# Patient Record
Sex: FEMALE | Race: WHITE | NOT HISPANIC OR LATINO | Employment: OTHER | URBAN - METROPOLITAN AREA
[De-identification: names, ages, dates, MRNs, and addresses within clinical notes are randomized per-mention and may not be internally consistent; named-entity substitution may affect disease eponyms.]

---

## 2017-01-26 ENCOUNTER — GENERIC CONVERSION - ENCOUNTER (OUTPATIENT)
Dept: OTHER | Facility: OTHER | Age: 77
End: 2017-01-26

## 2017-02-06 ENCOUNTER — GENERIC CONVERSION - ENCOUNTER (OUTPATIENT)
Dept: OTHER | Facility: OTHER | Age: 77
End: 2017-02-06

## 2017-02-24 ENCOUNTER — ALLSCRIPTS OFFICE VISIT (OUTPATIENT)
Dept: OTHER | Facility: OTHER | Age: 77
End: 2017-02-24

## 2017-03-10 ENCOUNTER — GENERIC CONVERSION - ENCOUNTER (OUTPATIENT)
Dept: OTHER | Facility: OTHER | Age: 77
End: 2017-03-10

## 2017-03-17 ENCOUNTER — GENERIC CONVERSION - ENCOUNTER (OUTPATIENT)
Dept: OTHER | Facility: OTHER | Age: 77
End: 2017-03-17

## 2017-04-07 ENCOUNTER — GENERIC CONVERSION - ENCOUNTER (OUTPATIENT)
Dept: OTHER | Facility: OTHER | Age: 77
End: 2017-04-07

## 2017-05-15 ENCOUNTER — GENERIC CONVERSION - ENCOUNTER (OUTPATIENT)
Dept: OTHER | Facility: OTHER | Age: 77
End: 2017-05-15

## 2017-06-30 ENCOUNTER — ALLSCRIPTS OFFICE VISIT (OUTPATIENT)
Dept: OTHER | Facility: OTHER | Age: 77
End: 2017-06-30

## 2017-09-12 ENCOUNTER — GENERIC CONVERSION - ENCOUNTER (OUTPATIENT)
Dept: OTHER | Facility: OTHER | Age: 77
End: 2017-09-12

## 2017-10-01 DIAGNOSIS — E11.9 TYPE 2 DIABETES MELLITUS WITHOUT COMPLICATIONS (HCC): ICD-10-CM

## 2017-10-01 DIAGNOSIS — Z00.00 ENCOUNTER FOR GENERAL ADULT MEDICAL EXAMINATION WITHOUT ABNORMAL FINDINGS: ICD-10-CM

## 2017-10-01 DIAGNOSIS — Z12.11 ENCOUNTER FOR SCREENING FOR MALIGNANT NEOPLASM OF COLON: ICD-10-CM

## 2017-10-01 DIAGNOSIS — I10 ESSENTIAL (PRIMARY) HYPERTENSION: ICD-10-CM

## 2017-10-01 DIAGNOSIS — E78.5 HYPERLIPIDEMIA: ICD-10-CM

## 2017-10-01 DIAGNOSIS — E55.9 VITAMIN D DEFICIENCY: ICD-10-CM

## 2017-10-31 ENCOUNTER — ALLSCRIPTS OFFICE VISIT (OUTPATIENT)
Dept: OTHER | Facility: OTHER | Age: 77
End: 2017-10-31

## 2017-11-01 NOTE — PROGRESS NOTES
Assessment  1  Hypertension (401 9) (I10)   2  Hyperlipemia (272 4) (E78 5)   3  Controlled diabetes mellitus (250 00) (E11 9)   4  Vitamin D deficiency (268 9) (E55 9)    Plan  Breast neoplasm, Health Maintenance, Hyperlipemia, Hypertension, Osteopenia,  Vitamin D deficiency    · (1) CBC/PLT/DIFF; Status:Active; Requested for:01Feb2018;    · (1) COMPREHENSIVE METABOLIC PANEL; Status:Active; Requested for:01Feb2018;    · (1) LIPID PANEL, FASTING; Status:Active; Requested for:01Feb2018; Controlled diabetes mellitus, Need for prophylactic vaccination and inoculation against  influenza    · Fluzone High-Dose 0 5 ML Intramuscular Suspension Prefilled Syringe  Health Maintenance, Hyperlipemia, Hypertension, Osteopenia, Vitamin D deficiency    · Continue with our present treatment plan ; Status:Complete;   Done: [id number]   · Follow-up visit in 4 Months Evaluation and Treatment  Follow-up  Status: Complete   Done: [id number]    Discussion/Summary    #1  Hypertension-stable at 130/66  Hyperlipidemia-patient has not gotten labs for this visit and will get them this week and be called with those results  Type 2 diabetes-controlled with last hemoglobin A1c 5 5%  Vitamin-D deficiency-stable  keep her blood pressure under control  4 months with labs  Patient will be called with labs that she was supposed to get for this visit  Possible side effects of new medications were reviewed with the patient/guardian today  The treatment plan was reviewed with the patient/guardian  The patient/guardian understands and agrees with the treatment plan     Self Referrals: No      Chief Complaint  Follow up hyperlipidemia,HTN, osteopenia, vitamin D deficiencyDose flu immunization administered today - [facility]      History of Present Illness  This is a 30-year-old female who comes in for her regular 4 months visit  She is feeling well with no complaints or problems   Her blood pressure today is 130/66 and her weight came down 4 lb from the previous visit to 171 lb  She feel to get her labs that were scheduled for this visit and will get them sometime this week and be called with those results  Review of Systems    Constitutional: No fever, no chills, feels well, no tiredness, no recent weight gain or weight loss  ENT: no complaints of earache, no loss of hearing, no nose bleeds, no nasal discharge, no sore throat, no hoarseness  Cardiovascular: No complaints of slow heart rate, no fast heart rate, no chest pain, no palpitations, no leg claudication, no lower extremity edema  Respiratory: No complaints of shortness of breath, no wheezing, no cough, no SOB on exertion, no orthopnea, no PND  Gastrointestinal: No complaints of abdominal pain, no constipation, no nausea or vomiting, no diarrhea, no bloody stools  Genitourinary: No complaints of dysuria, no incontinence, no pelvic pain, no dysmenorrhea, no vaginal discharge or bleeding  ROS reviewed  Active Problems  1  Allergic rhinitis (477 9) (J30 9)   2  Alopecia (704 00) (L65 9)   3  Breast neoplasm (239 3) (D49 3)   4  Bronchitis (490) (J40)   5  Colon cancer screening (V76 51) (Z12 11)   6  Controlled diabetes mellitus (250 00) (E11 9)   7  Hyperlipemia (272 4) (E78 5)   8  Hypertension (401 9) (I10)   9  Need for prophylactic vaccination and inoculation against influenza (V04 81) (Z23)   10  Osteopenia (733 90) (M85 80)   11  Screening for genitourinary condition (V81 6) (Z13 89)   12  Skin lesion (709 9) (L98 9)   13  Soft 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 2) (D49 2)   14  Synovial Cyst (727 40)   15  Urinary frequency (788 41) (R35 0)   16  Vitamin D deficiency (268 9) (E55 9)    Past Medical History  1  History of hemorrhoids (V13 89) (Z87 19)    The active problems and past medical history were reviewed and updated today  Surgical History  1  History of Bx Breast Percutan Needle Core Use Imag Guide (Stereotactic)   2  History of Hemorrhoidectomy   3   History of Total Abdominal Hysterectomy    The surgical history was reviewed and updated today  Family History  Mother    1  Family history of Family Health Status Of Mother -    2  Family history of Heart Disease (V17 49)   3  Family history of Lung Cancer (V16 1)   4  Family history of Uterine Cancer (V16 49)  Father    5  Family history of Family Health Status Of Father -    10  Family history of Heart Disease (V17 49)  Sister    7  Family history of malignant neoplasm of breast (V16 3) (Z80 3)  Maternal Grandmother    8  Family history of diabetes mellitus (V18 0) (Z83 3)    The family history was reviewed and updated today  Social History   · Being A Social Drinker   ·    · Never a smoker   · Retired  The social history was reviewed and updated today  The social history was reviewed and is unchanged  Current Meds   1  Biotin 87459 MCG Oral Tablet; Therapy: 33MWD4015 to Recorded   2  Clobetasol Propionate 0 05 % External Ointment; Therapy: 2015 to (Evaluate:73Axa8571) Recorded   3  HydroCHLOROthiazide 50 MG Oral Tablet; TAKE 1 TABLET DAILY; Therapy: 93DJW2252 to (Evaluate:2018)  Requested for: 38FFK1247; Last   Rx:2017 Ordered   4  Lumigan 0 01 % Ophthalmic Solution Recorded   5  MetFORMIN HCl - 500 MG Oral Tablet; TAKE 1 TABLET BY MOUTH EVERY DAY AS   DIRECTED; Therapy: 13ZHQ8711 to (Evaluate:73Aab0616)  Requested for: 43ZFT0212; Last   Rx:29Equ2208 Ordered   6  Vitamin D 2000 UNIT Oral Capsule; Take one capsule twice daily; Therapy: 16TZF1312 to Recorded    Allergies  1   Contrast Media Ready-Box MISC    Vitals  Vital Signs    Recorded: 23GIK5014 07:43AM   Heart Rate 80, L Radial   Pulse Quality Regular, L Radial   Systolic 055, LUE, Sitting   Diastolic 66, LUE, Sitting   BP CUFF SIZE Large   Height 5 ft 3 in   Weight 171 lb    BMI Calculated 30 29   BSA Calculated 1 81     Physical Exam    Constitutional   General appearance: No acute distress, well appearing and well nourished  Pulmonary   Auscultation of lungs: Clear to auscultation  Cardiovascular   Auscultation of heart: Normal rate and rhythm, normal S1 and S2, without murmurs  Examination of extremities for edema and/or varicosities: Normal     Psychiatric   Orientation to person, place, and time: Normal     Mood and affect: Normal          Health Management  Controlled diabetes mellitus   *VB - Eye Exam; every 1 year; Last 26Aug2016; Next Due: 75Igp5454; Overdue  Health Maintenance   *VB - Eye Exam; every 1 year; Last 26Aug2016; Next Due: 98Pmx3862;  Overdue    Future Appointments    Date/Time Provider Specialty Site   03/02/2018 07:30 AM Madisyn Burton, [facility]     Signatures   Electronically signed by : Hollis Stevens, [facility]; Oct 31 2017  8:22AM EST                       (Author)    Electronically signed by : Troy Chester MD; Oct 31 2017  9:34AM EST                       (Author)

## 2018-01-13 VITALS
WEIGHT: 172.63 LBS | DIASTOLIC BLOOD PRESSURE: 70 MMHG | HEIGHT: 63 IN | HEART RATE: 80 BPM | SYSTOLIC BLOOD PRESSURE: 130 MMHG | BODY MASS INDEX: 30.59 KG/M2

## 2018-01-14 VITALS
SYSTOLIC BLOOD PRESSURE: 154 MMHG | BODY MASS INDEX: 31.01 KG/M2 | HEART RATE: 72 BPM | WEIGHT: 175 LBS | HEIGHT: 63 IN | DIASTOLIC BLOOD PRESSURE: 74 MMHG

## 2018-01-14 VITALS
DIASTOLIC BLOOD PRESSURE: 66 MMHG | WEIGHT: 171 LBS | HEIGHT: 63 IN | HEART RATE: 80 BPM | BODY MASS INDEX: 30.3 KG/M2 | SYSTOLIC BLOOD PRESSURE: 130 MMHG

## 2018-02-01 DIAGNOSIS — D49.3 NEOPLASM OF BREAST: ICD-10-CM

## 2018-02-01 DIAGNOSIS — I10 ESSENTIAL (PRIMARY) HYPERTENSION: ICD-10-CM

## 2018-02-01 DIAGNOSIS — M85.80 OTHER SPECIFIED DISORDERS OF BONE DENSITY AND STRUCTURE, UNSPECIFIED SITE (CODE): ICD-10-CM

## 2018-02-01 DIAGNOSIS — Z00.00 ENCOUNTER FOR GENERAL ADULT MEDICAL EXAMINATION WITHOUT ABNORMAL FINDINGS: ICD-10-CM

## 2018-02-01 DIAGNOSIS — E55.9 VITAMIN D DEFICIENCY: ICD-10-CM

## 2018-02-01 DIAGNOSIS — E78.5 HYPERLIPIDEMIA: ICD-10-CM

## 2018-03-02 ENCOUNTER — OFFICE VISIT (OUTPATIENT)
Dept: FAMILY MEDICINE CLINIC | Facility: CLINIC | Age: 78
End: 2018-03-02
Payer: COMMERCIAL

## 2018-03-02 VITALS
HEIGHT: 63 IN | HEART RATE: 80 BPM | BODY MASS INDEX: 30.65 KG/M2 | DIASTOLIC BLOOD PRESSURE: 60 MMHG | WEIGHT: 173 LBS | SYSTOLIC BLOOD PRESSURE: 120 MMHG

## 2018-03-02 DIAGNOSIS — E78.2 MIXED HYPERLIPIDEMIA: ICD-10-CM

## 2018-03-02 DIAGNOSIS — E55.9 VITAMIN D DEFICIENCY: ICD-10-CM

## 2018-03-02 DIAGNOSIS — I10 ESSENTIAL HYPERTENSION: ICD-10-CM

## 2018-03-02 DIAGNOSIS — E11.9 CONTROLLED TYPE 2 DIABETES MELLITUS WITHOUT COMPLICATION, WITHOUT LONG-TERM CURRENT USE OF INSULIN (HCC): Primary | ICD-10-CM

## 2018-03-02 PROCEDURE — 1101F PT FALLS ASSESS-DOCD LE1/YR: CPT | Performed by: FAMILY MEDICINE

## 2018-03-02 PROCEDURE — 3725F SCREEN DEPRESSION PERFORMED: CPT | Performed by: FAMILY MEDICINE

## 2018-03-02 PROCEDURE — 1160F RVW MEDS BY RX/DR IN RCRD: CPT | Performed by: FAMILY MEDICINE

## 2018-03-02 PROCEDURE — 99214 OFFICE O/P EST MOD 30 MIN: CPT | Performed by: FAMILY MEDICINE

## 2018-03-02 RX ORDER — HYDROCHLOROTHIAZIDE 50 MG/1
1 TABLET ORAL DAILY
COMMUNITY
Start: 2014-11-20 | End: 2018-08-27 | Stop reason: SDUPTHER

## 2018-03-02 RX ORDER — MULTIVIT-MIN/IRON/FOLIC ACID/K 18-600-40
1 CAPSULE ORAL 2 TIMES DAILY
COMMUNITY
Start: 2015-06-05

## 2018-03-02 RX ORDER — GLUCOSAMINE/D3/BOSWELLIA SERRA 1500MG-400
TABLET ORAL
COMMUNITY
Start: 2015-06-05 | End: 2018-09-21 | Stop reason: ALTCHOICE

## 2018-03-02 RX ORDER — CLOBETASOL PROPIONATE 0.5 MG/G
OINTMENT TOPICAL
COMMUNITY
Start: 2015-04-29 | End: 2020-11-25 | Stop reason: SDUPTHER

## 2018-03-02 NOTE — PROGRESS NOTES
Assessment/Plan:    Controlled diabetes mellitus ([facility] [address] )  Patient's blood sugar is well controlled at 101 up from 90  She is currently on metformin 500 mg 1 daily  Hypertension  Her blood pressure is stable at 120/60  She takes hydrochlorothiazide 50 mg daily  Her potassium is within normal range  Hyperlipemia  Cholesterol numbers are slightly elevated with the total cholesterol 203 and the triglycerides 199  She did bring her LDL down from 132 to 113 and she is not on any medication to lower the cholesterol  Vitamin D deficiency  Vitamin-D level was ordered for her next visit  Diagnoses and all orders for this visit:    Controlled type 2 diabetes mellitus without complication, without long-term current use of insulin (HCC)  -     CBC and differential  -     Comprehensive metabolic panel  -     HEMOGLOBIN A1C W/ EAG ESTIMATION; Future  -     Lipid Panel with Direct LDL reflex; Future  -     Microalbumin / creatinine urine ratio; Future  -     TSH, 3rd generation; Future  -     Vitamin B12; Future    Essential hypertension  -     CBC and differential  -     Comprehensive metabolic panel  -     HEMOGLOBIN A1C W/ EAG ESTIMATION; Future  -     Lipid Panel with Direct LDL reflex; Future  -     Microalbumin / creatinine urine ratio; Future  -     TSH, 3rd generation; Future  -     Vitamin B12; Future    Mixed hyperlipidemia  -     CBC and differential  -     Comprehensive metabolic panel  -     HEMOGLOBIN A1C W/ EAG ESTIMATION; Future  -     Lipid Panel with Direct LDL reflex; Future  -     Microalbumin / creatinine urine ratio; Future  -     TSH, 3rd generation; Future  -     Vitamin B12; Future    Vitamin D deficiency  -     CBC and differential  -     Comprehensive metabolic panel  -     HEMOGLOBIN A1C W/ EAG ESTIMATION; Future  -     Lipid Panel with Direct LDL reflex; Future  -     Microalbumin / creatinine urine ratio; Future  -     TSH, 3rd generation;  Future  -     Vitamin B12; Future  - Vitamin D 25 hydroxy; Future    Other orders  -     Biotin 60173 MCG TABS; Take by mouth  -     clobetasol (TEMOVATE) 0 05 % ointment; Apply topically  -     hydrochlorothiazide (HYDRODIURIL) 50 mg tablet; Take 1 tablet by mouth daily  -     bimatoprost (LUMIGAN) 0 01 % ophthalmic drops; Apply to eye  -     metFORMIN (GLUCOPHAGE) 500 mg tablet; Take 1 tablet by mouth daily  -     Cholecalciferol (VITAMIN D) 2000 units CAPS; Take 1 capsule by mouth 2 (two) times a day          Subjective:      Patient ID: Joslyn Wiggins is a 69 y o  female  CC:  Follow up DM 2, hyperlipidemia, HTN, Vitamin D deficiency  Review BW results that were done at [facility]    HPI:  This is a 60-year-old female who comes in accompanied by her  for her regular 4 months visit  She is feeling well with no complaints or problems  Her blood pressure is 120/60 and her weight is down 1 lb from the previous visit to 173 lb  Reviewing her labs her CBC is normal, glucose is 101 up from 90, total cholesterol 203, triglycerides 199, HDL 50 and LDL came  Down from 132 to 113  Because of being on metformin she would like of vitamin B12 level drawn on her in her labs next time  The following portions of the patient's history were reviewed and updated as appropriate: allergies, current medications, past family history, past medical history, past social history, past surgical history and problem list     Review of Systems   Constitutional: Negative  HENT: Negative  Eyes: Negative  Respiratory: Negative  Cardiovascular: Negative  Gastrointestinal: Negative  Endocrine: Negative  Genitourinary: Negative  Musculoskeletal: Negative  Skin: Negative  Allergic/Immunologic: Negative  Neurological: Negative  Hematological: Negative  Psychiatric/Behavioral: Negative            Objective:      /60 (BP Location: Left arm, Patient Position: Sitting, Cuff Size: Large)   Pulse 80   Ht 5' 3" (1 6 m)   Wt 78 5 kg (173 lb)   BMI 30 65 kg/m²          Physical Exam   Constitutional: She is oriented to person, place, and time  She appears well-developed and well-nourished  HENT:   Head: Normocephalic  Right Ear: External ear normal    Left Ear: External ear normal    Mouth/Throat: Oropharynx is clear and moist    Eyes: Conjunctivae and EOM are normal  Pupils are equal, round, and reactive to light  Neck: Normal range of motion  Neck supple  Cardiovascular: Normal rate, regular rhythm and normal heart sounds  Pulmonary/Chest: Effort normal and breath sounds normal    Abdominal: Soft  Bowel sounds are normal    Musculoskeletal: Normal range of motion  Neurological: She is alert and oriented to person, place, and time  Skin: Skin is warm  Psychiatric: She has a normal mood and affect  Her behavior is normal  Judgment and thought content normal    Nursing note and vitals reviewed

## 2018-03-02 NOTE — ASSESSMENT & PLAN NOTE
Patient's blood sugar is well controlled at 101 up from 90  She is currently on metformin 500 mg 1 daily

## 2018-03-02 NOTE — ASSESSMENT & PLAN NOTE
Cholesterol numbers are slightly elevated with the total cholesterol 203 and the triglycerides 199  She did bring her LDL down from 132 to 113 and she is not on any medication to lower the cholesterol

## 2018-03-02 NOTE — ASSESSMENT & PLAN NOTE
Her blood pressure is stable at 120/60  She takes hydrochlorothiazide 50 mg daily  Her potassium is within normal range

## 2018-05-18 ENCOUNTER — LAB REQUISITION (OUTPATIENT)
Dept: LAB | Facility: HOSPITAL | Age: 78
End: 2018-05-18
Payer: COMMERCIAL

## 2018-05-18 DIAGNOSIS — Z11.51 ENCOUNTER FOR SCREENING FOR HUMAN PAPILLOMAVIRUS (HPV): ICD-10-CM

## 2018-05-18 DIAGNOSIS — Z12.72 ENCOUNTER FOR SCREENING FOR MALIGNANT NEOPLASM OF VAGINA: ICD-10-CM

## 2018-05-18 DIAGNOSIS — Z01.419 ENCOUNTER FOR GYNECOLOGICAL EXAMINATION WITHOUT ABNORMAL FINDING: ICD-10-CM

## 2018-05-18 PROCEDURE — 87624 HPV HI-RISK TYP POOLED RSLT: CPT | Performed by: OBSTETRICS & GYNECOLOGY

## 2018-05-18 PROCEDURE — 88175 CYTOPATH C/V AUTO FLUID REDO: CPT | Performed by: OBSTETRICS & GYNECOLOGY

## 2018-05-22 LAB — HPV RRNA GENITAL QL NAA+PROBE: NORMAL

## 2018-05-23 LAB
LAB AP GYN PRIMARY INTERPRETATION: NORMAL
Lab: NORMAL

## 2018-07-22 DIAGNOSIS — E11.9 TYPE 2 DIABETES MELLITUS WITHOUT COMPLICATION, WITHOUT LONG-TERM CURRENT USE OF INSULIN (HCC): Primary | ICD-10-CM

## 2018-08-27 DIAGNOSIS — I10 ESSENTIAL HYPERTENSION: Primary | ICD-10-CM

## 2018-08-27 RX ORDER — HYDROCHLOROTHIAZIDE 50 MG/1
TABLET ORAL
Qty: 90 TABLET | Refills: 1 | Status: SHIPPED | OUTPATIENT
Start: 2018-08-27 | End: 2018-11-02 | Stop reason: SDUPTHER

## 2018-09-04 LAB
CREAT ?TM UR-SCNC: 37.6 UMOL/L
HBA1C MFR BLD HPLC: 5.6 %
MICROALBUMIN UR-MCNC: 0.9 MG/L (ref 0–20)
MICROALBUMIN/CREAT UR: 0 MG/G{CREAT}

## 2018-09-21 ENCOUNTER — OFFICE VISIT (OUTPATIENT)
Dept: FAMILY MEDICINE CLINIC | Facility: CLINIC | Age: 78
End: 2018-09-21
Payer: COMMERCIAL

## 2018-09-21 VITALS
WEIGHT: 173 LBS | HEART RATE: 70 BPM | DIASTOLIC BLOOD PRESSURE: 60 MMHG | BODY MASS INDEX: 30.65 KG/M2 | SYSTOLIC BLOOD PRESSURE: 124 MMHG | HEIGHT: 63 IN

## 2018-09-21 DIAGNOSIS — E55.9 VITAMIN D DEFICIENCY: ICD-10-CM

## 2018-09-21 DIAGNOSIS — I10 ESSENTIAL HYPERTENSION: ICD-10-CM

## 2018-09-21 DIAGNOSIS — E11.9 CONTROLLED TYPE 2 DIABETES MELLITUS WITHOUT COMPLICATION, WITHOUT LONG-TERM CURRENT USE OF INSULIN (HCC): ICD-10-CM

## 2018-09-21 DIAGNOSIS — E78.2 MIXED HYPERLIPIDEMIA: ICD-10-CM

## 2018-09-21 DIAGNOSIS — R07.89 CHEST TIGHTNESS OR PRESSURE: Primary | ICD-10-CM

## 2018-09-21 DIAGNOSIS — Z23 NEED FOR INFLUENZA VACCINATION: ICD-10-CM

## 2018-09-21 PROCEDURE — G0008 ADMIN INFLUENZA VIRUS VAC: HCPCS

## 2018-09-21 PROCEDURE — 93000 ELECTROCARDIOGRAM COMPLETE: CPT | Performed by: FAMILY MEDICINE

## 2018-09-21 PROCEDURE — 99214 OFFICE O/P EST MOD 30 MIN: CPT | Performed by: FAMILY MEDICINE

## 2018-09-21 PROCEDURE — 3008F BODY MASS INDEX DOCD: CPT | Performed by: FAMILY MEDICINE

## 2018-09-21 PROCEDURE — 3074F SYST BP LT 130 MM HG: CPT | Performed by: FAMILY MEDICINE

## 2018-09-21 PROCEDURE — 90662 IIV NO PRSV INCREASED AG IM: CPT

## 2018-09-21 PROCEDURE — 3078F DIAST BP <80 MM HG: CPT | Performed by: FAMILY MEDICINE

## 2018-09-21 NOTE — PROGRESS NOTES
Assessment/Plan:    Controlled diabetes mellitus ([facility] [address] )  Lab Results   Component Value Date    HGBA1C 5 6 09/04/2018       No results for input(s): POCGLU in the last 72 hours  Blood Sugar Average: Last 72 hrs:    Her glucose went up from 101 to 106 and her hemoglobin A1c was 5 6  She is on metformin 500 mg 1 daily      Hyperlipemia  Her cholesterol numbers are stable but her LDL is slightly elevated at 113  She is not on any cholesterol-lowering medication at this time  A repeat LDL will be done again in 6 months  Hypertension  Her blood pressure is stable at 120 4/60  She is on hydrochlorothiazide 50 mg daily  Diagnoses and all orders for this visit:    Chest tightness or pressure  -     Echo complete with contrast if indicated; Future  -     Comprehensive metabolic panel; Future  -     LDL cholesterol, direct; Future    Controlled type 2 diabetes mellitus without complication, without long-term current use of insulin (HCC)  -     Comprehensive metabolic panel; Future  -     LDL cholesterol, direct; Future    Mixed hyperlipidemia  -     Comprehensive metabolic panel; Future  -     LDL cholesterol, direct; Future    Essential hypertension  -     Comprehensive metabolic panel; Future  -     LDL cholesterol, direct; Future    Vitamin D deficiency  -     Comprehensive metabolic panel; Future  -     LDL cholesterol, direct; Future          Subjective: Follow up hyperlipidemia, HTN, osteopenia, vitamin D deficiency, DM2  Review BW results  High Dose flu immunization administered today  Pt had diabetic eye exam 10 Sept 2018   -  [facility]     Patient ID: Gael Aden is a 69 y o  female  This is a 67-year-old female who comes in accompanied by her  for her regular 6 month visit  She has had 2 episodes of pressure on her chest while she was doing the dishes and had no actual chest pain or shortness of breath  She had no radiation of the chest pain down the arm or to the jaw    The episode lasted for several seconds and was fleeting  Her blood pressure today is 124/60 and her weight remained the same at 173 lb  Her EKG which was done in the office today was read by Dr Saulo Hoyt as no acute changes  She will be set up for a stress echo in the near future  Reviewing her labs, her CBC was normal, glucose went up from 101 to 106, B12 level was normal vitamin-D level was 46 and her TSH was within normal limits  Her cholesterol numbers were stable and the LDL remained the same at 113  The following portions of the patient's history were reviewed and updated as appropriate: allergies, current medications, past family history, past medical history, past social history, past surgical history and problem list     Review of Systems   Constitutional: Negative  HENT: Negative  Eyes: Negative  Respiratory: Negative  Cardiovascular: Negative  Gastrointestinal: Negative  Endocrine: Negative  Genitourinary: Negative  Musculoskeletal: Negative  Skin: Negative  Allergic/Immunologic: Negative  Neurological: Negative  Hematological: Negative  Psychiatric/Behavioral: Negative  Objective:      /60 (BP Location: Left arm, Patient Position: Sitting, Cuff Size: Large)   Pulse 70   Ht 5' 3" (1 6 m)   Wt 78 5 kg (173 lb)   BMI 30 65 kg/m²          Physical Exam   Constitutional: She is oriented to person, place, and time  She appears well-developed and well-nourished  HENT:   Head: Normocephalic  Right Ear: External ear normal    Left Ear: External ear normal    Mouth/Throat: Oropharynx is clear and moist    Eyes: Conjunctivae and EOM are normal  Pupils are equal, round, and reactive to light  Neck: Normal range of motion  Neck supple  Cardiovascular: Normal rate, regular rhythm and normal heart sounds  Pulmonary/Chest: Effort normal and breath sounds normal    Abdominal: Soft  Bowel sounds are normal    Musculoskeletal: Normal range of motion  Neurological: She is alert and oriented to person, place, and time  Skin: Skin is warm  Psychiatric: She has a normal mood and affect  Her behavior is normal  Judgment and thought content normal    Nursing note and vitals reviewed

## 2018-09-21 NOTE — ASSESSMENT & PLAN NOTE
Her cholesterol numbers are stable but her LDL is slightly elevated at 113  She is not on any cholesterol-lowering medication at this time  A repeat LDL will be done again in 6 months

## 2018-09-21 NOTE — ASSESSMENT & PLAN NOTE
Lab Results   Component Value Date    HGBA1C 5 6 09/04/2018       No results for input(s): POCGLU in the last 72 hours  Blood Sugar Average: Last 72 hrs:    Her glucose went up from 101 to 106 and her hemoglobin A1c was 5 6    She is on metformin 500 mg 1 daily

## 2018-09-24 LAB
LEFT EYE DIABETIC RETINOPATHY: NORMAL
RIGHT EYE DIABETIC RETINOPATHY: NORMAL

## 2018-10-16 PROBLEM — R53.82 CHRONIC FATIGUE: Status: ACTIVE | Noted: 2018-10-16

## 2018-10-30 ENCOUNTER — HOSPITAL ENCOUNTER (OUTPATIENT)
Dept: NON INVASIVE DIAGNOSTICS | Facility: CLINIC | Age: 78
Discharge: HOME | End: 2018-10-30
Payer: COMMERCIAL

## 2018-10-30 DIAGNOSIS — R07.89 CHEST TIGHTNESS OR PRESSURE: ICD-10-CM

## 2018-10-30 PROCEDURE — 93306 TTE W/DOPPLER COMPLETE: CPT

## 2018-10-30 PROCEDURE — 93306 TTE W/DOPPLER COMPLETE: CPT | Performed by: INTERNAL MEDICINE

## 2018-11-02 ENCOUNTER — OFFICE VISIT (OUTPATIENT)
Dept: FAMILY MEDICINE CLINIC | Facility: CLINIC | Age: 78
End: 2018-11-02
Payer: COMMERCIAL

## 2018-11-02 VITALS
BODY MASS INDEX: 31.54 KG/M2 | RESPIRATION RATE: 16 BRPM | HEART RATE: 80 BPM | WEIGHT: 178 LBS | SYSTOLIC BLOOD PRESSURE: 120 MMHG | TEMPERATURE: 98 F | HEIGHT: 63 IN | DIASTOLIC BLOOD PRESSURE: 64 MMHG

## 2018-11-02 DIAGNOSIS — Z01.818 PREOP TESTING: Primary | ICD-10-CM

## 2018-11-02 DIAGNOSIS — I10 ESSENTIAL HYPERTENSION: ICD-10-CM

## 2018-11-02 DIAGNOSIS — Z01.818 PREOP GENERAL PHYSICAL EXAM: ICD-10-CM

## 2018-11-02 PROCEDURE — 1036F TOBACCO NON-USER: CPT | Performed by: FAMILY MEDICINE

## 2018-11-02 PROCEDURE — 3008F BODY MASS INDEX DOCD: CPT | Performed by: FAMILY MEDICINE

## 2018-11-02 PROCEDURE — 4040F PNEUMOC VAC/ADMIN/RCVD: CPT | Performed by: FAMILY MEDICINE

## 2018-11-02 PROCEDURE — 3074F SYST BP LT 130 MM HG: CPT | Performed by: FAMILY MEDICINE

## 2018-11-02 PROCEDURE — 99214 OFFICE O/P EST MOD 30 MIN: CPT | Performed by: FAMILY MEDICINE

## 2018-11-02 PROCEDURE — 3078F DIAST BP <80 MM HG: CPT | Performed by: FAMILY MEDICINE

## 2018-11-02 RX ORDER — PREDNISOLONE ACETATE 10 MG/ML
SUSPENSION/ DROPS OPHTHALMIC
Refills: 1 | COMMUNITY
Start: 2018-10-18 | End: 2019-04-05 | Stop reason: ALTCHOICE

## 2018-11-02 RX ORDER — HYDROCHLOROTHIAZIDE 50 MG/1
50 TABLET ORAL DAILY
Qty: 90 TABLET | Refills: 3 | Status: SHIPPED | OUTPATIENT
Start: 2018-11-02 | End: 2019-11-10 | Stop reason: SDUPTHER

## 2018-11-02 RX ORDER — KETOROLAC TROMETHAMINE 5 MG/ML
SOLUTION OPHTHALMIC
Refills: 1 | COMMUNITY
Start: 2018-10-18 | End: 2019-04-05 | Stop reason: ALTCHOICE

## 2018-11-02 RX ORDER — POLYMYXIN B SULFATE AND TRIMETHOPRIM 1; 10000 MG/ML; [USP'U]/ML
SOLUTION OPHTHALMIC
Refills: 1 | COMMUNITY
Start: 2018-10-18 | End: 2019-04-05 | Stop reason: ALTCHOICE

## 2018-11-02 RX ORDER — TRAVOPROST 0.004 %
DROPS OPHTHALMIC (EYE)
COMMUNITY
Start: 2018-10-22 | End: 2019-04-05 | Stop reason: ALTCHOICE

## 2018-11-02 NOTE — PROGRESS NOTES
Assessment/Plan:    Hypertension  Her blood pressure is stable at 1 20/64 and she is on hydrochlorothiazide 50 mg 1 daily  Preop general physical exam  Her physical exam is within normal limits  Preop testing  Dr Nevaeh fry ordered a BMP and I ordered a CBC, PT and PTT which will be done today  Addendum:  CBC, PT, PTT was reviewed and was within normal limits  Therefore the patient is cleared for surgery  Diagnoses and all orders for this visit:    Preop testing  -     hydrochlorothiazide (HYDRODIURIL) 50 mg tablet; Take 1 tablet (50 mg total) by mouth daily  -     APTT; Future  -     CBC and differential; Future  -     Protime-INR; Future    Essential hypertension  -     hydrochlorothiazide (HYDRODIURIL) 50 mg tablet; Take 1 tablet (50 mg total) by mouth daily    Preop general physical exam  -     hydrochlorothiazide (HYDRODIURIL) 50 mg tablet; Take 1 tablet (50 mg total) by mouth daily  -     APTT; Future  -     CBC and differential; Future  -     Protime-INR; Future    Other orders  -     ketorolac (ACULAR) 0 5 % ophthalmic solution; INSTILL 1 DROP INTO RIGHT EYE 4X DAY X 1WK, 3X X 1 WK, 2X DAY X 1WK, 1X DAY X 1WK  -     polymyxin b-trimethoprim (POLYTRIM) ophthalmic solution; INSTILL 1 DROP INTO SURGERY EYE 4 TIMES A DAY, START 3 DAYS PRIOR TO SURGERY/SEPARATE BY 5 MIN OF EA  -     prednisoLONE acetate (PRED FORTE) 1 % ophthalmic suspension; INSTILL 1 DROP INTO SURGERY EYE 4 TIMES A DAY, START 3 DAYS PRIOR TO SURGERY, SEPARATE DROPS BY 5MIN  -     TRAVATAN Z 0 004 % ophthalmic solution;           Subjective:   Pre op for cataract surgery left eye 12 Nov and right eye 26 Nov   Dr Nevaeh Carty  Pt had echocardiogram and will be getting BW today  She has questions about starting a new medication - Rogaine for women  -  [facility]     Patient ID: Lelo Ramirez is a 69 y o  female  This is a 56-year-old female who comes in accompanied by her  for a preop physical exam to remove her cataracts    Her left cataract is going to be removed on November 12th and her right cataract is going to be removed on November 26th  The surgery will be done by Dr Dwight Alves  Her blood pressure is stable at 1 20/64 and her weight is 178 lb  The cataracts appeared approximately 1-1 and half years ago and have gotten progressively worse especially noticed with trying to read distance objects  She had an echocardiogram which was basically within normal range  The tightness she was having in her chest has totally disappeared and only lasted for a day or 2  The following portions of the patient's history were reviewed and updated as appropriate: allergies, current medications, past family history, past medical history, past social history, past surgical history and problem list     Review of Systems   Constitutional: Negative  HENT: Negative  Eyes: Positive for visual disturbance  Bilateral cataracts   Respiratory: Negative  Cardiovascular: Negative  Gastrointestinal: Negative  Endocrine: Negative  Genitourinary: Negative  Musculoskeletal: Negative  Skin: Negative  Allergic/Immunologic: Negative  Neurological: Negative  Hematological: Negative  Psychiatric/Behavioral: Negative  Objective:      /64 (BP Location: Left arm, Patient Position: Sitting, Cuff Size: Large)   Pulse 80   Temp 98 °F (36 7 °C) (Oral)   Resp 16   Ht 5' 3" (1 6 m)   Wt 80 7 kg (178 lb)   BMI 31 53 kg/m²          Physical Exam   Constitutional: She is oriented to person, place, and time  She appears well-developed and well-nourished  HENT:   Head: Normocephalic  Right Ear: External ear normal    Left Ear: External ear normal    Mouth/Throat: Oropharynx is clear and moist    Eyes: Pupils are equal, round, and reactive to light  Conjunctivae and EOM are normal    Neck: Normal range of motion  Neck supple  Cardiovascular: Normal rate, regular rhythm and normal heart sounds  Pulmonary/Chest: Effort normal and breath sounds normal    Abdominal: Soft  Bowel sounds are normal    Musculoskeletal: Normal range of motion  Neurological: She is alert and oriented to person, place, and time  Skin: Skin is warm  Psychiatric: She has a normal mood and affect  Her behavior is normal  Judgment and thought content normal    Nursing note and vitals reviewed

## 2018-11-02 NOTE — ASSESSMENT & PLAN NOTE
Dr Ashish fry ordered a BMP and I ordered a CBC, PT and PTT which will be done today  Addendum:  CBC, PT, PTT was reviewed and was within normal limits  Therefore the patient is cleared for surgery

## 2019-03-21 ENCOUNTER — LAB (OUTPATIENT)
Dept: LAB | Facility: CLINIC | Age: 79
End: 2019-03-21
Payer: COMMERCIAL

## 2019-03-21 DIAGNOSIS — R07.89 CHEST TIGHTNESS OR PRESSURE: ICD-10-CM

## 2019-03-21 DIAGNOSIS — E55.9 VITAMIN D DEFICIENCY: ICD-10-CM

## 2019-03-21 DIAGNOSIS — E11.9 CONTROLLED TYPE 2 DIABETES MELLITUS WITHOUT COMPLICATION, WITHOUT LONG-TERM CURRENT USE OF INSULIN (HCC): ICD-10-CM

## 2019-03-21 DIAGNOSIS — Z01.818 PREOP TESTING: ICD-10-CM

## 2019-03-21 DIAGNOSIS — I10 ESSENTIAL HYPERTENSION: ICD-10-CM

## 2019-03-21 DIAGNOSIS — Z01.818 PREOP GENERAL PHYSICAL EXAM: ICD-10-CM

## 2019-03-21 DIAGNOSIS — E78.2 MIXED HYPERLIPIDEMIA: ICD-10-CM

## 2019-03-21 LAB
ALBUMIN SERPL BCP-MCNC: 4 G/DL (ref 3.5–5)
ALP SERPL-CCNC: 45 U/L (ref 46–116)
ALT SERPL W P-5'-P-CCNC: 21 U/L (ref 12–78)
ANION GAP SERPL CALCULATED.3IONS-SCNC: 6 MMOL/L (ref 4–13)
AST SERPL W P-5'-P-CCNC: 12 U/L (ref 5–45)
BILIRUB SERPL-MCNC: 0.71 MG/DL (ref 0.2–1)
BUN SERPL-MCNC: 13 MG/DL (ref 5–25)
CALCIUM SERPL-MCNC: 9.5 MG/DL (ref 8.3–10.1)
CHLORIDE SERPL-SCNC: 97 MMOL/L (ref 100–108)
CO2 SERPL-SCNC: 31 MMOL/L (ref 21–32)
CREAT SERPL-MCNC: 0.74 MG/DL (ref 0.6–1.3)
GFR SERPL CREATININE-BSD FRML MDRD: 78 ML/MIN/1.73SQ M
GLUCOSE P FAST SERPL-MCNC: 108 MG/DL (ref 65–99)
LDLC SERPL DIRECT ASSAY-MCNC: 113 MG/DL (ref 0–100)
POTASSIUM SERPL-SCNC: 2.9 MMOL/L (ref 3.5–5.3)
PROT SERPL-MCNC: 7.2 G/DL (ref 6.4–8.2)
SODIUM SERPL-SCNC: 134 MMOL/L (ref 136–145)

## 2019-03-21 PROCEDURE — 83721 ASSAY OF BLOOD LIPOPROTEIN: CPT

## 2019-03-21 PROCEDURE — 36415 COLL VENOUS BLD VENIPUNCTURE: CPT

## 2019-03-21 PROCEDURE — 80053 COMPREHEN METABOLIC PANEL: CPT

## 2019-04-05 ENCOUNTER — OFFICE VISIT (OUTPATIENT)
Dept: FAMILY MEDICINE CLINIC | Facility: CLINIC | Age: 79
End: 2019-04-05
Payer: COMMERCIAL

## 2019-04-05 VITALS
WEIGHT: 172 LBS | DIASTOLIC BLOOD PRESSURE: 70 MMHG | SYSTOLIC BLOOD PRESSURE: 140 MMHG | HEART RATE: 84 BPM | HEIGHT: 63 IN | BODY MASS INDEX: 30.48 KG/M2

## 2019-04-05 DIAGNOSIS — R53.82 CHRONIC FATIGUE: ICD-10-CM

## 2019-04-05 DIAGNOSIS — Z23 NEED FOR VACCINATION AGAINST STREPTOCOCCUS PNEUMONIAE USING PNEUMOCOCCAL CONJUGATE VACCINE 13: ICD-10-CM

## 2019-04-05 DIAGNOSIS — E55.9 VITAMIN D DEFICIENCY: ICD-10-CM

## 2019-04-05 DIAGNOSIS — E78.2 MIXED HYPERLIPIDEMIA: Primary | ICD-10-CM

## 2019-04-05 DIAGNOSIS — E66.9 OBESITY (BMI 30.0-34.9): ICD-10-CM

## 2019-04-05 DIAGNOSIS — I10 ESSENTIAL HYPERTENSION: ICD-10-CM

## 2019-04-05 DIAGNOSIS — M85.80 OSTEOPENIA, UNSPECIFIED LOCATION: ICD-10-CM

## 2019-04-05 DIAGNOSIS — E11.9 CONTROLLED TYPE 2 DIABETES MELLITUS WITHOUT COMPLICATION, WITHOUT LONG-TERM CURRENT USE OF INSULIN (HCC): ICD-10-CM

## 2019-04-05 DIAGNOSIS — E87.6 HYPOKALEMIA: ICD-10-CM

## 2019-04-05 PROCEDURE — 1160F RVW MEDS BY RX/DR IN RCRD: CPT

## 2019-04-05 PROCEDURE — 1101F PT FALLS ASSESS-DOCD LE1/YR: CPT | Performed by: FAMILY MEDICINE

## 2019-04-05 PROCEDURE — G0439 PPPS, SUBSEQ VISIT: HCPCS | Performed by: FAMILY MEDICINE

## 2019-04-05 PROCEDURE — 1170F FXNL STATUS ASSESSED: CPT | Performed by: FAMILY MEDICINE

## 2019-04-05 PROCEDURE — 3725F SCREEN DEPRESSION PERFORMED: CPT | Performed by: FAMILY MEDICINE

## 2019-04-05 PROCEDURE — 90670 PCV13 VACCINE IM: CPT

## 2019-04-05 PROCEDURE — 1036F TOBACCO NON-USER: CPT | Performed by: FAMILY MEDICINE

## 2019-04-05 PROCEDURE — G0009 ADMIN PNEUMOCOCCAL VACCINE: HCPCS

## 2019-04-05 PROCEDURE — 99214 OFFICE O/P EST MOD 30 MIN: CPT | Performed by: FAMILY MEDICINE

## 2019-04-05 PROCEDURE — 4040F PNEUMOC VAC/ADMIN/RCVD: CPT

## 2019-04-05 PROCEDURE — 1125F AMNT PAIN NOTED PAIN PRSNT: CPT | Performed by: FAMILY MEDICINE

## 2019-04-05 RX ORDER — PRAVASTATIN SODIUM 10 MG
10 TABLET ORAL
Qty: 90 TABLET | Refills: 3 | Status: SHIPPED | OUTPATIENT
Start: 2019-04-05 | End: 2020-03-16

## 2019-04-05 RX ORDER — BIOTIN 10 MG
TABLET ORAL
COMMUNITY
End: 2019-05-20 | Stop reason: ALTCHOICE

## 2019-04-08 ENCOUNTER — APPOINTMENT (OUTPATIENT)
Dept: LAB | Facility: CLINIC | Age: 79
End: 2019-04-08
Payer: COMMERCIAL

## 2019-04-08 DIAGNOSIS — E87.6 HYPOKALEMIA: ICD-10-CM

## 2019-04-08 LAB — POTASSIUM SERPL-SCNC: 3.9 MMOL/L (ref 3.5–5.3)

## 2019-04-08 PROCEDURE — 84132 ASSAY OF SERUM POTASSIUM: CPT

## 2019-04-08 PROCEDURE — 36415 COLL VENOUS BLD VENIPUNCTURE: CPT

## 2019-04-22 LAB
LEFT EYE DIABETIC RETINOPATHY: NORMAL
RIGHT EYE DIABETIC RETINOPATHY: NORMAL

## 2019-05-20 ENCOUNTER — ANNUAL EXAM (OUTPATIENT)
Dept: GYNECOLOGY | Facility: CLINIC | Age: 79
End: 2019-05-20
Payer: COMMERCIAL

## 2019-05-20 VITALS
SYSTOLIC BLOOD PRESSURE: 120 MMHG | BODY MASS INDEX: 30.66 KG/M2 | DIASTOLIC BLOOD PRESSURE: 72 MMHG | HEART RATE: 91 BPM | TEMPERATURE: 97.1 F | WEIGHT: 173.02 LBS | HEIGHT: 63 IN | RESPIRATION RATE: 18 BRPM

## 2019-05-20 DIAGNOSIS — Z80.3 FAMILY HISTORY OF BREAST CANCER: ICD-10-CM

## 2019-05-20 DIAGNOSIS — Z92.89 HISTORY OF MEDICAL TREATMENT: Primary | ICD-10-CM

## 2019-05-20 DIAGNOSIS — Z12.39 BREAST CANCER SCREENING: ICD-10-CM

## 2019-05-20 DIAGNOSIS — Z80.41 FAMILY HISTORY OF OVARIAN CANCER: ICD-10-CM

## 2019-05-20 DIAGNOSIS — Z85.43 PERSONAL HISTORY OF MALIGNANT NEOPLASM OF OVARY: ICD-10-CM

## 2019-05-20 PROCEDURE — G0101 CA SCREEN;PELVIC/BREAST EXAM: HCPCS | Performed by: OBSTETRICS & GYNECOLOGY

## 2019-06-30 DIAGNOSIS — E11.9 TYPE 2 DIABETES MELLITUS WITHOUT COMPLICATION, WITHOUT LONG-TERM CURRENT USE OF INSULIN (HCC): ICD-10-CM

## 2019-10-07 LAB
CREAT ?TM UR-SCNC: 59 UMOL/L
EXT MICROALBUMIN URINE RANDOM: 0.9
HBA1C MFR BLD HPLC: 5.8 %

## 2019-11-05 ENCOUNTER — OFFICE VISIT (OUTPATIENT)
Dept: FAMILY MEDICINE CLINIC | Facility: CLINIC | Age: 79
End: 2019-11-05
Payer: COMMERCIAL

## 2019-11-05 VITALS
WEIGHT: 173.6 LBS | HEART RATE: 72 BPM | HEIGHT: 63 IN | DIASTOLIC BLOOD PRESSURE: 60 MMHG | BODY MASS INDEX: 30.76 KG/M2 | SYSTOLIC BLOOD PRESSURE: 120 MMHG

## 2019-11-05 DIAGNOSIS — E78.2 MIXED HYPERLIPIDEMIA: ICD-10-CM

## 2019-11-05 DIAGNOSIS — M17.11 PRIMARY OSTEOARTHRITIS OF RIGHT KNEE: ICD-10-CM

## 2019-11-05 DIAGNOSIS — I10 ESSENTIAL HYPERTENSION: ICD-10-CM

## 2019-11-05 DIAGNOSIS — E55.9 VITAMIN D DEFICIENCY: ICD-10-CM

## 2019-11-05 DIAGNOSIS — Z23 ENCOUNTER FOR IMMUNIZATION: Primary | ICD-10-CM

## 2019-11-05 DIAGNOSIS — E11.9 CONTROLLED TYPE 2 DIABETES MELLITUS WITHOUT COMPLICATION, WITHOUT LONG-TERM CURRENT USE OF INSULIN (HCC): ICD-10-CM

## 2019-11-05 PROBLEM — R73.9 HYPERGLYCEMIA: Status: ACTIVE | Noted: 2019-11-05

## 2019-11-05 PROCEDURE — 3074F SYST BP LT 130 MM HG: CPT | Performed by: FAMILY MEDICINE

## 2019-11-05 PROCEDURE — 3078F DIAST BP <80 MM HG: CPT | Performed by: FAMILY MEDICINE

## 2019-11-05 PROCEDURE — 99214 OFFICE O/P EST MOD 30 MIN: CPT | Performed by: FAMILY MEDICINE

## 2019-11-05 PROCEDURE — G0008 ADMIN INFLUENZA VIRUS VAC: HCPCS | Performed by: FAMILY MEDICINE

## 2019-11-05 PROCEDURE — 90662 IIV NO PRSV INCREASED AG IM: CPT | Performed by: FAMILY MEDICINE

## 2019-11-05 NOTE — ASSESSMENT & PLAN NOTE
Her cholesterol numbers are improved with the total cholesterol coming down from 194 to 153, triglycerides coming down from 158 to 42, HDL 55, and LDL coming down from 113 to 70  She is on Pravachol 10 mg daily

## 2019-11-05 NOTE — ASSESSMENT & PLAN NOTE
Her fasting sugar was 104 and her hemoglobin A1c was 5 8% up from 5 6%  She is currently on metformin 500 mg daily    Lab Results   Component Value Date    HGBA1C 5 8 10/07/2019

## 2019-11-05 NOTE — PROGRESS NOTES
Assessment and Plan:  Follow-up 6 months with labs  Problem List Items Addressed This Visit        Endocrine    Controlled type 2 diabetes mellitus without complication, without long-term current use of insulin ([facility])     Her fasting sugar was 104 and her hemoglobin A1c was 5 8% up from 5 6%  She is currently on metformin 500 mg daily  Lab Results   Component Value Date    HGBA1C 5 8 10/07/2019            Relevant Orders    CBC and differential    Comprehensive metabolic panel    Hemoglobin A1C    LDL cholesterol, direct       Cardiovascular and Mediastinum    Essential hypertension     Blood pressure is stable at 1 20/60 and she is on hydrochlorothiazide 50 mg daily  Relevant Orders    CBC and differential    Comprehensive metabolic panel    Hemoglobin A1C    LDL cholesterol, direct       Musculoskeletal and Integument    Primary osteoarthritis of right knee     The patient was given Voltaren gel to apply to the right knee q i d  P r n  [place] Relevant Medications    diclofenac sodium (VOLTAREN) 1 %    Other Relevant Orders    CBC and differential    Comprehensive metabolic panel    Hemoglobin A1C    LDL cholesterol, direct       Other    Mixed hyperlipidemia     Her cholesterol numbers are improved with the total cholesterol coming down from 194 to 153, triglycerides coming down from 158 to 42, HDL 55, and LDL coming down from 113 to 70  She is on Pravachol 10 mg daily  Relevant Orders    CBC and differential    Comprehensive metabolic panel    Hemoglobin A1C    LDL cholesterol, direct    Vitamin D deficiency     She is currently on vitamin-D supplementation 2000 units twice a day           Relevant Orders    CBC and differential    Comprehensive metabolic panel    Hemoglobin A1C    LDL cholesterol, direct      Other Visit Diagnoses     Encounter for immunization    -  Primary    Relevant Orders    influenza vaccine, 2618-4822, high-dose, PF 0 5 mL (FLUZONE HIGH-DOSE) (Completed)    CBC and differential    Comprehensive metabolic panel    Hemoglobin A1C    LDL cholesterol, direct                 Diagnoses and all orders for this visit:    Encounter for immunization  -     influenza vaccine, [id number], high-dose, PF 0 5 mL (FLUZONE HIGH-DOSE)  -     CBC and differential; Future  -     Comprehensive metabolic panel; Future  -     Hemoglobin A1C; Future  -     LDL cholesterol, direct; Future    Controlled type 2 diabetes mellitus without complication, without long-term current use of insulin (HCC)  -     CBC and differential; Future  -     Comprehensive metabolic panel; Future  -     Hemoglobin A1C; Future  -     LDL cholesterol, direct; Future    Mixed hyperlipidemia  -     CBC and differential; Future  -     Comprehensive metabolic panel; Future  -     Hemoglobin A1C; Future  -     LDL cholesterol, direct; Future    Essential hypertension  -     CBC and differential; Future  -     Comprehensive metabolic panel; Future  -     Hemoglobin A1C; Future  -     LDL cholesterol, direct; Future    Vitamin D deficiency  -     CBC and differential; Future  -     Comprehensive metabolic panel; Future  -     Hemoglobin A1C; Future  -     LDL cholesterol, direct; Future    Primary osteoarthritis of right knee  -     diclofenac sodium (VOLTAREN) 1 %; Apply 2 g topically 4 (four) times a day  -     CBC and differential; Future  -     Comprehensive metabolic panel; Future  -     Hemoglobin A1C; Future  -     LDL cholesterol, direct; Future              Subjective:      Patient ID: Justine Colunga is a 67 y o  female  CC:    Chief Complaint   Patient presents with    Hyperlipidemia    Diabetes     pt also has blood work to review    bilateral knee pain     she states it is improving, but is still present~cd       HPI:    This is a 67-year-old female who comes in accompanied by her  for her regular 6 month visit    Her only problem is right knee pain which she feels is arthritic and it bothers her in the morning when she gets up when she moves around the pain kind of dissipates  She will be given Voltaren gel to put on 4 times a day p r n  For her knee pain  Her blood pressure is 120/60 and her weight has remained the same at 173 lb  Her BMI is 30 7  Reviewing her labs, her urine for microalbumin was less than 0 9, her glucose was 104, chloride is slightly low at 98, GFR is 52 and her TSH is within normal range  Her cholesterol numbers are at goal with the LDL coming down from 113 to 70  Her hemoglobin A1c remained stable at 5 8%  The following portions of the patient's history were reviewed and updated as appropriate: allergies, current medications, past family history, past medical history, past social history, past surgical history and problem list       Review of Systems   Constitutional: Negative  HENT: Negative  Eyes: Negative  Respiratory: Negative  Cardiovascular: Negative  Gastrointestinal: Negative  Endocrine: Negative  Genitourinary: Negative  Musculoskeletal: Positive for arthralgias  Negative for gait problem and joint swelling  Skin: Negative  Allergic/Immunologic: Negative  Neurological: Negative  Hematological: Negative  Psychiatric/Behavioral: Negative  Data to review:       Objective:    Vitals:    11/05/19 0737   BP: 120/60   BP Location: Left arm   Patient Position: Sitting   Cuff Size: Standard   Pulse: 72   Weight: 78 7 kg (173 lb 9 6 oz)   Height: 5' 3" (1 6 m)        Physical Exam   Constitutional: She is oriented to person, place, and time  She appears well-developed and well-nourished  HENT:   Head: Normocephalic  Right Ear: External ear normal    Left Ear: External ear normal    Mouth/Throat: Oropharynx is clear and moist    Eyes: Pupils are equal, round, and reactive to light  Conjunctivae and EOM are normal    Neck: Normal range of motion  Neck supple  Cardiovascular: Normal rate, regular rhythm and normal heart sounds  Pulmonary/Chest: Effort normal and breath sounds normal    Abdominal: Soft  Bowel sounds are normal    Musculoskeletal: Normal range of motion  She exhibits tenderness  She exhibits no edema or deformity  Mild tenderness to palpation lateral and medial aspect of knee with good range of motion and pulses within normal limits  Neurological: She is alert and oriented to person, place, and time  Skin: Skin is warm  Psychiatric: She has a normal mood and affect  Her behavior is normal  Judgment and thought content normal    Nursing note and vitals reviewed  Body mass index is 30 75 kg/m²

## 2019-11-10 DIAGNOSIS — Z01.818 PREOP GENERAL PHYSICAL EXAM: ICD-10-CM

## 2019-11-10 DIAGNOSIS — Z01.818 PREOP TESTING: ICD-10-CM

## 2019-11-10 DIAGNOSIS — I10 ESSENTIAL HYPERTENSION: ICD-10-CM

## 2019-11-11 RX ORDER — HYDROCHLOROTHIAZIDE 50 MG/1
TABLET ORAL
Qty: 90 TABLET | Refills: 3 | Status: SHIPPED | OUTPATIENT
Start: 2019-11-11 | End: 2020-10-30

## 2019-12-05 ENCOUNTER — OFFICE VISIT (OUTPATIENT)
Dept: FAMILY MEDICINE CLINIC | Facility: CLINIC | Age: 79
End: 2019-12-05
Payer: COMMERCIAL

## 2019-12-05 VITALS
DIASTOLIC BLOOD PRESSURE: 60 MMHG | BODY MASS INDEX: 30.48 KG/M2 | HEIGHT: 63 IN | TEMPERATURE: 98.3 F | SYSTOLIC BLOOD PRESSURE: 124 MMHG | WEIGHT: 172 LBS | HEART RATE: 100 BPM

## 2019-12-05 DIAGNOSIS — R05.9 COUGH: ICD-10-CM

## 2019-12-05 DIAGNOSIS — J01.90 ACUTE NON-RECURRENT SINUSITIS, UNSPECIFIED LOCATION: Primary | ICD-10-CM

## 2019-12-05 DIAGNOSIS — I10 ESSENTIAL HYPERTENSION: ICD-10-CM

## 2019-12-05 PROCEDURE — 3074F SYST BP LT 130 MM HG: CPT | Performed by: FAMILY MEDICINE

## 2019-12-05 PROCEDURE — 3078F DIAST BP <80 MM HG: CPT | Performed by: FAMILY MEDICINE

## 2019-12-05 PROCEDURE — 99213 OFFICE O/P EST LOW 20 MIN: CPT | Performed by: FAMILY MEDICINE

## 2019-12-05 PROCEDURE — 1036F TOBACCO NON-USER: CPT | Performed by: FAMILY MEDICINE

## 2019-12-05 PROCEDURE — 1160F RVW MEDS BY RX/DR IN RCRD: CPT | Performed by: FAMILY MEDICINE

## 2019-12-05 RX ORDER — AZITHROMYCIN 500 MG/1
500 TABLET, FILM COATED ORAL DAILY
Qty: 3 TABLET | Refills: 0 | Status: SHIPPED | OUTPATIENT
Start: 2019-12-05 | End: 2019-12-08

## 2019-12-05 RX ORDER — BENZONATATE 200 MG/1
200 CAPSULE ORAL 3 TIMES DAILY
Qty: 30 CAPSULE | Refills: 1 | Status: SHIPPED | OUTPATIENT
Start: 2019-12-05 | End: 2020-05-15 | Stop reason: ALTCHOICE

## 2019-12-05 NOTE — PROGRESS NOTES
Assessment and Plan:  Follow-up if no better  Problem List Items Addressed This Visit        Respiratory    Acute non-recurrent sinusitis - Primary       She was placed on Zithromax 500 mg 1 daily for 3 days, Coricidin HBP /cold and flu 1 tablet twice a day and 2 at bedtime  Relevant Medications    azithromycin (ZITHROMAX) 500 MG tablet       Cardiovascular and Mediastinum    Essential hypertension       Her blood pressure is stable at 120 4/60 and she is on hydrochlorothiazide 50 mg daily  Other    Cough       She will use Delsym 2 tsp twice a day and was given a prescription for Tessalon Perles 200 mg 3 times a day number 30 with 1 refill  Relevant Medications    benzonatate (TESSALON) 200 MG capsule                 Diagnoses and all orders for this visit:    Acute non-recurrent sinusitis, unspecified location  -     azithromycin (ZITHROMAX) 500 MG tablet; Take 1 tablet (500 mg total) by mouth daily for 3 days    Cough  -     benzonatate (TESSALON) 200 MG capsule; Take 1 capsule (200 mg total) by mouth 3 (three) times a day    Essential hypertension              Subjective:      Patient ID: Alaina Bajwa is a 67 y o  female  CC:    Chief Complaint   Patient presents with    Cold Like Symptoms     Symptoms ongoing for the past 3 weeks  Cough is sometimes productive  Fatigue   Cough     Pt notes she sometimes has urinary incontinence with cough  Ribs hurt  -  lsh    Sore Throat       HPI:    This is a 81-year-old female who comes in because of a productive cough which has been coming and going for the past 3 weeks  Her ribs hurt from the amount of coughing she has been doing and especially at night she has been coughing and keeping herself awake  She does have some fatigue and a slight sore throat  She denies any nausea, vomiting or diarrhea and she has used Tussin p m  With little relief  Her blood pressure is 124/60 and her temperature is 98 3°    Weight is down 1 lb from the previous visit to 172 lb and her BMI is 30 4  The following portions of the patient's history were reviewed and updated as appropriate: allergies, current medications, past family history, past medical history, past social history, past surgical history and problem list       Review of Systems   Constitutional: Positive for fatigue  Negative for chills and fever  HENT: Positive for congestion, postnasal drip, rhinorrhea, sinus pressure and sore throat  Negative for ear pain  Eyes: Negative  Respiratory: Positive for cough  Negative for chest tightness, shortness of breath and wheezing  Cardiovascular: Negative  Negative for chest pain  Gastrointestinal: Negative  Negative for diarrhea, nausea and vomiting  Endocrine: Negative  Genitourinary: Negative  Musculoskeletal: Negative  Skin: Negative  Allergic/Immunologic: Negative  Neurological: Negative  Hematological: Negative  Psychiatric/Behavioral: Negative  Body mass index is 30 47 kg/m²  Data to review:       Objective:    Vitals:    12/05/19 0857   BP: 124/60   BP Location: Left arm   Patient Position: Sitting   Cuff Size: Large   Pulse: 100   Temp: 98 3 °F (36 8 °C)   Weight: 78 kg (172 lb)   Height: 5' 3" (1 6 m)        Physical Exam   Constitutional: She is oriented to person, place, and time  She appears well-developed and well-nourished  HENT:   Head: Normocephalic  Right Ear: Tympanic membrane, external ear and ear canal normal    Left Ear: Tympanic membrane, external ear and ear canal normal    Mouth/Throat: Oropharynx is clear and moist  No oropharyngeal exudate  Positive postnasal drip, +1 erythema of posterior pharynx without exudates  Eyes: Pupils are equal, round, and reactive to light  Conjunctivae and EOM are normal    Neck: Normal range of motion  Neck supple  Cardiovascular: Normal rate, regular rhythm and normal heart sounds     Pulmonary/Chest: Effort normal and breath sounds normal  She has no wheezes  She has no rales  Abdominal: Soft  Bowel sounds are normal    Musculoskeletal: Normal range of motion  Lymphadenopathy:     She has no cervical adenopathy  Neurological: She is alert and oriented to person, place, and time  Skin: Skin is warm  Psychiatric: She has a normal mood and affect   Her behavior is normal  Judgment and thought content normal

## 2019-12-05 NOTE — ASSESSMENT & PLAN NOTE
She will use Delsym 2 tsp twice a day and was given a prescription for Tessalon Perles 200 mg 3 times a day number 30 with 1 refill

## 2019-12-05 NOTE — ASSESSMENT & PLAN NOTE
She was placed on Zithromax 500 mg 1 daily for 3 days, Coricidin HBP /cold and flu 1 tablet twice a day and 2 at bedtime

## 2020-02-07 ENCOUNTER — TELEPHONE (OUTPATIENT)
Dept: GYNECOLOGY | Facility: CLINIC | Age: 80
End: 2020-02-07

## 2020-02-07 NOTE — TELEPHONE ENCOUNTER
LM with patient that [facility] is cancelled    Told her to call our office to schedule but she is not due until 5/21 because she is on medicare

## 2020-03-16 DIAGNOSIS — E78.2 MIXED HYPERLIPIDEMIA: ICD-10-CM

## 2020-03-16 RX ORDER — PRAVASTATIN SODIUM 10 MG
10 TABLET ORAL
Qty: 90 TABLET | Refills: 3 | Status: SHIPPED | OUTPATIENT
Start: 2020-03-16 | End: 2021-03-10

## 2020-05-05 LAB — HBA1C MFR BLD HPLC: 5.5 %

## 2020-05-15 ENCOUNTER — OFFICE VISIT (OUTPATIENT)
Dept: FAMILY MEDICINE CLINIC | Facility: CLINIC | Age: 80
End: 2020-05-15
Payer: COMMERCIAL

## 2020-05-15 VITALS
BODY MASS INDEX: 29.59 KG/M2 | SYSTOLIC BLOOD PRESSURE: 130 MMHG | TEMPERATURE: 98.4 F | HEIGHT: 63 IN | DIASTOLIC BLOOD PRESSURE: 62 MMHG | WEIGHT: 167 LBS | HEART RATE: 76 BPM

## 2020-05-15 DIAGNOSIS — E11.9 CONTROLLED TYPE 2 DIABETES MELLITUS WITHOUT COMPLICATION, WITHOUT LONG-TERM CURRENT USE OF INSULIN (HCC): Primary | ICD-10-CM

## 2020-05-15 DIAGNOSIS — E78.2 MIXED HYPERLIPIDEMIA: ICD-10-CM

## 2020-05-15 DIAGNOSIS — E55.9 VITAMIN D DEFICIENCY: ICD-10-CM

## 2020-05-15 DIAGNOSIS — I10 ESSENTIAL HYPERTENSION: ICD-10-CM

## 2020-05-15 PROBLEM — R05.9 COUGH: Status: RESOLVED | Noted: 2019-12-05 | Resolved: 2020-05-15

## 2020-05-15 PROBLEM — J01.90 ACUTE NON-RECURRENT SINUSITIS: Status: RESOLVED | Noted: 2019-12-05 | Resolved: 2020-05-15

## 2020-05-15 PROCEDURE — 3008F BODY MASS INDEX DOCD: CPT | Performed by: FAMILY MEDICINE

## 2020-05-15 PROCEDURE — 1036F TOBACCO NON-USER: CPT | Performed by: FAMILY MEDICINE

## 2020-05-15 PROCEDURE — G0439 PPPS, SUBSEQ VISIT: HCPCS | Performed by: FAMILY MEDICINE

## 2020-05-15 PROCEDURE — 4040F PNEUMOC VAC/ADMIN/RCVD: CPT | Performed by: FAMILY MEDICINE

## 2020-05-15 PROCEDURE — 1125F AMNT PAIN NOTED PAIN PRSNT: CPT | Performed by: FAMILY MEDICINE

## 2020-05-15 PROCEDURE — 3044F HG A1C LEVEL LT 7.0%: CPT | Performed by: FAMILY MEDICINE

## 2020-05-15 PROCEDURE — 3075F SYST BP GE 130 - 139MM HG: CPT | Performed by: FAMILY MEDICINE

## 2020-05-15 PROCEDURE — 3288F FALL RISK ASSESSMENT DOCD: CPT | Performed by: FAMILY MEDICINE

## 2020-05-15 PROCEDURE — 1170F FXNL STATUS ASSESSED: CPT | Performed by: FAMILY MEDICINE

## 2020-05-15 PROCEDURE — 2022F DILAT RTA XM EVC RTNOPTHY: CPT | Performed by: FAMILY MEDICINE

## 2020-05-15 PROCEDURE — 99214 OFFICE O/P EST MOD 30 MIN: CPT | Performed by: FAMILY MEDICINE

## 2020-05-15 PROCEDURE — 1101F PT FALLS ASSESS-DOCD LE1/YR: CPT | Performed by: FAMILY MEDICINE

## 2020-05-15 PROCEDURE — 3078F DIAST BP <80 MM HG: CPT | Performed by: FAMILY MEDICINE

## 2020-05-15 PROCEDURE — 1160F RVW MEDS BY RX/DR IN RCRD: CPT | Performed by: FAMILY MEDICINE

## 2020-05-15 RX ORDER — GLUCOSAMINE/D3/BOSWELLIA SERRA 1500MG-400
TABLET ORAL
COMMUNITY

## 2020-06-15 DIAGNOSIS — E11.9 TYPE 2 DIABETES MELLITUS WITHOUT COMPLICATION, WITHOUT LONG-TERM CURRENT USE OF INSULIN (HCC): ICD-10-CM

## 2020-10-30 DIAGNOSIS — Z01.818 PREOP TESTING: ICD-10-CM

## 2020-10-30 DIAGNOSIS — I10 ESSENTIAL HYPERTENSION: ICD-10-CM

## 2020-10-30 DIAGNOSIS — Z01.818 PREOP GENERAL PHYSICAL EXAM: ICD-10-CM

## 2020-10-30 RX ORDER — HYDROCHLOROTHIAZIDE 50 MG/1
TABLET ORAL
Qty: 90 TABLET | Refills: 0 | Status: SHIPPED | OUTPATIENT
Start: 2020-10-30 | End: 2021-01-21

## 2020-11-18 LAB
LEFT EYE DIABETIC RETINOPATHY: NORMAL
RIGHT EYE DIABETIC RETINOPATHY: NORMAL

## 2020-11-24 PROBLEM — Z01.818 PREOP GENERAL PHYSICAL EXAM: Status: RESOLVED | Noted: 2018-11-02 | Resolved: 2020-11-24

## 2020-11-24 PROBLEM — Z01.818 PREOP TESTING: Status: RESOLVED | Noted: 2018-11-02 | Resolved: 2020-11-24

## 2020-11-25 ENCOUNTER — OFFICE VISIT (OUTPATIENT)
Dept: FAMILY MEDICINE CLINIC | Facility: CLINIC | Age: 80
End: 2020-11-25
Payer: COMMERCIAL

## 2020-11-25 VITALS
WEIGHT: 167 LBS | HEART RATE: 84 BPM | BODY MASS INDEX: 29.59 KG/M2 | HEIGHT: 63 IN | TEMPERATURE: 97.5 F | DIASTOLIC BLOOD PRESSURE: 64 MMHG | SYSTOLIC BLOOD PRESSURE: 120 MMHG

## 2020-11-25 DIAGNOSIS — N95.2 ATROPHIC VAGINITIS: ICD-10-CM

## 2020-11-25 DIAGNOSIS — L29.9 ITCH OF SKIN: Primary | ICD-10-CM

## 2020-11-25 DIAGNOSIS — E11.9 CONTROLLED TYPE 2 DIABETES MELLITUS WITHOUT COMPLICATION, WITHOUT LONG-TERM CURRENT USE OF INSULIN (HCC): ICD-10-CM

## 2020-11-25 DIAGNOSIS — I10 ESSENTIAL HYPERTENSION: ICD-10-CM

## 2020-11-25 DIAGNOSIS — E55.9 VITAMIN D DEFICIENCY: ICD-10-CM

## 2020-11-25 DIAGNOSIS — E78.2 MIXED HYPERLIPIDEMIA: ICD-10-CM

## 2020-11-25 DIAGNOSIS — M85.80 OSTEOPENIA, UNSPECIFIED LOCATION: ICD-10-CM

## 2020-11-25 PROBLEM — R01.1 MURMUR, CARDIAC: Status: ACTIVE | Noted: 2020-11-25

## 2020-11-25 PROCEDURE — 1036F TOBACCO NON-USER: CPT | Performed by: PHYSICIAN ASSISTANT

## 2020-11-25 PROCEDURE — 3078F DIAST BP <80 MM HG: CPT | Performed by: PHYSICIAN ASSISTANT

## 2020-11-25 PROCEDURE — 1160F RVW MEDS BY RX/DR IN RCRD: CPT | Performed by: PHYSICIAN ASSISTANT

## 2020-11-25 PROCEDURE — 3074F SYST BP LT 130 MM HG: CPT | Performed by: PHYSICIAN ASSISTANT

## 2020-11-25 PROCEDURE — 99214 OFFICE O/P EST MOD 30 MIN: CPT | Performed by: PHYSICIAN ASSISTANT

## 2020-11-25 RX ORDER — CLOBETASOL PROPIONATE 0.5 MG/G
OINTMENT TOPICAL 2 TIMES DAILY
Qty: 30 G | Refills: 0 | Status: SHIPPED | OUTPATIENT
Start: 2020-11-25 | End: 2021-04-02

## 2020-12-02 LAB
CREAT ?TM UR-SCNC: 34.7 UMOL/L
HBA1C MFR BLD HPLC: 5.7 %

## 2020-12-08 DIAGNOSIS — E11.9 CONTROLLED TYPE 2 DIABETES MELLITUS WITHOUT COMPLICATION, WITHOUT LONG-TERM CURRENT USE OF INSULIN (HCC): ICD-10-CM

## 2020-12-08 DIAGNOSIS — E78.2 MIXED HYPERLIPIDEMIA: Primary | ICD-10-CM

## 2020-12-08 NOTE — RESULT ENCOUNTER NOTE
Please let pt know her blood work is great and very stable! No med changes are needed  I have ordered labs to be done in 6 months BEFORE she comes in for her visit  Please mail them to pt as she will forget if not in hand  Thank you!

## 2020-12-10 ENCOUNTER — HOSPITAL ENCOUNTER (OUTPATIENT)
Dept: BONE DENSITY | Facility: MEDICAL CENTER | Age: 80
Discharge: HOME | End: 2020-12-10
Payer: COMMERCIAL

## 2020-12-10 DIAGNOSIS — M85.80 OSTEOPENIA, UNSPECIFIED LOCATION: ICD-10-CM

## 2020-12-10 DIAGNOSIS — M85.80 OSTEOPENIA, UNSPECIFIED LOCATION: Primary | ICD-10-CM

## 2020-12-10 PROCEDURE — 77080 DXA BONE DENSITY AXIAL: CPT

## 2021-01-21 DIAGNOSIS — Z01.818 PREOP TESTING: ICD-10-CM

## 2021-01-21 DIAGNOSIS — Z01.818 PREOP GENERAL PHYSICAL EXAM: ICD-10-CM

## 2021-01-21 DIAGNOSIS — I10 ESSENTIAL HYPERTENSION: ICD-10-CM

## 2021-01-21 RX ORDER — HYDROCHLOROTHIAZIDE 50 MG/1
TABLET ORAL
Qty: 90 TABLET | Refills: 3 | Status: SHIPPED | OUTPATIENT
Start: 2021-01-21 | End: 2022-02-01

## 2021-03-10 DIAGNOSIS — E78.2 MIXED HYPERLIPIDEMIA: ICD-10-CM

## 2021-03-10 RX ORDER — PRAVASTATIN SODIUM 10 MG
TABLET ORAL
Qty: 90 TABLET | Refills: 3 | Status: SHIPPED | OUTPATIENT
Start: 2021-03-10 | End: 2022-02-28

## 2021-04-02 DIAGNOSIS — L29.9 ITCH OF SKIN: ICD-10-CM

## 2021-04-02 RX ORDER — CLOBETASOL PROPIONATE 0.5 MG/G
OINTMENT TOPICAL
Qty: 30 G | Refills: 0 | Status: SHIPPED | OUTPATIENT
Start: 2021-04-02 | End: 2021-08-11

## 2021-05-21 NOTE — PROGRESS NOTES
Assessment/Plan:    Recommended monthly SBE, annual CBE and annual screening mammo  DEXA noted to be up to date  Colonoscopy referral given  Reviewed diet/activity recommendations Calcium 9801-5885 mg and Vit D 600-1000 IU daily  Discussed postmenopausal considerations and symptoms to report  Kegel exercises as instructed  RTO in one year for routine annual gyn exam or sooner PRN  Diagnoses and all orders for this visit:    History of ovarian cancer    Encounter for gynecological examination (general) (routine) without abnormal findings    Colon cancer screening  -     Ambulatory referral to Gastroenterology; Future        Subjective:      Patient ID: Gretel Michel is a 79 y o  female  This patient presents for routine annual gyn exam  Former Dr Dinesh Sorensen patient  She has a personal history of stage IIIC granulosa cell tumor in 1994  She had a hysterectomy with BSO and postop chemotherapy  She has been followed with inhibin A and B, which have all been normal  Her family history is significant for breast cancer in her sister and in her niece at age is 61 and 48 respectively  Her mother had ovarian cancer at age 61  The patient has had genetic testing and is negative for known mutations  Patient pulled a muscle under right and left arms carrying groceries  She denies vaginal bleeding or spotting, VM sx, pelvic pain,  abnormal discharge, bowel/bladder dysfunction, depression/anx  , not sexually active and is monogamous  Pap/HPV up to date and normal, 5/18/18  Mammography up to date and normal, 3/18/21  Osteoporosis screening up to date, osteopenia, 12/10/20  Colonoscopy done in 2010 per patient  The following portions of the patient's history were reviewed and updated as appropriate: allergies, current medications, past family history, past medical history, past social history, past surgical history and problem list     Review of Systems   Constitutional: Negative  Respiratory: Negative  Cardiovascular: Negative  Gastrointestinal: Negative  Endocrine: Negative  Genitourinary: Negative for dysuria, frequency, pelvic pain, urgency, vaginal bleeding, vaginal discharge and vaginal pain  Musculoskeletal: Negative  Skin: Negative  Neurological: Negative  Psychiatric/Behavioral: Negative  Objective:      /78 (BP Location: Left arm, Patient Position: Sitting, Cuff Size: Standard)   Pulse 89   Ht 5' 2" (1 575 m)   Wt 78 9 kg (174 lb)   BMI 31 83 kg/m²          Physical Exam  Vitals signs and nursing note reviewed  Exam conducted with a chaperone present  Constitutional:       Appearance: Normal appearance  She is well-developed  HENT:      Head: Normocephalic and atraumatic  Neck:      Musculoskeletal: Normal range of motion and neck supple  Thyroid: No thyroid mass or thyromegaly  Cardiovascular:      Rate and Rhythm: Normal rate and regular rhythm  Heart sounds: Normal heart sounds  Pulmonary:      Effort: Pulmonary effort is normal       Breath sounds: Normal breath sounds  Chest:      Breasts: Breasts are symmetrical          Right: No inverted nipple, mass, nipple discharge, skin change or tenderness  Left: No inverted nipple, mass, nipple discharge, skin change or tenderness  Abdominal:      General: Bowel sounds are normal       Palpations: Abdomen is soft  Tenderness: There is no abdominal tenderness  Hernia: There is no hernia in the left inguinal area or right inguinal area  Genitourinary:     General: Normal vulva  Exam position: Supine  Pubic Area: No rash  Labia:         Right: No rash, tenderness, lesion or injury  Left: No rash, tenderness, lesion or injury  Urethra: No prolapse, urethral pain, urethral swelling or urethral lesion  Vagina: Normal  No signs of injury and foreign body   No vaginal discharge, erythema, tenderness, bleeding, lesions or prolapsed vaginal walls  Rectum: No external hemorrhoid  Comments: Urethra normal without lesions  No bladder tenderness  Cervix, uterus, adnexa absent, no masses or tenderness on BME  Musculoskeletal: Normal range of motion  Lymphadenopathy:      Lower Body: No right inguinal adenopathy  No left inguinal adenopathy  Skin:     General: Skin is warm and dry  Neurological:      Mental Status: She is alert and oriented to person, place, and time  Psychiatric:         Speech: Speech normal          Behavior: Behavior normal  Behavior is cooperative

## 2021-05-24 ENCOUNTER — ANNUAL EXAM (OUTPATIENT)
Dept: GYNECOLOGY | Facility: CLINIC | Age: 81
End: 2021-05-24
Payer: COMMERCIAL

## 2021-05-24 ENCOUNTER — OFFICE VISIT (OUTPATIENT)
Dept: FAMILY MEDICINE CLINIC | Facility: CLINIC | Age: 81
End: 2021-05-24
Payer: COMMERCIAL

## 2021-05-24 VITALS
BODY MASS INDEX: 32.02 KG/M2 | HEART RATE: 89 BPM | WEIGHT: 174 LBS | DIASTOLIC BLOOD PRESSURE: 78 MMHG | SYSTOLIC BLOOD PRESSURE: 140 MMHG | HEIGHT: 62 IN

## 2021-05-24 VITALS
BODY MASS INDEX: 32.5 KG/M2 | SYSTOLIC BLOOD PRESSURE: 180 MMHG | DIASTOLIC BLOOD PRESSURE: 78 MMHG | TEMPERATURE: 97.4 F | HEART RATE: 92 BPM | WEIGHT: 176.6 LBS | HEIGHT: 62 IN

## 2021-05-24 DIAGNOSIS — R31.9 HEMATURIA, UNSPECIFIED TYPE: Primary | ICD-10-CM

## 2021-05-24 DIAGNOSIS — Z12.11 COLON CANCER SCREENING: ICD-10-CM

## 2021-05-24 DIAGNOSIS — Z01.419 ENCOUNTER FOR GYNECOLOGICAL EXAMINATION (GENERAL) (ROUTINE) WITHOUT ABNORMAL FINDINGS: ICD-10-CM

## 2021-05-24 DIAGNOSIS — Z85.43 HISTORY OF OVARIAN CANCER: Primary | ICD-10-CM

## 2021-05-24 DIAGNOSIS — I10 ESSENTIAL HYPERTENSION: ICD-10-CM

## 2021-05-24 DIAGNOSIS — Z00.00 MEDICARE ANNUAL WELLNESS VISIT, SUBSEQUENT: ICD-10-CM

## 2021-05-24 DIAGNOSIS — R31.0 GROSS HEMATURIA: ICD-10-CM

## 2021-05-24 LAB
SL AMB  POCT GLUCOSE, UA: ABNORMAL
SL AMB LEUKOCYTE ESTERASE,UA: ABNORMAL
SL AMB POCT BILIRUBIN,UA: ABNORMAL
SL AMB POCT BLOOD,UA: ABNORMAL
SL AMB POCT CLARITY,UA: ABNORMAL
SL AMB POCT COLOR,UA: YELLOW
SL AMB POCT KETONES,UA: ABNORMAL
SL AMB POCT NITRITE,UA: ABNORMAL
SL AMB POCT PH,UA: 7
SL AMB POCT SPECIFIC GRAVITY,UA: 1.01
SL AMB POCT URINE PROTEIN: ABNORMAL
SL AMB POCT UROBILINOGEN: 1

## 2021-05-24 PROCEDURE — 3078F DIAST BP <80 MM HG: CPT | Performed by: PHYSICIAN ASSISTANT

## 2021-05-24 PROCEDURE — G0439 PPPS, SUBSEQ VISIT: HCPCS | Performed by: PHYSICIAN ASSISTANT

## 2021-05-24 PROCEDURE — 87086 URINE CULTURE/COLONY COUNT: CPT | Performed by: PHYSICIAN ASSISTANT

## 2021-05-24 PROCEDURE — 99214 OFFICE O/P EST MOD 30 MIN: CPT | Performed by: PHYSICIAN ASSISTANT

## 2021-05-24 PROCEDURE — 3077F SYST BP >= 140 MM HG: CPT | Performed by: PHYSICIAN ASSISTANT

## 2021-05-24 PROCEDURE — 1101F PT FALLS ASSESS-DOCD LE1/YR: CPT | Performed by: PHYSICIAN ASSISTANT

## 2021-05-24 PROCEDURE — 3288F FALL RISK ASSESSMENT DOCD: CPT | Performed by: PHYSICIAN ASSISTANT

## 2021-05-24 PROCEDURE — 3725F SCREEN DEPRESSION PERFORMED: CPT | Performed by: PHYSICIAN ASSISTANT

## 2021-05-24 PROCEDURE — 81002 URINALYSIS NONAUTO W/O SCOPE: CPT | Performed by: PHYSICIAN ASSISTANT

## 2021-05-24 PROCEDURE — 1125F AMNT PAIN NOTED PAIN PRSNT: CPT | Performed by: PHYSICIAN ASSISTANT

## 2021-05-24 PROCEDURE — 1170F FXNL STATUS ASSESSED: CPT | Performed by: PHYSICIAN ASSISTANT

## 2021-05-24 PROCEDURE — G0101 CA SCREEN;PELVIC/BREAST EXAM: HCPCS | Performed by: OBSTETRICS & GYNECOLOGY

## 2021-05-24 NOTE — PATIENT INSTRUCTIONS
Problem List Items Addressed This Visit        Cardiovascular and Mediastinum    Essential hypertension      Blood pressure is elevated today she feels asymptomatic  She had a a gyn appointment earlier it this morning with a documented blood pressure of 140 and today here is systolic late is 126  It may have something to do with the fact that the patient had ham and 2 cups of coffee with a walk in between appointments  She has a home blood pressure monitor and will monitor it today and tomorrow and if her  Blood pressure does not return to normal we will adjust her medication  She is to avoid salt adding to food or salty pre prepared food and caffeine  Other    Medicare annual wellness visit, subsequent     [address] [facility] with 5 wishes given today  COVID vaccine shingles vaccine pneumonia vaccine all up-to-date  Mammogram and DEXA up-to-date  Blood work up-to-date             Other Visit Diagnoses     Hematuria, unspecified type    -  Primary    Relevant Orders    POCT urine dip (Completed)    Urine culture

## 2021-05-24 NOTE — ASSESSMENT & PLAN NOTE
Patient has no UTI symptoms have spot of  Blood when wiping after urination  Urine dip in the office within normal limits  Will send for culture

## 2021-05-24 NOTE — ASSESSMENT & PLAN NOTE
[employer] with 5 wishes given today  COVID vaccine shingles vaccine pneumonia vaccine all up-to-date  Mammogram and DEXA up-to-date  Blood work up-to-date

## 2021-05-24 NOTE — PROGRESS NOTES
Assessment and Plan:    Problem List Items Addressed This Visit        Cardiovascular and Mediastinum    Essential hypertension      Blood pressure is elevated today she feels asymptomatic  She had a a gyn appointment earlier it this morning with a documented blood pressure of 140 and today here is systolic late is 811  It may have something to do with the fact that the patient had ham and 2 cups of coffee with a walk in between appointments  She has a home blood pressure monitor and will monitor it today and tomorrow and if her  Blood pressure does not return to normal we will adjust her medication  She is to avoid salt adding to food or salty pre prepared food and caffeine  Other    Medicare annual wellness visit, subsequent     [address] [facility] with 5 wishes given today  COVID vaccine shingles vaccine pneumonia vaccine all up-to-date  Mammogram and DEXA up-to-date  Blood work up-to-date  Other Visit Diagnoses     Hematuria, unspecified type    -  Primary    Relevant Orders    POCT urine dip (Completed)    Urine culture                 Diagnoses and all orders for this visit:    Hematuria, unspecified type  -     POCT urine dip  -     Cancel: Urine culture; Future  -     Urine culture; Future  -     Urine culture    Medicare annual wellness visit, subsequent    Essential hypertension              Subjective:      Patient ID: Myna Friend is a 79 y o  female  CC:    Chief Complaint   Patient presents with    Blood in Urine     pt staets she noticed a 'spot" when wiping after urinating a week ago  pt denies pain, foul smell or any other complaints  pt states she has gained weight where " its pressure pressure down there'  and thinks she has an infection  HPI:    Patient states that 1 day last week she wiped after urinating and there was a red spot  She has not seen it since  She has no urinary frequency burning no back pain or pelvic pain    She states the last time she had a urinary tract infection she had blood in her urine in the 80s with no other symptoms so she wanted to get it checked out  Patient states that she had an  Appointment this morning with her gyn in her blood pressure was acceptable and today our visit a few hours later it is very elevated at 180 over 80  Patient did go to breakfast in between 2 appointments and did have some ham and 2 cups of coffee and went for a walk for 20 minutes as well  She states she feels fine  She does have a blood pressure monitor at home but has not used in some time      The following portions of the patient's history were reviewed and updated as appropriate: allergies, current medications, past family history, past medical history, past social history, past surgical history and problem list       Review of Systems   Constitutional: Negative  HENT: Negative  Eyes: Negative  Respiratory: Negative  Cardiovascular: Negative  Gastrointestinal: Negative  Endocrine: Negative  Genitourinary: Positive for hematuria  Vaginal pressure   Musculoskeletal: Negative  Skin: Negative  Allergic/Immunologic: Negative  Neurological: Negative  Hematological: Negative  Psychiatric/Behavioral: Negative  Data to review:       Objective:    Vitals:    05/24/21 1116   BP: (!) 180/78   BP Location: Left arm   Patient Position: Sitting   Cuff Size: Adult   Pulse: 92   Temp: (!) 97 4 °F (36 3 °C)   TempSrc: Temporal   Weight: 80 1 kg (176 lb 9 6 oz)   Height: 5' 2" (1 575 m)        Physical Exam  Vitals signs and nursing note reviewed  Constitutional:       Appearance: Normal appearance  She is well-developed  HENT:      Head: Normocephalic and atraumatic  Eyes:      General: Lids are normal       Conjunctiva/sclera: Conjunctivae normal       Pupils: Pupils are equal, round, and reactive to light  Cardiovascular:      Rate and Rhythm: Normal rate and regular rhythm  Heart sounds: No murmur  Pulmonary:      Effort: Pulmonary effort is normal       Breath sounds: Normal breath sounds  Abdominal:      General: Abdomen is protuberant  Bowel sounds are normal       Tenderness: There is no abdominal tenderness  There is no right CVA tenderness or left CVA tenderness  Skin:     General: Skin is warm and dry  Neurological:      General: No focal deficit present  Mental Status: She is alert  Coordination: Coordination is intact  Psychiatric:         Mood and Affect: Mood normal          Behavior: Behavior normal  Behavior is cooperative  Thought Content: Thought content normal          Judgment: Judgment normal          BMI Counseling: Body mass index is 32 3 kg/m²  The BMI is above normal  Nutrition recommendations include reducing portion sizes, decreasing overall calorie intake, 3-5 servings of fruits/vegetables daily, reducing fast food intake, consuming healthier snacks, decreasing soda and/or juice intake, moderation in carbohydrate intake, increasing intake of lean protein, reducing intake of saturated fat and trans fat and reducing intake of cholesterol  BMI Counseling: Body mass index is 32 3 kg/m²  The BMI is above normal  Nutrition recommendations include decreasing portion sizes, encouraging healthy choices of fruits and vegetables, decreasing fast food intake, consuming healthier snacks, limiting drinks that contain sugar, moderation in carbohydrate intake, increasing intake of lean protein, reducing intake of saturated and trans fat and reducing intake of cholesterol  Exercise recommendations include exercising 3-5 times per week  No pharmacotherapy was ordered

## 2021-05-24 NOTE — ASSESSMENT & PLAN NOTE
Blood pressure is elevated today she feels asymptomatic  She had a a gyn appointment earlier it this morning with a documented blood pressure of 140 and today here is systolic late is 579  It may have something to do with the fact that the patient had ham and 2 cups of coffee with a walk in between appointments  She has a home blood pressure monitor and will monitor it today and tomorrow and if her  Blood pressure does not return to normal we will adjust her medication  She is to avoid salt adding to food or salty pre prepared food and caffeine

## 2021-05-24 NOTE — PROGRESS NOTES
Assessment and Plan:     Problem List Items Addressed This Visit        Cardiovascular and Mediastinum    Essential hypertension      Blood pressure is elevated today she feels asymptomatic  She had a a gyn appointment earlier it this morning with a documented blood pressure of 140 and today here is systolic late is 596  It may have something to do with the fact that the patient had ham and 2 cups of coffee with a walk in between appointments  She has a home blood pressure monitor and will monitor it today and tomorrow and if her  Blood pressure does not return to normal we will adjust her medication  She is to avoid salt adding to food or salty pre prepared food and caffeine  Genitourinary    Gross hematuria      Patient has no UTI symptoms have spot of  Blood when wiping after urination  Urine dip in the office within normal limits  Will send for culture  Other    Medicare annual wellness visit, subsequent     [address] with 5 wishes given today  COVID vaccine shingles vaccine pneumonia vaccine all up-to-date  Mammogram and DEXA up-to-date  Blood work up-to-date  Other Visit Diagnoses     Hematuria, unspecified type    -  Primary    Relevant Orders    POCT urine dip (Completed)    Urine culture           Preventive health issues were discussed with patient, and age appropriate screening tests were ordered as noted in patient's After Visit Summary  Personalized health advice and appropriate referrals for health education or preventive services given if needed, as noted in patient's After Visit Summary       History of Present Illness:     Patient presents for Medicare Annual Wellness visit    Patient Care Team:  Maxine Cabello PA-C as PCP - General (Family Medicine)  Marci Bose MD (Inactive) (Gynecology)  Radha Davis DPM (Podiatry)  Edmund Hernandez MD (Ophthalmology)     Problem List:     Patient Active Problem List   Diagnosis    Allergic rhinitis    Alopecia  Mixed hyperlipidemia    Essential hypertension    Osteopenia    Soft tissue neoplasm    Synovial cyst    Vitamin D deficiency    Chronic fatigue    Controlled type 2 diabetes mellitus without complication, without long-term current use of insulin (HCC)    Primary osteoarthritis of right knee    Atrophic vaginitis    Murmur, cardiac    Medicare annual wellness visit, subsequent    Gross hematuria      Past Medical and Surgical History:     Past Medical History:   Diagnosis Date    Hemorrhoids      Past Surgical History:   Procedure Laterality Date    BREAST BIOPSY      Bx breast percutan needle core use image guide (stereotactic)    HEMORROIDECTOMY      TOTAL ABDOMINAL HYSTERECTOMY        Family History:     Family History   Problem Relation Age of Onset    Heart disease Mother     Lung cancer Mother     Uterine cancer Mother     Heart disease Father     Breast cancer Sister     Diabetes Maternal Grandmother       Social History:        Social History     Socioeconomic History    Marital status: /Civil Union     Spouse name: None    Number of children: None    Years of education: None    Highest education level: None   Occupational History    None   Social Needs    Financial resource strain: None    Food insecurity     Worry: None     Inability: None    Transportation needs     Medical: None     Non-medical: None   Tobacco Use    Smoking status: Never Smoker    Smokeless tobacco: Never Used   Substance and Sexual Activity    Alcohol use: Yes     Comment: rarely    Drug use: Never    Sexual activity: None   Lifestyle    Physical activity     Days per week: None     Minutes per session: None    Stress: None   Relationships    Social connections     Talks on phone: None     Gets together: None     Attends Confucianist service: None     Active member of club or organization: None     Attends meetings of clubs or organizations: None     Relationship status: None    Intimate partner violence     Fear of current or ex partner: None     Emotionally abused: None     Physically abused: None     Forced sexual activity: None   Other Topics Concern    None   Social History Narrative        Retired      Medications and Allergies:     Current Outpatient Medications   Medication Sig Dispense Refill    Biotin 57995 MCG TABS Take by mouth      Cholecalciferol (VITAMIN D) 2000 units CAPS Take 1 capsule by mouth 2 (two) times a day      clobetasol (TEMOVATE) 0 05 % ointment APPLY TO AFFECTED AREA TWICE A DAY 30 g 0    diclofenac sodium (VOLTAREN) 1 % Apply 2 g topically 4 (four) times a day 1 Tube 3    hydrochlorothiazide (HYDRODIURIL) 50 mg tablet TAKE 1 TABLET BY MOUTH EVERY DAY 90 tablet 3    metFORMIN (GLUCOPHAGE) 500 mg tablet TAKE 1 TABLET BY MOUTH EVERY DAY AS DIRECTED 90 tablet 3    minoxidil (ROGAINE) 2 % external solution Apply topically 2 (two) times a day      pravastatin (PRAVACHOL) 10 mg tablet TAKE 1 TABLET BY MOUTH DAILY AT BEDTIME 90 tablet 3     No current facility-administered medications for this visit  Allergies   Allergen Reactions    Iodinated Diagnostic Agents Hives      Immunizations:     Immunization History   Administered Date(s) Administered    INFLUENZA 09/28/2020    Influenza Split High Dose Preservative Free IM 10/19/2015, 10/21/2016, 10/31/2017    Influenza, high dose seasonal 0 7 mL 09/21/2018, 11/05/2019    Influenza, seasonal, injectable 11/06/2010    Pneumococcal Conjugate 13-Valent 04/05/2019    Pneumococcal Polysaccharide PPV23 02/18/2011    SARS-CoV-2 / COVID-19 mRNA IM (Moderna) 01/11/2021, 02/08/2021    Zoster 07/21/2012    Zoster Vaccine Recombinant 07/01/2020, 09/28/2020      Health Maintenance: There are no preventive care reminders to display for this patient        Topic Date Due    DTaP,Tdap,and Td Vaccines (1 - Tdap) Never done      Medicare Health Risk Assessment:     BP (!) 180/78 (BP Location: Left arm, Patient Position: Sitting, Cuff Size: Adult)   Pulse 92   Temp (!) 97 4 °F (36 3 °C) (Temporal)   Ht 5' 2" (1 575 m) Comment: on file  Wt 80 1 kg (176 lb 9 6 oz)   BMI 32 30 kg/m²      Jazz Soliz is here for her Subsequent Wellness visit  Health Risk Assessment:   Patient rates overall health as excellent  Patient feels that their physical health rating is same  Patient is very satisfied with their life  Eyesight was rated as slightly worse  Patient feels that their emotional and mental health rating is same  Patients states they are never, rarely angry  Patient states they are never, rarely unusually tired/fatigued  Pain experienced in the last 7 days has been some  Patient's pain rating has been 3/10  Patient states that she has experienced no weight loss or gain in last 6 months  Depression Screening:   PHQ-2 Score: 0      Fall Risk Screening: In the past year, patient has experienced: no history of falling in past year      Urinary Incontinence Screening:   Patient has leaked urine accidently in the last six months  Home Safety:  Patient does not have trouble with stairs inside or outside of their home  Patient has working smoke alarms and has working carbon monoxide detector  Home safety hazards include: none  Nutrition:   Current diet is Regular  Medications:   Patient is currently taking over-the-counter supplements  OTC medications include: see medication list  Patient is able to manage medications  Activities of Daily Living (ADLs)/Instrumental Activities of Daily Living (IADLs):   Walk and transfer into and out of bed and chair?: Yes  Dress and groom yourself?: Yes    Bathe or shower yourself?: Yes    Feed yourself?  Yes  Do your laundry/housekeeping?: Yes  Manage your money, pay your bills and track your expenses?: Yes  Make your own meals?: Yes    Do your own shopping?: Yes    Previous Hospitalizations:   Any hospitalizations or ED visits within the last 12 months?: No      Advance Care Planning:     Five wishes given: Yes      Cognitive Screening:   Provider or family/friend/caregiver concerned regarding cognition?: No    PREVENTIVE SCREENINGS      Cardiovascular Screening:    General: Screening Not Indicated and History Lipid Disorder      Diabetes Screening:     General: Screening Not Indicated and History Diabetes      Breast Cancer Screening:     General: Screening Current      Cervical Cancer Screening:    General: Screening Not Indicated      Osteoporosis Screening:    General: Screening Current      Abdominal Aortic Aneurysm (AAA) Screening:        General: Screening Not Indicated      Lung Cancer Screening:     General: Screening Not Indicated      Hepatitis C Screening:    General: Patient Declines    Screening, Brief Intervention, and Referral to Treatment (SBIRT)    Screening  Typical number of drinks in a day: 0  Typical number of drinks in a week: 0  Interpretation: Low risk drinking behavior      Single Item Drug Screening:  How often have you used an illegal drug (including marijuana) or a prescription medication for non-medical reasons in the past year? never    Single Item Drug Screen Score: 0  Interpretation: Negative screen for possible drug use disorder      Nik Mckinney PA-C

## 2021-05-25 LAB — BACTERIA UR CULT: NORMAL

## 2021-06-03 ENCOUNTER — PREP FOR PROCEDURE (OUTPATIENT)
Dept: GASTROENTEROLOGY | Facility: CLINIC | Age: 81
End: 2021-06-03

## 2021-06-03 ENCOUNTER — TELEPHONE (OUTPATIENT)
Dept: GASTROENTEROLOGY | Facility: CLINIC | Age: 81
End: 2021-06-03

## 2021-06-03 DIAGNOSIS — Z12.11 SPECIAL SCREENING FOR MALIGNANT NEOPLASMS, COLON: Primary | ICD-10-CM

## 2021-06-03 LAB
LEFT EYE DIABETIC RETINOPATHY: NORMAL
RIGHT EYE DIABETIC RETINOPATHY: NORMAL

## 2021-06-03 NOTE — TELEPHONE ENCOUNTER
TC from  to schedule OA screening colonoscopy  Colon scheduled for Wednesday, 7/28/21, at [address] with Dr Rosi Chan/Miralax, and diabetic prep instructions mailed to patient's home

## 2021-06-04 LAB — HBA1C MFR BLD HPLC: 5.4 %

## 2021-06-06 DIAGNOSIS — E11.9 TYPE 2 DIABETES MELLITUS WITHOUT COMPLICATION, WITHOUT LONG-TERM CURRENT USE OF INSULIN (HCC): ICD-10-CM

## 2021-06-08 ENCOUNTER — RA CDI HCC (OUTPATIENT)
Dept: OTHER | Facility: HOSPITAL | Age: 81
End: 2021-06-08

## 2021-06-08 NOTE — PROGRESS NOTES
Assessment & Plan:     Enhanced Visit       Subjective:     Patient ID: Gerri Whitaker is a 79 y o  female     No chief complaint on file  HPI    Review of Systems    Objective: There were no vitals taken for this visit  Problem List Items Addressed This Visit     None          Physical Exam       6[facility]     [facility]  [facility]             Chart reviewed, (number of) suggestions sent to provider: 2     Problem listed updated   Provider Accepted, (number of) suggestions accepted: 2               Patients insurance company: [employer]     Visit status: Patient arrived for their scheduled appointment          Chart reviewed, (number of) suggestions sent to provider: 2    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

## 2021-06-11 ENCOUNTER — OFFICE VISIT (OUTPATIENT)
Dept: FAMILY MEDICINE CLINIC | Facility: CLINIC | Age: 81
End: 2021-06-11
Payer: COMMERCIAL

## 2021-06-11 VITALS
WEIGHT: 170 LBS | SYSTOLIC BLOOD PRESSURE: 132 MMHG | HEIGHT: 62 IN | TEMPERATURE: 98 F | HEART RATE: 72 BPM | BODY MASS INDEX: 31.28 KG/M2 | DIASTOLIC BLOOD PRESSURE: 70 MMHG

## 2021-06-11 DIAGNOSIS — E11.9 CONTROLLED TYPE 2 DIABETES MELLITUS WITHOUT COMPLICATION, WITHOUT LONG-TERM CURRENT USE OF INSULIN (HCC): Primary | ICD-10-CM

## 2021-06-11 DIAGNOSIS — I83.92 ASYMPTOMATIC VARICOSE VEINS OF LEFT LOWER EXTREMITY: ICD-10-CM

## 2021-06-11 DIAGNOSIS — E55.9 VITAMIN D DEFICIENCY: ICD-10-CM

## 2021-06-11 DIAGNOSIS — M85.80 OSTEOPENIA, UNSPECIFIED LOCATION: ICD-10-CM

## 2021-06-11 DIAGNOSIS — E78.2 MIXED HYPERLIPIDEMIA: ICD-10-CM

## 2021-06-11 DIAGNOSIS — I10 ESSENTIAL HYPERTENSION: ICD-10-CM

## 2021-06-11 PROCEDURE — 1036F TOBACCO NON-USER: CPT | Performed by: PHYSICIAN ASSISTANT

## 2021-06-11 PROCEDURE — 1160F RVW MEDS BY RX/DR IN RCRD: CPT | Performed by: PHYSICIAN ASSISTANT

## 2021-06-11 PROCEDURE — 99214 OFFICE O/P EST MOD 30 MIN: CPT | Performed by: PHYSICIAN ASSISTANT

## 2021-06-11 NOTE — ASSESSMENT & PLAN NOTE
Patient is taking Glucophage only and this is keeping her A1c at 5 4 with a fasting sugar of 105  Continue recheck 6 months    Lab Results   Component Value Date    HGBA1C 5 4 06/04/2021

## 2021-06-11 NOTE — PROGRESS NOTES
Assessment and Plan:    Problem List Items Addressed This Visit        Endocrine    Controlled type 2 diabetes mellitus without complication, without long-term current use of insulin ([facility] [address] ) - Primary       Patient is taking Glucophage only and this is keeping her A1c at 5 4 with a fasting sugar of 105  Continue recheck 6 months  Lab Results   Component Value Date    HGBA1C 5 4 06/04/2021            Relevant Orders    Hemoglobin A1C    Comprehensive metabolic panel    Microalbumin / creatinine urine ratio       Cardiovascular and Mediastinum    Essential hypertension       Stable on current medication continue recheck 6 months  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     Pt  To wear compression stockings  And refer to vascular if no help  Musculoskeletal and Integument    Osteopenia       DEXA due in 2022  Other    Mixed hyperlipidemia      Patient is on Pravachol 10 mg once daily keeping her LDL at goal at 73  Continue recheck 6 months  Relevant Orders    Lipid Panel with Direct LDL reflex    Vitamin D deficiency    Relevant Orders    Vitamin D 25 hydroxy                 Diagnoses and all orders for this visit:    Controlled type 2 diabetes mellitus without complication, without long-term current use of insulin ([address] )  -     Hemoglobin A1C; Future  -     Comprehensive metabolic panel; Future  -     Microalbumin / creatinine urine ratio; Future    Essential hypertension    Osteopenia, unspecified location    Mixed hyperlipidemia  -     Lipid Panel with Direct LDL reflex; Future    Vitamin D deficiency  -     Vitamin D 25 hydroxy; Future    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              Subjective:      Patient ID: Will Aviles is a 79 y o  female  CC:    Chief Complaint   Patient presents with    Diabetes     Review BW results         Hypertension    Hyperlipidemia    Other     Pt has trouble with vein LLE - questions if treatment is needed  -  [facility]       HPI:      Marky Flynn is here for chronic conditions f/u including the diagnosis of Controlled type 2 diabetes mellitus without complication, without long-term current use of insulin ([facility])  (primary encounter diagnosis)  Essential hypertension  Osteopenia, unspecified location  Mixed hyperlipidemia  Vitamin d deficiency   Pt  states they are taking all medications as directed without complaints or side effects   Pt  had labs done prior to today's visit which included Recent Results (from the past 672 hour(s))  -POCT urine dip  Collection Time: 05/24/21 11:27 AM       Result                      Value             Ref Range           LEUKOCYTE ESTERASE,UA       neg                                   NITRITE,UA                  neg                                   SL AMB POCT UROBILINOG*     1 0                                   POCT URINE PROTEIN          neg                                    PH,UA                      7 0                                   BLOOD,UA                    neg                                   SPECIFIC GRAVITY,UA         1 015                                 KETONES,UA                  trace                                 BILIRUBIN,UA                neg                                   GLUCOSE, UA                 neg                                    COLOR,UA                   yellow                                CLARITY,UA                  cloudy                           -Urine culture  Collection Time: 05/24/21 12:01 PM  Specimen: Urine, Clean Catch       Result                      Value             Ref Range           Urine Culture                                                 20,000-29,000 cfu/ml   - Diabetes Eye Exam  Collection Time: 06/03/21  9:48 AM       Result                      Value             Ref Range           Right Eye Diabetic Ret*     None                                  Left Eye Diabetic Reti*     None -Hemoglobin A1C  Collection Time: 06/04/21 12:00 AM       Result                      Value             Ref Range           Hemoglobin A1C              5 4                              -HEMOGLOBIN A1C  Collection Time: 06/04/21  7:25 AM       Result                      Value             Ref Range           Hemoglobin A1C              5 4               <5 7 %              eAG, EST AVG Glucose        108               <154 mg/dL           The following portions of the patient's history were reviewed and updated as appropriate: allergies, current medications, past family history, past medical history, past social history, past surgical history and problem list       Review of Systems   Constitutional: Negative  HENT: Negative  Eyes: Negative  Respiratory: Negative  Cardiovascular: Negative  Gastrointestinal: Negative  Endocrine: Negative  Genitourinary: Negative  Musculoskeletal: Negative  Skin: Negative  Allergic/Immunologic: Negative  Neurological: Negative  Hematological: Negative  Psychiatric/Behavioral: Negative  Data to review:       Objective:    Vitals:    06/11/21 0728   BP: 132/70   BP Location: Left leg   Patient Position: Sitting   Cuff Size: Large   Pulse: 72   Temp: 98 °F (36 7 °C)   TempSrc: Oral   Weight: 77 1 kg (170 lb)   Height: 5' 2" (1 575 m)        Physical Exam  Vitals signs and nursing note reviewed  Constitutional:       Appearance: Normal appearance  She is well-developed  HENT:      Head: Normocephalic and atraumatic  Eyes:      General: Lids are normal       Conjunctiva/sclera: Conjunctivae normal       Pupils: Pupils are equal, round, and reactive to light  Cardiovascular:      Rate and Rhythm: Normal rate and regular rhythm  Heart sounds: No murmur  Pulmonary:      Effort: Pulmonary effort is normal       Breath sounds: Normal breath sounds  Skin:     General: Skin is warm and dry     Neurological:      General: No focal deficit present  Mental Status: She is alert  Coordination: Coordination is intact  Psychiatric:         Mood and Affect: Mood normal          Behavior: Behavior normal  Behavior is cooperative  Thought Content:  Thought content normal          Judgment: Judgment normal

## 2021-06-11 NOTE — PATIENT INSTRUCTIONS
Problem List Items Addressed This Visit        Endocrine    Controlled type 2 diabetes mellitus without complication, without long-term current use of insulin ([facility] [place] [address] ) - Primary       Patient is taking Glucophage only and this is keeping her A1c at 5 4 with a fasting sugar of 105  Continue recheck 6 months  Lab Results   Component Value Date    HGBA1C 5 4 06/04/2021               Cardiovascular and Mediastinum    Essential hypertension       Stable on current medication continue recheck 6 months  Musculoskeletal and Integument    Osteopenia       DEXA due in 2022  Other    Mixed hyperlipidemia      Patient is on Pravachol 10 mg once daily keeping her LDL at goal at 73  Continue recheck 6 months           Vitamin D deficiency

## 2021-06-12 PROBLEM — E11.22 TYPE 2 DIABETES MELLITUS WITH DIABETIC CHRONIC KIDNEY DISEASE (HCC): Status: ACTIVE | Noted: 2021-06-12

## 2021-06-12 PROBLEM — E11.39 TYPE 2 DIABETES MELLITUS WITH OPHTHALMIC MANIFESTATIONS (HCC): Status: ACTIVE | Noted: 2021-06-12

## 2021-07-27 ENCOUNTER — TELEPHONE (OUTPATIENT)
Dept: GASTROENTEROLOGY | Facility: MEDICAL CENTER | Age: 81
End: 2021-07-27

## 2021-07-28 ENCOUNTER — HOSPITAL ENCOUNTER (OUTPATIENT)
Dept: GASTROENTEROLOGY | Facility: MEDICAL CENTER | Age: 81
Setting detail: OUTPATIENT SURGERY
Discharge: HOME | End: 2021-07-28
Attending: INTERNAL MEDICINE | Admitting: INTERNAL MEDICINE
Payer: COMMERCIAL

## 2021-07-28 ENCOUNTER — ANESTHESIA EVENT (OUTPATIENT)
Dept: GASTROENTEROLOGY | Facility: MEDICAL CENTER | Age: 81
End: 2021-07-28

## 2021-07-28 ENCOUNTER — ANESTHESIA (OUTPATIENT)
Dept: GASTROENTEROLOGY | Facility: MEDICAL CENTER | Age: 81
End: 2021-07-28

## 2021-07-28 VITALS
BODY MASS INDEX: 31.83 KG/M2 | SYSTOLIC BLOOD PRESSURE: 109 MMHG | WEIGHT: 173 LBS | HEART RATE: 71 BPM | TEMPERATURE: 98.8 F | RESPIRATION RATE: 16 BRPM | DIASTOLIC BLOOD PRESSURE: 52 MMHG | OXYGEN SATURATION: 98 % | HEIGHT: 62 IN

## 2021-07-28 DIAGNOSIS — Z12.11 SPECIAL SCREENING FOR MALIGNANT NEOPLASMS, COLON: ICD-10-CM

## 2021-07-28 PROCEDURE — 88305 TISSUE EXAM BY PATHOLOGIST: CPT | Performed by: PATHOLOGY

## 2021-07-28 PROCEDURE — 45380 COLONOSCOPY AND BIOPSY: CPT | Performed by: INTERNAL MEDICINE

## 2021-07-28 RX ORDER — LIDOCAINE HYDROCHLORIDE 20 MG/ML
INJECTION, SOLUTION EPIDURAL; INFILTRATION; INTRACAUDAL; PERINEURAL AS NEEDED
Status: DISCONTINUED | OUTPATIENT
Start: 2021-07-28 | End: 2021-07-28

## 2021-07-28 RX ORDER — PROPOFOL 10 MG/ML
INJECTION, EMULSION INTRAVENOUS AS NEEDED
Status: DISCONTINUED | OUTPATIENT
Start: 2021-07-28 | End: 2021-07-28

## 2021-07-28 RX ORDER — SODIUM CHLORIDE 9 MG/ML
125 INJECTION, SOLUTION INTRAVENOUS CONTINUOUS
Status: DISCONTINUED | OUTPATIENT
Start: 2021-07-28 | End: 2021-08-01 | Stop reason: HOSPADM

## 2021-07-28 RX ADMIN — PROPOFOL 20 MG: 10 INJECTION, EMULSION INTRAVENOUS at 09:34

## 2021-07-28 RX ADMIN — PROPOFOL 20 MG: 10 INJECTION, EMULSION INTRAVENOUS at 09:43

## 2021-07-28 RX ADMIN — PROPOFOL 30 MG: 10 INJECTION, EMULSION INTRAVENOUS at 09:36

## 2021-07-28 RX ADMIN — PROPOFOL 20 MG: 10 INJECTION, EMULSION INTRAVENOUS at 09:46

## 2021-07-28 RX ADMIN — PROPOFOL 20 MG: 10 INJECTION, EMULSION INTRAVENOUS at 09:50

## 2021-07-28 RX ADMIN — Medication 40 MG: at 09:34

## 2021-07-28 RX ADMIN — PROPOFOL 20 MG: 10 INJECTION, EMULSION INTRAVENOUS at 09:41

## 2021-07-28 RX ADMIN — PROPOFOL 20 MG: 10 INJECTION, EMULSION INTRAVENOUS at 09:37

## 2021-07-28 RX ADMIN — PROPOFOL 90 MG: 10 INJECTION, EMULSION INTRAVENOUS at 09:30

## 2021-07-28 RX ADMIN — LIDOCAINE HYDROCHLORIDE 100 MG: 20 INJECTION, SOLUTION EPIDURAL; INFILTRATION; INTRACAUDAL; PERINEURAL at 09:30

## 2021-07-28 RX ADMIN — PROPOFOL 20 MG: 10 INJECTION, EMULSION INTRAVENOUS at 09:39

## 2021-07-28 RX ADMIN — SODIUM CHLORIDE 125 ML/HR: 0.9 INJECTION, SOLUTION INTRAVENOUS at 08:47

## 2021-07-28 RX ADMIN — PROPOFOL 20 MG: 10 INJECTION, EMULSION INTRAVENOUS at 09:32

## 2021-07-28 RX ADMIN — PROPOFOL 20 MG: 10 INJECTION, EMULSION INTRAVENOUS at 09:48

## 2021-07-28 NOTE — DISCHARGE INSTRUCTIONS
Colonoscopy   WHAT YOU NEED TO KNOW:   A colonoscopy is a procedure to examine the inside of your colon (intestine) with a scope  Polyps or tissue growths may have been removed during your colonoscopy  It is normal to feel bloated and to have some abdominal discomfort  You should be passing gas  If you have hemorrhoids or you had polyps removed, you may have a small amount of bleeding  DISCHARGE INSTRUCTIONS:   Seek care immediately if:    You have sudden, severe abdominal pain   You have problems swallowing   You have a large amount of black, sticky bowel movements or blood in your bowel movements   You have sudden trouble breathing   You feel weak, lightheaded, or faint or your heart beats faster than normal for you  Contact your healthcare provider if:    You have a fever and chills   You have nausea or are vomiting   Your abdomen is bloated or feels full and hard   You have abdominal pain   You have black, sticky bowel movements or blood in your bowel movements   You have not had a bowel movement for 3 days after your procedure   You have rash or hives   You have questions or concerns about your procedure  Activity:    Do not lift, strain, or run for 24 hours after your procedure   Rest after your procedure  You have been given medicine to relax you  Do not drive or make important decisions until the day after your procedure  Return to your normal activity as directed   Relieve gas and discomfort from bloating by lying on your right side with a heating pad on your abdomen  You may need to take short walks to help the gas move out  Eat small meals until bloating is relieved  Follow up with your healthcare provider as directed: Write down your questions so you remember to ask them during your visits  If you take a blood thinner, please review the specific instructions from your endoscopist about when you should resume it   These can be found in the Recommendation and Your Medication list sections of this After Visit Summary  Colorectal Polyps   WHAT YOU NEED TO KNOW:   What are colorectal polyps? Colorectal polyps are small growths of tissue in the lining of the colon and rectum  Most polyps are usually benign (not cancer)  Certain types of polyps, called adenomatous polyps, may turn into cancer  What increases my risk for colorectal polyps? The exact cause of colorectal polyps is unknown  The following may increase your risk:  · Older age    · Foods high in fat and low in fiber    · Family history of polyps    · Intestinal diseases, such as Crohn disease or ulcerative colitis    · Smoking cigarettes or drinking alcohol    · Lack of physical activity, such as exercise    · Obesity    What are the signs and symptoms of colorectal polyps? · Blood in your bowel movement or bleeding from the rectum    · Change in bowel movement habits, such as diarrhea or constipation    · Abdominal pain    How are colorectal polyps diagnosed? You should have fecal blood screening 1 time each year for colorectal disease if you are older than 50 years  You should be screened earlier if you have an intestinal disease or a family history of polyps or colorectal cancer  During this screening, a sample of your bowel movement is checked for blood, which may be an early sign of colorectal polyps or cancer  You may also need any of the following tests:  · A digital rectal exam  means your healthcare provider will use a finger to check for polyps  · A barium enema  is an x-ray of the colon  A tube is put into your anus, and a liquid called barium is put through the tube  Barium is used so that healthcare providers can see your colon better on the x-ray film  · A virtual colonoscopy  is a CT scan that takes pictures of the inside of your colon and rectum   A small, flexible tube is put into your rectum and air or carbon dioxide (gas) is used to expand your colon  This lets healthcare providers clearly see your colon and any polyps on a monitor  · Colonoscopy or sigmoidoscopy  are procedures to help your healthcare provider see the inside of your colon  He or she will use a flexible tube with a small light and camera on the end  During a sigmoidoscopy, your healthcare provider will only look at rectum and lower colon  During a colonoscopy, he or she will look at the full length of your colon  A small amount of tissue may be removed from your colon for tests  How are colorectal polyps treated? Small, benign polyps may not need treatment  Your healthcare provider will check the polyp over time to make sure it is not changing  Polyps that are cancer may be removed with one of the following:  · A polypectomy  is a minimally invasive procedure to remove a polyp during a colonoscopy or sigmoidoscopy  Your healthcare provider may need to remove the polyp with a laparoscope  Laparoscopy is done by inserting a small, flexible scope into incisions made on your abdomen  · Surgery  may be needed to remove large or deep polyps that cannot be removed in a polypectomy  Tissues or lymph nodes near a polyp may also be removed  This helps stop cancer from spreading  What can I do to lower my risk for colorectal polyps? · Eat a variety of healthy foods  Healthy foods include fruit, vegetables, whole-grain breads, low-fat dairy products, beans, lean meat, and fish  Ask if you need to be on a special diet  · Maintain a healthy weight  Ask your healthcare provider what a healthy weight is for you  Ask him or her to help with a weight loss plan if you are overweight  · Exercise as directed  Begin to exercise slowly and do more as you get stronger  Talk with your healthcare provider before you start an exercise program          · Limit alcohol  Your risk for polyps increases the more you drink  · Do not smoke    Nicotine and other chemicals in cigarettes and cigars increase your risk for polyps  Ask your healthcare provider for information if you currently smoke and need help to quit  E-cigarettes or smokeless tobacco still contain nicotine  Talk to your healthcare provider before you use these products  Where can I find more information? · [address] ([facility])  [address] [place], [place] [address]  Phone: [phone]  Web Address: Rosana Eden  [facility] gov    Call your local emergency number (911 in the [address]) if:   · You have sudden shortness of breath  · You have a fast heart rate, fast breathing, or are too dizzy to stand up  When should I seek immediate care? · You have severe abdominal pain  · You see blood in your bowel movement  When should I call my doctor? · You have a fever  · You have chills, a cough, or feel weak and achy  · You have abdominal pain that does not go away or gets worse after you take medicine  · Your abdomen is swollen  · You are losing weight without trying  · You have questions or concerns about your condition or care  CARE AGREEMENT:   You have the right to help plan your care  Learn about your health condition and how it may be treated  Discuss treatment options with your healthcare providers to decide what care you want to receive  You always have the right to refuse treatment  The above information is an  only  It is not intended as medical advice for individual conditions or treatments  Talk to your doctor, nurse or pharmacist before following any medical regimen to see if it is safe and effective for you  © Copyright [employer] 2021 Information is for End User's use only and may not be sold, redistributed or otherwise used for commercial purposes   All illustrations and images included in CareNotes® are the copyrighted property of A D A M , Inc  or [facility] [employer]

## 2021-07-28 NOTE — H&P
History and Physical -  [facility]  Le Andrew [de-identified] y o  female MRN: [id number]    HPI: Le Andrew is a [de-identified]y o  year old female who presents with h/o colonoscopy 10 years ago and now for colon cancer screening  Review of Systems    Historical Information   Past Medical History:   Diagnosis Date    Diabetes mellitus ([address] )     Hemorrhoids     Hyperlipidemia     Hypertension      Past Surgical History:   Procedure Laterality Date    BREAST BIOPSY      Bx breast percutan needle core use image guide (stereotactic)    CATARACT EXTRACTION      COLONOSCOPY      HEMORROIDECTOMY      TOTAL ABDOMINAL HYSTERECTOMY       Social History   Social History     Substance and Sexual Activity   Alcohol Use Yes    Comment: rarely     Social History     Substance and Sexual Activity   Drug Use Never     Social History     Tobacco Use   Smoking Status Never Smoker   Smokeless Tobacco Never Used     Family History   Problem Relation Age of Onset    Heart disease Mother     Lung cancer Mother     Uterine cancer Mother     Heart disease Father    Tanvi Powers Breast cancer Sister     Diabetes Maternal Grandmother        Meds/Allergies     (Not in a hospital admission)      Allergies   Allergen Reactions    Iodinated Diagnostic Agents Hives       Objective     BP (!) 197/86   Pulse 99   Temp 98 8 °F (37 1 °C) (Temporal)   Resp 18   Ht 5' 2" (1 575 m)   Wt 78 5 kg (173 lb)   SpO2 99%   BMI 31 64 kg/m²       PHYSICAL EXAM    Gen: NAD  CV: RRR  CHEST: Clear  ABD: soft, NT/ND  EXT: no edema  Neuro: AAO      ASSESSMENT/PLAN:  This is a [de-identified]y o  year old female here for colonoscopy for colon cancer screening  PLAN:   Procedure: colonoscopy

## 2021-07-28 NOTE — ANESTHESIA PREPROCEDURE EVALUATION
Procedure:  COLONOSCOPY    Relevant Problems   CARDIO   (+) Essential hypertension   (+) Mixed hyperlipidemia   (+) Murmur, cardiac      ENDO   (+) Controlled type 2 diabetes mellitus without complication, without long-term current use of insulin (HCC)      MUSCULOSKELETAL   (+) Primary osteoarthritis of right knee        Physical Exam    Airway    Mallampati score: III  TM Distance: >3 FB  Neck ROM: full     Dental       Cardiovascular  Rhythm: regular, Rate: normal,     Pulmonary  Breath sounds clear to auscultation,     Other Findings        Anesthesia Plan  ASA Score- 2     Anesthesia Type- IV sedation with anesthesia with ASA Monitors  Additional Monitors:   Airway Plan:           Plan Factors-Exercise tolerance (METS): >4 METS  Chart reviewed  Existing labs reviewed  Induction- intravenous  Postoperative Plan-     Informed Consent- Anesthetic plan and risks discussed with patient

## 2021-12-17 ENCOUNTER — RA CDI HCC (OUTPATIENT)
Dept: OTHER | Facility: HOSPITAL | Age: 81
End: 2021-12-17

## 2021-12-22 ENCOUNTER — OFFICE VISIT (OUTPATIENT)
Dept: FAMILY MEDICINE CLINIC | Facility: CLINIC | Age: 81
End: 2021-12-22
Payer: COMMERCIAL

## 2021-12-22 VITALS
WEIGHT: 170 LBS | DIASTOLIC BLOOD PRESSURE: 68 MMHG | SYSTOLIC BLOOD PRESSURE: 148 MMHG | OXYGEN SATURATION: 98 % | BODY MASS INDEX: 31.28 KG/M2 | HEIGHT: 62 IN | HEART RATE: 78 BPM

## 2021-12-22 DIAGNOSIS — E11.319 TYPE 2 DIABETES MELLITUS WITH RETINOPATHY, WITHOUT LONG-TERM CURRENT USE OF INSULIN, MACULAR EDEMA PRESENCE UNSPECIFIED, UNSPECIFIED LATERALITY, UNSPECIFIED RETINOPATHY SEVERITY (HCC): ICD-10-CM

## 2021-12-22 DIAGNOSIS — E78.2 MIXED HYPERLIPIDEMIA: ICD-10-CM

## 2021-12-22 DIAGNOSIS — M85.80 OSTEOPENIA, UNSPECIFIED LOCATION: ICD-10-CM

## 2021-12-22 DIAGNOSIS — E55.9 VITAMIN D DEFICIENCY: ICD-10-CM

## 2021-12-22 DIAGNOSIS — I10 ESSENTIAL HYPERTENSION: ICD-10-CM

## 2021-12-22 DIAGNOSIS — E83.52 HYPERCALCEMIA: ICD-10-CM

## 2021-12-22 DIAGNOSIS — E11.9 CONTROLLED TYPE 2 DIABETES MELLITUS WITHOUT COMPLICATION, WITHOUT LONG-TERM CURRENT USE OF INSULIN (HCC): Primary | ICD-10-CM

## 2021-12-22 PROCEDURE — 3078F DIAST BP <80 MM HG: CPT | Performed by: PHYSICIAN ASSISTANT

## 2021-12-22 PROCEDURE — 99214 OFFICE O/P EST MOD 30 MIN: CPT | Performed by: PHYSICIAN ASSISTANT

## 2021-12-22 PROCEDURE — 1160F RVW MEDS BY RX/DR IN RCRD: CPT | Performed by: PHYSICIAN ASSISTANT

## 2021-12-22 PROCEDURE — 1036F TOBACCO NON-USER: CPT | Performed by: PHYSICIAN ASSISTANT

## 2021-12-22 PROCEDURE — 3077F SYST BP >= 140 MM HG: CPT | Performed by: PHYSICIAN ASSISTANT

## 2022-01-30 DIAGNOSIS — Z01.818 PREOP GENERAL PHYSICAL EXAM: ICD-10-CM

## 2022-01-30 DIAGNOSIS — Z01.818 PREOP TESTING: ICD-10-CM

## 2022-01-30 DIAGNOSIS — I10 ESSENTIAL HYPERTENSION: ICD-10-CM

## 2022-02-01 RX ORDER — HYDROCHLOROTHIAZIDE 50 MG/1
TABLET ORAL
Qty: 90 TABLET | Refills: 3 | Status: SHIPPED | OUTPATIENT
Start: 2022-02-01

## 2022-02-27 DIAGNOSIS — E78.2 MIXED HYPERLIPIDEMIA: ICD-10-CM

## 2022-02-28 RX ORDER — PRAVASTATIN SODIUM 10 MG
TABLET ORAL
Qty: 90 TABLET | Refills: 1 | Status: SHIPPED | OUTPATIENT
Start: 2022-02-28

## 2022-03-19 ENCOUNTER — TELEPHONE (OUTPATIENT)
Dept: FAMILY MEDICINE CLINIC | Facility: CLINIC | Age: 82
End: 2022-03-19

## 2022-03-19 DIAGNOSIS — Z12.31 ENCOUNTER FOR SCREENING MAMMOGRAM FOR MALIGNANT NEOPLASM OF BREAST: Primary | ICD-10-CM

## 2022-03-19 NOTE — TELEPHONE ENCOUNTER
Patient is going to have a mammo on Wednesday March 23rd and needs a order for it  She is going to [facility] so she will need to pick it up   Please call when ready for

## 2022-03-21 ENCOUNTER — TELEPHONE (OUTPATIENT)
Dept: FAMILY MEDICINE CLINIC | Facility: CLINIC | Age: 82
End: 2022-03-21

## 2022-03-21 NOTE — TELEPHONE ENCOUNTER
Patient  called stating pt has a scheduled mammo 03/23 needs a script, going to [facility], if they can call when provider puts in order so patient can

## 2022-03-25 ENCOUNTER — TELEPHONE (OUTPATIENT)
Dept: ADMINISTRATIVE | Facility: OTHER | Age: 82
End: 2022-03-25

## 2022-03-25 NOTE — TELEPHONE ENCOUNTER
Upon review of the In Basket request we were able to locate, review, and update the patient chart as requested for Mammogram     Any additional questions or concerns should be emailed to the Practice Liaisons via [email or web address] email, please do not reply via In Basket      Thank you  Davin You

## 2022-03-25 NOTE — TELEPHONE ENCOUNTER
----- Message from [address] sent at 3/24/2022  1:12 PM EDT -----  Regarding: care gap request  03/24/22 1:12 PM    Hello, our patient attached above has had Mammogram completed/performed  Please assist in updating the patient chart by pulling the Care Everywhere (CE) document  The date of service is 3/24/2022       Thank you,  Shanita Garcia  [facility]

## 2022-05-26 DIAGNOSIS — E11.9 TYPE 2 DIABETES MELLITUS WITHOUT COMPLICATION, WITHOUT LONG-TERM CURRENT USE OF INSULIN (HCC): ICD-10-CM

## 2022-06-21 LAB — HBA1C MFR BLD HPLC: 5.8 %

## 2022-06-27 ENCOUNTER — RA CDI HCC (OUTPATIENT)
Dept: OTHER | Facility: HOSPITAL | Age: 82
End: 2022-06-27

## 2022-06-27 NOTE — PROGRESS NOTES
[address]  coding opportunities     [address]     Chart Reviewed number of suggestions sent to Provider: 1     Patients Insurance     Medicare Insurance: [address] [place] [address]

## 2022-07-01 ENCOUNTER — OFFICE VISIT (OUTPATIENT)
Dept: FAMILY MEDICINE CLINIC | Facility: CLINIC | Age: 82
End: 2022-07-01
Payer: COMMERCIAL

## 2022-07-01 ENCOUNTER — TELEPHONE (OUTPATIENT)
Dept: ADMINISTRATIVE | Facility: OTHER | Age: 82
End: 2022-07-01

## 2022-07-01 VITALS
HEIGHT: 62 IN | WEIGHT: 172 LBS | OXYGEN SATURATION: 96 % | SYSTOLIC BLOOD PRESSURE: 132 MMHG | BODY MASS INDEX: 31.65 KG/M2 | HEART RATE: 87 BPM | DIASTOLIC BLOOD PRESSURE: 60 MMHG

## 2022-07-01 DIAGNOSIS — E55.9 VITAMIN D DEFICIENCY: ICD-10-CM

## 2022-07-01 DIAGNOSIS — Z00.00 MEDICARE ANNUAL WELLNESS VISIT, SUBSEQUENT: ICD-10-CM

## 2022-07-01 DIAGNOSIS — I10 ESSENTIAL HYPERTENSION: ICD-10-CM

## 2022-07-01 DIAGNOSIS — E11.9 CONTROLLED TYPE 2 DIABETES MELLITUS WITHOUT COMPLICATION, WITHOUT LONG-TERM CURRENT USE OF INSULIN (HCC): Primary | ICD-10-CM

## 2022-07-01 DIAGNOSIS — E78.2 MIXED HYPERLIPIDEMIA: ICD-10-CM

## 2022-07-01 DIAGNOSIS — M85.80 OSTEOPENIA, UNSPECIFIED LOCATION: ICD-10-CM

## 2022-07-01 DIAGNOSIS — E11.319 TYPE 2 DIABETES MELLITUS WITH RETINOPATHY, WITHOUT LONG-TERM CURRENT USE OF INSULIN, MACULAR EDEMA PRESENCE UNSPECIFIED, UNSPECIFIED LATERALITY, UNSPECIFIED RETINOPATHY SEVERITY (HCC): ICD-10-CM

## 2022-07-01 DIAGNOSIS — K51.40 PSEUDOPOLYPOSIS OF COLON WITHOUT COMPLICATION, UNSPECIFIED PART OF COLON (HCC): ICD-10-CM

## 2022-07-01 LAB
CREAT UR-MCNC: 95.1 MG/DL
MICROALBUMIN UR-MCNC: 5.6 MG/L (ref 0–20)
MICROALBUMIN/CREAT 24H UR: 6 MG/G CREATININE (ref 0–30)

## 2022-07-01 PROCEDURE — 82043 UR ALBUMIN QUANTITATIVE: CPT | Performed by: PHYSICIAN ASSISTANT

## 2022-07-01 PROCEDURE — G0439 PPPS, SUBSEQ VISIT: HCPCS | Performed by: PHYSICIAN ASSISTANT

## 2022-07-01 PROCEDURE — 3725F SCREEN DEPRESSION PERFORMED: CPT | Performed by: PHYSICIAN ASSISTANT

## 2022-07-01 PROCEDURE — 1101F PT FALLS ASSESS-DOCD LE1/YR: CPT | Performed by: PHYSICIAN ASSISTANT

## 2022-07-01 PROCEDURE — 99214 OFFICE O/P EST MOD 30 MIN: CPT | Performed by: PHYSICIAN ASSISTANT

## 2022-07-01 PROCEDURE — 82570 ASSAY OF URINE CREATININE: CPT | Performed by: PHYSICIAN ASSISTANT

## 2022-07-01 PROCEDURE — 1170F FXNL STATUS ASSESSED: CPT | Performed by: PHYSICIAN ASSISTANT

## 2022-07-01 PROCEDURE — 1160F RVW MEDS BY RX/DR IN RCRD: CPT | Performed by: PHYSICIAN ASSISTANT

## 2022-07-01 PROCEDURE — 1125F AMNT PAIN NOTED PAIN PRSNT: CPT | Performed by: PHYSICIAN ASSISTANT

## 2022-07-01 PROCEDURE — 3288F FALL RISK ASSESSMENT DOCD: CPT | Performed by: PHYSICIAN ASSISTANT

## 2022-07-01 PROCEDURE — 3078F DIAST BP <80 MM HG: CPT | Performed by: PHYSICIAN ASSISTANT

## 2022-07-01 PROCEDURE — 3075F SYST BP GE 130 - 139MM HG: CPT | Performed by: PHYSICIAN ASSISTANT

## 2022-07-01 NOTE — TELEPHONE ENCOUNTER
Upon review of the In Basket request we were able to locate, review, and update the patient chart as requested for Diabetic Foot Exam     Any additional questions or concerns should be emailed to the Practice Liaisons via [email or web address] email, please do not reply via In Basket      Thank you  Alyssa Rai MA Island Pedicle Flap-Requiring Vessel Identification Text: The defect edges were debeveled with a #15 scalpel blade.  Given the location of the defect, shape of the defect and the proximity to free margins an island pedicle advancement flap was deemed most appropriate.  Using a sterile surgical marker, an appropriate advancement flap was drawn, based on the axial vessel mentioned above, incorporating the defect, outlining the appropriate donor tissue and placing the expected incisions within the relaxed skin tension lines where possible.    The area thus outlined was incised deep to adipose tissue with a #15 scalpel blade.  The skin margins were undermined to an appropriate distance in all directions around the primary defect and laterally outward around the island pedicle utilizing iris scissors.  There was minimal undermining beneath the pedicle flap.

## 2022-07-01 NOTE — ASSESSMENT & PLAN NOTE
Patient's A1c is now 5 8 I believe we can stop the Glucophage 500 mg once daily and continue to observe  Recheck 6 months  Risk of hypoglycemic events outweighs risk of A1c elevation especially an 81year-old when goal is below 8     Lab Results   Component Value Date    HGBA1C 5 8 (H) 06/21/2022

## 2022-07-01 NOTE — PROGRESS NOTES
Assessment and Plan:     Problem List Items Addressed This Visit        Digestive    Pseudopolyposis of colon without complication, unspecified part of colon ([facility] )     PT was told she no longer needs colonoscopies  Endocrine    Controlled type 2 diabetes mellitus without complication, without long-term current use of insulin ([facility] ) - Primary     Patient's A1c is now 5 8 I believe we can stop the Glucophage 500 mg once daily and continue to observe  Recheck 6 months  Risk of hypoglycemic events outweighs risk of A1c elevation especially an 81year-old when goal is below 8  Lab Results   Component Value Date    HGBA1C 5 8 (H) 06/21/2022              Relevant Orders    Microalbumin / creatinine urine ratio    Comprehensive metabolic panel    Hemoglobin A1C    Type 2 diabetes mellitus with ophthalmic manifestations ([facility] )     Continue the Ophthalmology  Lab Results   Component Value Date    HGBA1C 5 8 (H) 06/21/2022                 Cardiovascular and Mediastinum    Essential hypertension     Stable continue current medication  Musculoskeletal and Integument    Osteopenia     DEXA due 12/2022  Other    Mixed hyperlipidemia     Patient on Pravachol 10 mg once daily keeping her LDL at goal at 64  Continue recheck 6 months  Relevant Orders    Lipid Panel with Direct LDL reflex    Vitamin D deficiency     Check vitamin-D with next labs  Continue supplementation  Relevant Orders    Vitamin D 25 hydroxy    Medicare annual wellness visit, subsequent     Patient has living will on file  Blood work up-to-date  Shingles pneumonia COVID up-to-date  BMI Counseling: Body mass index is 31 46 kg/m²   The BMI is above normal  Nutrition recommendations include decreasing portion sizes, encouraging healthy choices of fruits and vegetables, decreasing fast food intake, consuming healthier snacks, limiting drinks that contain sugar, moderation in carbohydrate intake, increasing intake of lean protein, reducing intake of saturated and trans fat and reducing intake of cholesterol  Exercise recommendations include exercising 3-5 times per week  No pharmacotherapy was ordered  Rationale for BMI follow-up plan is due to patient being overweight or obese  Depression Screening and Follow-up Plan: Patient was screened for depression during today's encounter  They screened negative with a PHQ-2 score of 0  Preventive health issues were discussed with patient, and age appropriate screening tests were ordered as noted in patient's After Visit Summary  Personalized health advice and appropriate referrals for health education or preventive services given if needed, as noted in patient's After Visit Summary  History of Present Illness:     Patient presents for a Medicare Wellness Visit      Franchesca Gallardo is here for chronic conditions f/u including the diagnosis of Controlled type 2 diabetes mellitus without complication, without long-term current use of insulin (hcc)  (primary encounter diagnosis)   Pt  states they are taking all medications as directed without complaints or side effects   Pt  had labs done prior to today's visit which included Recent Results (from the past 672 hour(s))  -Hemoglobin A1C:   Collection Time: 06/21/22 12:00 AM       Result                      Value             Ref Range           Hemoglobin A1C              5 8                              -HEMOGLOBIN A1C:   Collection Time: 06/21/22  8:26 AM       Result                      Value             Ref Range           Hemoglobin A1C              5 8 (H)           <5 7 %              eAG, EST AVG Glucose        120               <154 mg/dL          Patient Care Team:  Rebekah Valle PA-C as PCP - General (Family Medicine)  Efra Pinto MD (Inactive) (Gynecology)  Kendall Segal DPM (Podiatry)  Santa Masters MD (Ophthalmology)     Review of Systems:     Review of Systems   Constitutional: Negative  HENT: Negative  Eyes: Negative  Respiratory: Negative  Cardiovascular: Negative  Gastrointestinal: Negative  Endocrine: Negative  Genitourinary: Negative  Musculoskeletal: Negative  Skin: Negative  Allergic/Immunologic: Negative  Neurological: Negative  Hematological: Negative  Psychiatric/Behavioral: Negative           Problem List:     Patient Active Problem List   Diagnosis    Allergic rhinitis    Alopecia    Mixed hyperlipidemia    Essential hypertension    Osteopenia    Soft tissue neoplasm    Synovial cyst    Vitamin D deficiency    Chronic fatigue    Controlled type 2 diabetes mellitus without complication, without long-term current use of insulin (HCC)    Primary osteoarthritis of right knee    Atrophic vaginitis    Murmur, cardiac    Medicare annual wellness visit, subsequent    Gross hematuria    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    Type 2 diabetes mellitus with ophthalmic manifestations (HCC)    Hypercalcemia    Pseudopolyposis of colon without complication, unspecified part of colon ([facility])      Past Medical and Surgical History:     Past Medical History:   Diagnosis Date    Diabetes mellitus ([facility] )     Hemorrhoids     Hyperlipidemia     Hypertension     Pseudopolyposis of colon without complication, [facility] 7/1/2022     Past Surgical History:   Procedure Laterality Date    BREAST BIOPSY      Bx breast percutan needle core use image guide (stereotactic)    CATARACT EXTRACTION      COLONOSCOPY      HEMORROIDECTOMY      TOTAL ABDOMINAL HYSTERECTOMY        Family History:     Family History   Problem Relation Age of Onset    Heart disease Mother     Lung cancer Mother     Uterine cancer Mother     Heart disease Father     Breast cancer Sister     Diabetes Maternal Grandmother       Social History:     Social History     Socioeconomic History    Marital status: /Civil Union Spouse name: None    Number of children: None    Years of education: None    Highest education level: None   Occupational History    None   Tobacco Use    Smoking status: Never Smoker    Smokeless tobacco: Never Used   Substance and Sexual Activity    Alcohol use: Yes     Comment: rarely    Drug use: Never    Sexual activity: None   Other Topics Concern    None   Social History Narrative        Retired     Social Determinants of Health     Financial Resource Strain: Not on file   Food Insecurity: Not on file   Transportation Needs: Not on file   Physical Activity: Not on file   Stress: Not on file   Social Connections: Not on file   Intimate Partner Violence: Not on file   Housing Stability: Not on file      Medications and Allergies:     Current Outpatient Medications   Medication Sig Dispense Refill    Biotin 85426 MCG TABS Take by mouth        Cholecalciferol (VITAMIN D) 2000 units CAPS Take 1 capsule by mouth 2 (two) times a day      clobetasol (TEMOVATE) 0 05 % ointment APPLY TO AFFECTED AREA TWICE A DAY (Patient taking differently: No sig reported) 30 g 1    hydrochlorothiazide (HYDRODIURIL) 50 mg tablet TAKE 1 TABLET BY MOUTH EVERY DAY 90 tablet 3    metFORMIN (GLUCOPHAGE) 500 mg tablet TAKE 1 TABLET BY MOUTH EVERY DAY AS DIRECTED 90 tablet 3    pravastatin (PRAVACHOL) 10 mg tablet TAKE 1 TABLET BY MOUTH EVERYDAY AT BEDTIME 90 tablet 1    diclofenac sodium (VOLTAREN) 1 % Apply 2 g topically 4 (four) times a day (Patient not taking: Reported on 7/1/2022) 1 Tube 3     No current facility-administered medications for this visit       Allergies   Allergen Reactions    Iodinated Diagnostic Agents Hives      Immunizations:     Immunization History   Administered Date(s) Administered    COVID-19 MODERNA VACC 0 5 ML IM 01/11/2021, 02/08/2021, 10/27/2021, 04/04/2022    INFLUENZA 09/28/2020    Influenza Split High Dose Preservative Free IM 10/19/2015, 10/21/2016, 10/31/2017    Influenza, high dose seasonal 0 7 mL 09/21/2018, 11/05/2019, 08/20/2021    Influenza, seasonal, injectable 11/06/2010    Pneumococcal Conjugate 13-Valent 04/05/2019    Pneumococcal Polysaccharide PPV23 02/18/2011    Zoster 07/21/2012    Zoster Vaccine Recombinant 07/01/2020, 09/28/2020      Health Maintenance:         Topic Date Due    Breast Cancer Screening: Mammogram  03/23/2023         Topic Date Due    DTaP,Tdap,and Td Vaccines (1 - Tdap) Never done    Influenza Vaccine (1) 09/01/2022      Medicare Screening Tests and Risk Assessments:     Martha Lowry is here for her Subsequent Wellness visit  Health Risk Assessment:   Patient rates overall health as very good  Patient feels that their physical health rating is slightly better  Patient is very satisfied with their life  Eyesight was rated as same  Hearing was rated as same  Patient feels that their emotional and mental health rating is much better  Patients states they are never, rarely angry  Patient states they are never, rarely unusually tired/fatigued  Pain experienced in the last 7 days has been none  Patient states that she has experienced no weight loss or gain in last 6 months  Depression Screening:   PHQ-2 Score: 0      Fall Risk Screening: In the past year, patient has experienced: no history of falling in past year      Urinary Incontinence Screening:   Patient has leaked urine accidently in the last six months  slightly    Home Safety:  Patient has trouble with stairs inside or outside of their home  Patient has working smoke alarms and has working carbon monoxide detector  Home safety hazards include: none  Trouble with knees    Nutrition:   Current diet is Diabetic and No Added Salt  Medications:   Patient is currently taking over-the-counter supplements  OTC medications include: see medication list  Patient is able to manage medications       Activities of Daily Living (ADLs)/Instrumental Activities of Daily Living (IADLs):   Walk and transfer into and out of bed and chair?: Yes  Dress and groom yourself?: Yes    Bathe or shower yourself?: Yes    Feed yourself? Yes  Do your laundry/housekeeping?: Yes  Manage your money, pay your bills and track your expenses?: Yes  Make your own meals?: Yes    Do your own shopping?: Yes    Previous Hospitalizations:   Any hospitalizations or ED visits within the last 12 months?: No      Advance Care Planning:     Advanced directive counseling given: Yes    Five wishes given: Yes      Cognitive Screening:   Provider or family/friend/caregiver concerned regarding cognition?: No    PREVENTIVE SCREENINGS      Cardiovascular Screening:    General: Screening Not Indicated, History Lipid Disorder and Screening Current      Diabetes Screening:     General: Screening Not Indicated, History Diabetes and Screening Current      Colorectal Cancer Screening:     General: Screening Not Indicated      Breast Cancer Screening:     General: Screening Current      Cervical Cancer Screening:    General: Screening Not Indicated      Osteoporosis Screening:    General: Screening Current      Abdominal Aortic Aneurysm (AAA) Screening:        General: Screening Not Indicated      Lung Cancer Screening:     General: Screening Not Indicated      Hepatitis C Screening:    General: Risks and Benefits Discussed    Screening, Brief Intervention, and Referral to Treatment (SBIRT)    Screening      Single Item Drug Screening:  How often have you used an illegal drug (including marijuana) or a prescription medication for non-medical reasons in the past year? never    Single Item Drug Screen Score: 0  Interpretation: Negative screen for possible drug use disorder    No exam data present     Physical Exam:     /60 (BP Location: Left arm, Patient Position: Sitting, Cuff Size: Large)   Pulse 87   Ht 5' 2" (1 575 m)   Wt 78 kg (172 lb)   SpO2 96%   BMI 31 46 kg/m²     Physical Exam  Vitals and nursing note reviewed     Constitutional: Appearance: Normal appearance  She is well-developed  HENT:      Head: Normocephalic and atraumatic  Eyes:      General: Lids are normal       Conjunctiva/sclera: Conjunctivae normal       Pupils: Pupils are equal, round, and reactive to light  Cardiovascular:      Rate and Rhythm: Normal rate and regular rhythm  Heart sounds: Murmur heard  Systolic murmur is present with a grade of 3/6  Pulmonary:      Effort: Pulmonary effort is normal       Breath sounds: Normal breath sounds  Skin:     General: Skin is warm and dry  Neurological:      General: No focal deficit present  Mental Status: She is alert  Coordination: Coordination is intact  Psychiatric:         Mood and Affect: Mood normal          Behavior: Behavior normal  Behavior is cooperative  Thought Content:  Thought content normal          Judgment: Judgment normal           Ramiro Champagne PA-C

## 2022-07-01 NOTE — TELEPHONE ENCOUNTER
----- Message from [place], [place] sent at 7/1/2022  7:58 AM EDT -----  Regarding: DIABETIC FOOT EXAM  07/01/22 7:58 AM    José Luis, our patient Jacqueline Olsen has had Diabetic Foot Exam completed/performed  Please assist in updating the patient chart by pulling the document from the Media Tab  The date of service is 2/9/2022       Thank you,  [facility], MA  [facility] PRIMARY [facility]ARE

## 2022-07-01 NOTE — PATIENT INSTRUCTIONS
Problem List Items Addressed This Visit          Endocrine    Controlled type 2 diabetes mellitus without complication, without long-term current use of insulin ([facility] ) - Primary

## 2022-10-12 ENCOUNTER — APPOINTMENT (OUTPATIENT)
Dept: LAB | Facility: MEDICAL CENTER | Age: 82
End: 2022-10-12
Payer: COMMERCIAL

## 2022-10-12 ENCOUNTER — OFFICE VISIT (OUTPATIENT)
Dept: FAMILY MEDICINE CLINIC | Facility: CLINIC | Age: 82
End: 2022-10-12
Payer: COMMERCIAL

## 2022-10-12 VITALS
DIASTOLIC BLOOD PRESSURE: 68 MMHG | OXYGEN SATURATION: 97 % | SYSTOLIC BLOOD PRESSURE: 132 MMHG | HEART RATE: 120 BPM | BODY MASS INDEX: 30.77 KG/M2 | WEIGHT: 167.2 LBS | HEIGHT: 62 IN

## 2022-10-12 DIAGNOSIS — H53.9 VISION CHANGES: ICD-10-CM

## 2022-10-12 DIAGNOSIS — E78.2 MIXED HYPERLIPIDEMIA: ICD-10-CM

## 2022-10-12 DIAGNOSIS — H47.019 ANTERIOR ISCHEMIC OPTIC NEUROPATHY, UNSPECIFIED LATERALITY: ICD-10-CM

## 2022-10-12 DIAGNOSIS — R00.0 TACHYCARDIA: ICD-10-CM

## 2022-10-12 DIAGNOSIS — I10 ESSENTIAL HYPERTENSION: Primary | ICD-10-CM

## 2022-10-12 DIAGNOSIS — I10 ESSENTIAL HYPERTENSION: ICD-10-CM

## 2022-10-12 DIAGNOSIS — E11.319 TYPE 2 DIABETES MELLITUS WITH RETINOPATHY, WITHOUT LONG-TERM CURRENT USE OF INSULIN, MACULAR EDEMA PRESENCE UNSPECIFIED, UNSPECIFIED LATERALITY, UNSPECIFIED RETINOPATHY SEVERITY (HCC): ICD-10-CM

## 2022-10-12 PROBLEM — Z00.00 MEDICARE ANNUAL WELLNESS VISIT, SUBSEQUENT: Status: RESOLVED | Noted: 2021-05-24 | Resolved: 2022-10-12

## 2022-10-12 LAB
ALBUMIN SERPL BCP-MCNC: 3.6 G/DL (ref 3.5–5)
ALP SERPL-CCNC: 52 U/L (ref 46–116)
ALT SERPL W P-5'-P-CCNC: 23 U/L (ref 12–78)
ANION GAP SERPL CALCULATED.3IONS-SCNC: 8 MMOL/L (ref 4–13)
AST SERPL W P-5'-P-CCNC: 12 U/L (ref 5–45)
BASOPHILS # BLD AUTO: 0.04 THOUSANDS/ΜL (ref 0–0.1)
BASOPHILS NFR BLD AUTO: 1 % (ref 0–1)
BILIRUB SERPL-MCNC: 0.64 MG/DL (ref 0.2–1)
BUN SERPL-MCNC: 13 MG/DL (ref 5–25)
CALCIUM SERPL-MCNC: 11.1 MG/DL (ref 8.3–10.1)
CHLORIDE SERPL-SCNC: 96 MMOL/L (ref 96–108)
CO2 SERPL-SCNC: 30 MMOL/L (ref 21–32)
CREAT SERPL-MCNC: 0.96 MG/DL (ref 0.6–1.3)
EOSINOPHIL # BLD AUTO: 0.16 THOUSAND/ΜL (ref 0–0.61)
EOSINOPHIL NFR BLD AUTO: 2 % (ref 0–6)
ERYTHROCYTE [DISTWIDTH] IN BLOOD BY AUTOMATED COUNT: 12.8 % (ref 11.6–15.1)
ERYTHROCYTE [SEDIMENTATION RATE] IN BLOOD: 57 MM/HOUR (ref 0–29)
GFR SERPL CREATININE-BSD FRML MDRD: 55 ML/MIN/1.73SQ M
GLUCOSE SERPL-MCNC: 105 MG/DL (ref 65–140)
HCT VFR BLD AUTO: 39.1 % (ref 34.8–46.1)
HGB BLD-MCNC: 12.9 G/DL (ref 11.5–15.4)
IMM GRANULOCYTES # BLD AUTO: 0.04 THOUSAND/UL (ref 0–0.2)
IMM GRANULOCYTES NFR BLD AUTO: 1 % (ref 0–2)
LYMPHOCYTES # BLD AUTO: 1.52 THOUSANDS/ΜL (ref 0.6–4.47)
LYMPHOCYTES NFR BLD AUTO: 19 % (ref 14–44)
MCH RBC QN AUTO: 28.3 PG (ref 26.8–34.3)
MCHC RBC AUTO-ENTMCNC: 33 G/DL (ref 31.4–37.4)
MCV RBC AUTO: 86 FL (ref 82–98)
MONOCYTES # BLD AUTO: 0.77 THOUSAND/ΜL (ref 0.17–1.22)
MONOCYTES NFR BLD AUTO: 9 % (ref 4–12)
NEUTROPHILS # BLD AUTO: 5.64 THOUSANDS/ΜL (ref 1.85–7.62)
NEUTS SEG NFR BLD AUTO: 68 % (ref 43–75)
NRBC BLD AUTO-RTO: 0 /100 WBCS
PLATELET # BLD AUTO: 403 THOUSANDS/UL (ref 149–390)
PMV BLD AUTO: 10.5 FL (ref 8.9–12.7)
POTASSIUM SERPL-SCNC: 3.6 MMOL/L (ref 3.5–5.3)
PROT SERPL-MCNC: 7.9 G/DL (ref 6.4–8.4)
RBC # BLD AUTO: 4.56 MILLION/UL (ref 3.81–5.12)
SODIUM SERPL-SCNC: 134 MMOL/L (ref 135–147)
TSH SERPL DL<=0.05 MIU/L-ACNC: 0.63 UIU/ML (ref 0.45–4.5)
WBC # BLD AUTO: 8.17 THOUSAND/UL (ref 4.31–10.16)

## 2022-10-12 PROCEDURE — 36415 COLL VENOUS BLD VENIPUNCTURE: CPT

## 2022-10-12 PROCEDURE — 99214 OFFICE O/P EST MOD 30 MIN: CPT | Performed by: FAMILY MEDICINE

## 2022-10-12 PROCEDURE — 85025 COMPLETE CBC W/AUTO DIFF WBC: CPT

## 2022-10-12 PROCEDURE — 80053 COMPREHEN METABOLIC PANEL: CPT

## 2022-10-12 PROCEDURE — 84443 ASSAY THYROID STIM HORMONE: CPT

## 2022-10-12 PROCEDURE — 93000 ELECTROCARDIOGRAM COMPLETE: CPT | Performed by: FAMILY MEDICINE

## 2022-10-12 PROCEDURE — 85652 RBC SED RATE AUTOMATED: CPT

## 2022-10-12 RX ORDER — MULTIVIT WITH MINERALS/LUTEIN
250 TABLET ORAL DAILY
COMMUNITY

## 2022-10-12 NOTE — H&P (VIEW-ONLY)
Name: Juli Graves      : 1940      MRN: [id number]  Encounter Provider: Dorcas Valdez MD  Encounter Date: 10/12/2022   Encounter department: [place] [address]  Essential hypertension  Assessment & Plan:  Blood pressure today was reasonable  Home blood pressures previously have also been reasonable, though was slightly elevated today  Agree with Ophthalmology ordering sed rate for temporal arteritis  Will also check CBC, CMP, TSH  No changes in medications at the moment, as her blood pressure in the office was better  Orders:  -     CBC and differential; Future  -     Comprehensive metabolic panel; Future; Expected date: 10/12/2022  -     TSH, 3rd generation; Future; Expected date: 10/12/2022    2  Mixed hyperlipidemia  Assessment & Plan:  Patient does have hyperlipidemia  She is currently on pravastatin  Last labs in  or quite reasonable  Recheck CMP  She also reports family history of heart disease, therefore would be concerned about vascular changes to the eye, but again she did see Ophthalmology in they would be the ones to evaluate today  Check in the future as scheduled, continue on pravastatin  3  Type 2 diabetes mellitus with retinopathy, without long-term current use of insulin, macular edema presence unspecified, unspecified laterality, unspecified retinopathy severity ([address] )  Assessment & Plan:    Lab Results   Component Value Date    HGBA1C 5 8 (H) 2022   Last A1c was fantastic off medication  4  Vision changes  Assessment & Plan:  Patient has been having some vision changes  Ophthalmology clearly was worried about temporal arteritis based on ordering sed rate  Vision is still somewhat blurry, about the same as before  Again, recheck some labs, but I would not make any adjustments based on blood pressure  Should follow-up with Ophthalmology  Encourage them to get the sed rate done today      Orders:  -     CBC and differential; Future  -     Comprehensive metabolic panel; Future; Expected date: 10/12/2022  -     TSH, 3rd generation; Future; Expected date: 10/12/2022    5  Tachycardia  Comments:  Initial exam showed increased heart rate, but EKG was normal     Orders:  -     CBC and differential; Future  -     Comprehensive metabolic panel; Future; Expected date: 10/12/2022  -     TSH, 3rd generation; Future; Expected date: 10/12/2022  -     POCT ECG           Subjective      Chief Complaint   Patient presents with   • Hypertension     Pt started with a shadow in the left eye so she went to the [facility] and they think there is hemorrhage in the eye  Also pt is having issues with her jaw as well and Dr Kirt Cheney at [facility] said see PCP and have BP checked as this could be a artery issue  kw       Please see chief complaint  Patient did go to the eye doctor today  They had ordered a sed rate for her  This is looking for temporal arteritis  Has been having some issues with chewing and pains  Has also had some issues with vision  She went to ophtho, and he recommended follow here for BP, and to have labs done  Reviewed PMH and active problems  Patient did check her blood pressure  Use a wrist cuff at home  She did notice some elevations at home recently  Prior to this, she would get between 708 and 512 systolic  At the ophthalmology office today, her systolic was 358  She also did get an elevated blood pressure at home this morning      Review of Systems    Current Outpatient Medications on File Prior to Visit   Medication Sig   • ascorbic acid (VITAMIN C) 250 mg tablet Take 250 mg by mouth daily   • Biotin 74847 MCG TABS Take by mouth     • Cholecalciferol (VITAMIN D) 2000 units CAPS Take 1 capsule by mouth 2 (two) times a day   • hydrochlorothiazide (HYDRODIURIL) 50 mg tablet TAKE 1 TABLET BY MOUTH EVERY DAY   • pravastatin (PRAVACHOL) 10 mg tablet TAKE 1 TABLET BY MOUTH EVERYDAY AT BEDTIME   • [DISCONTINUED] clobetasol (TEMOVATE) 0 05 % ointment APPLY TO AFFECTED AREA TWICE A DAY (Patient taking differently: No sig reported)   • [DISCONTINUED] diclofenac sodium (VOLTAREN) 1 % Apply 2 g topically 4 (four) times a day (Patient not taking: No sig reported)   • [DISCONTINUED] metFORMIN (GLUCOPHAGE) 500 mg tablet TAKE 1 TABLET BY MOUTH EVERY DAY AS DIRECTED (Patient not taking: Reported on 10/12/2022)       Objective     /68 (BP Location: Right arm, Patient Position: Sitting)   Pulse (!) 120   Ht 5' 2" (1 575 m)   Wt 75 8 kg (167 lb 3 2 oz)   SpO2 97%   BMI 30 58 kg/m²     Physical Exam  Vitals and nursing note reviewed  Constitutional:       Appearance: She is well-developed  HENT:      Head: Normocephalic and atraumatic  Cardiovascular:      Rate and Rhythm: Normal rate and regular rhythm  Pulses:           Carotid pulses are 2+ on the right side and 2+ on the left side  Heart sounds: Normal heart sounds  Pulmonary:      Effort: Pulmonary effort is normal       Breath sounds: Normal breath sounds  No wheezing or rales  Chest:      Chest wall: No tenderness         Kacey Galvez MD

## 2022-10-12 NOTE — PATIENT INSTRUCTIONS
1  Essential hypertension  Assessment & Plan:  Blood pressure today was reasonable  Home blood pressures previously have also been reasonable, though was slightly elevated today  Agree with Ophthalmology ordering sed rate for temporal arteritis  Will also check CBC, CMP, TSH  No changes in medications at the moment, as her blood pressure in the office was better  Orders:  -     CBC and differential; Future  -     Comprehensive metabolic panel; Future; Expected date: 10/12/2022  -     TSH, 3rd generation; Future; Expected date: 10/12/2022    2  Mixed hyperlipidemia  Assessment & Plan:  Patient does have hyperlipidemia  She is currently on pravastatin  Last labs in June or quite reasonable  Recheck CMP  She also reports family history of heart disease, therefore would be concerned about vascular changes to the eye, but again she did see Ophthalmology in they would be the ones to evaluate today  Check in the future as scheduled, continue on pravastatin  3  Type 2 diabetes mellitus with retinopathy, without long-term current use of insulin, macular edema presence unspecified, unspecified laterality, unspecified retinopathy severity ([address] )  Assessment & Plan:    Lab Results   Component Value Date    HGBA1C 5 8 (H) 06/21/2022   Last A1c was fantastic off medication  4  Vision changes  Assessment & Plan:  Patient has been having some vision changes  Ophthalmology clearly was worried about temporal arteritis based on ordering sed rate  Vision is still somewhat blurry, about the same as before  Again, recheck some labs, but I would not make any adjustments based on blood pressure  Should follow-up with Ophthalmology  Encourage them to get the sed rate done today  Orders:  -     CBC and differential; Future  -     Comprehensive metabolic panel; Future; Expected date: 10/12/2022  -     TSH, 3rd generation; Future; Expected date: 10/12/2022    5   Tachycardia  Comments:  Initial exam showed increased heart rate, but EKG was normal     Orders:  -     CBC and differential; Future  -     Comprehensive metabolic panel; Future; Expected date: 10/12/2022  -     TSH, 3rd generation; Future; Expected date: 10/12/2022  -     POCT ECG      COVID 19 Instructions    Lelo Ramirez was advised to limit contact with others to essential tasks such as getting food, medications, and medical care  Proper handwashing reviewed, and Hand sanitzer when washing is not available  If the patient develops symptoms of COVID 19, the patient should call the office as soon as possible  For [id number] Flu season, it is strongly recommended that Flu Vaccinations be obtained  Virtual Visits:  Anthony: This works on smart phones (any phone with Internet browsing capability)  You should get a text message when the provider is ready to see you  Click on the link in the text message, and the call should start  You will need to type in your name, and allow camera and microphone access  This is HIPPA compliant, and secure  If you have not already done so, get immunized to COVID 19  We are committed to getting you vaccinated as soon as possible and will be closely following CDC and [facility]  guidelines as they are released and revised  Please refer to our COVID-19 vaccine webpage for the most up to date information on the vaccine and our distribution efforts  This site will also have the most up to date recommendations for COVID booster vaccine  [place]    Call [phone] ([phone]), option 7    OUR NEW LOCATION:    [address] , [place], [address]  Fax: [phone]    Lab services and OB/GYN are at this location as well

## 2022-10-12 NOTE — LETTER
2022     Pepe Galarza MD  [address] [facility] [address]    Patient: Harsh Ramires   YOB: 1940   Date of Visit: 10/12/2022       Dear Dr Lydia De La Fuente: Thank you for referring Harsh Ramires to me for evaluation  Below are my notes for this consultation  If you have questions, please do not hesitate to call me  I look forward to following your patient along with you  Sincerely,        Mariaa Block MD        CC: No Recipients  Mariaa Block MD  10/12/2022  3:32 PM  Incomplete  Name: Harsh Ramires      : 1940      MRN: [id number]  Encounter Provider: Mariaa Block MD  Encounter Date: 10/12/2022   Encounter department: [place] [address]     1  Essential hypertension  -     CBC and differential; Future  -     Comprehensive metabolic panel; Future; Expected date: 10/12/2022  -     TSH, 3rd generation; Future; Expected date: 10/12/2022    2  Mixed hyperlipidemia    3  Type 2 diabetes mellitus with retinopathy, without long-term current use of insulin, macular edema presence unspecified, unspecified laterality, unspecified retinopathy severity ([address] )    4  Vision changes  -     CBC and differential; Future  -     Comprehensive metabolic panel; Future; Expected date: 10/12/2022  -     TSH, 3rd generation; Future; Expected date: 10/12/2022    5  Tachycardia  -     CBC and differential; Future  -     Comprehensive metabolic panel; Future; Expected date: 10/12/2022  -     TSH, 3rd generation; Future; Expected date: 10/12/2022           Subjective      Chief Complaint   Patient presents with   • Hypertension     Pt started with a shadow in the left eye so she went to the [facility] and they think there is hemorrhage in the eye  Also pt is having issues with her jaw as well and Dr Pricila Hancock at [facility] for [facility] said see PCP and have BP checked as this could be a artery issue  kw       Please see chief complaint      Patient did go to the eye doctor today  They had ordered a sed rate for her  This is looking for temporal arteritis  Has been having some issues with chewing and pains  Has also had some issues with vision  She went to ophtho, and he recommended follow here for BP, and to have labs done  Reviewed PMH and active problems  Patient did check her blood pressure  Use a wrist cuff at home  She did notice some elevations at home recently  Prior to this, she would get between 712 and 326 systolic  At the ophthalmology office today, her systolic was 744  She also did get an elevated blood pressure at home this morning      Review of Systems    Current Outpatient Medications on File Prior to Visit   Medication Sig   • ascorbic acid (VITAMIN C) 250 mg tablet Take 250 mg by mouth daily   • Biotin 52435 MCG TABS Take by mouth     • Cholecalciferol (VITAMIN D) 2000 units CAPS Take 1 capsule by mouth 2 (two) times a day   • hydrochlorothiazide (HYDRODIURIL) 50 mg tablet TAKE 1 TABLET BY MOUTH EVERY DAY   • pravastatin (PRAVACHOL) 10 mg tablet TAKE 1 TABLET BY MOUTH EVERYDAY AT BEDTIME   • [DISCONTINUED] clobetasol (TEMOVATE) 0 05 % ointment APPLY TO AFFECTED AREA TWICE A DAY (Patient taking differently: No sig reported)   • [DISCONTINUED] diclofenac sodium (VOLTAREN) 1 % Apply 2 g topically 4 (four) times a day (Patient not taking: No sig reported)   • [DISCONTINUED] metFORMIN (GLUCOPHAGE) 500 mg tablet TAKE 1 TABLET BY MOUTH EVERY DAY AS DIRECTED (Patient not taking: Reported on 10/12/2022)       Objective     /68 (BP Location: Right arm, Patient Position: Sitting)   Pulse (!) 120   Ht 5' 2" (1 575 m)   Wt 75 8 kg (167 lb 3 2 oz)   SpO2 97%   BMI 30 58 kg/m²     Physical Exam  MD Dorcas Arizmendi MD  10/12/2022  3:25 PM  Sign when Signing Visit  Name: Juli Graves      : 1940      MRN: [id number]  Encounter Provider: Dorcas Valdez MD  Encounter Date: 10/12/2022   Encounter department: [facility]     1  Essential hypertension    2  Mixed hyperlipidemia    3  Type 2 diabetes mellitus with retinopathy, without long-term current use of insulin, macular edema presence unspecified, unspecified laterality, unspecified retinopathy severity ([address] )    4  Vision changes    5  Tachycardia         Subjective      Chief Complaint   Patient presents with   • Hypertension     Pt started with a shadow in the left eye so she went to the [facility] and they think there is hemorrhage in the eye  Also pt is having issues with her jaw as well and Dr Tete Barry at [facility] for sight said see PCP and have BP checked as this could be a artery issue  kw       Please see chief complaint  Patient did go to the eye doctor today  They had ordered a sed rate for her  This is looking for temporal arteritis  Has been having some issues with chewing and pains  Has also had some issues with vision  She went to ophtho, and he recommended follow here for BP, and to have labs done  Reviewed PMH and active problems  Patient did check her blood pressure  Use a wrist cuff at home  She did notice some elevations at home recently  Prior to this, she would get between 603 and 909 systolic  At the ophthalmology office today, her systolic was 962  She also did get an elevated blood pressure at home this morning      Review of Systems    Current Outpatient Medications on File Prior to Visit   Medication Sig   • ascorbic acid (VITAMIN C) 250 mg tablet Take 250 mg by mouth daily   • Biotin 37411 MCG TABS Take by mouth     • Cholecalciferol (VITAMIN D) 2000 units CAPS Take 1 capsule by mouth 2 (two) times a day   • hydrochlorothiazide (HYDRODIURIL) 50 mg tablet TAKE 1 TABLET BY MOUTH EVERY DAY   • pravastatin (PRAVACHOL) 10 mg tablet TAKE 1 TABLET BY MOUTH EVERYDAY AT BEDTIME   • [DISCONTINUED] clobetasol (TEMOVATE) 0 05 % ointment APPLY TO AFFECTED AREA TWICE A DAY (Patient taking differently: No sig reported)   • [DISCONTINUED] diclofenac sodium (VOLTAREN) 1 % Apply 2 g topically 4 (four) times a day (Patient not taking: No sig reported)   • [DISCONTINUED] metFORMIN (GLUCOPHAGE) 500 mg tablet TAKE 1 TABLET BY MOUTH EVERY DAY AS DIRECTED (Patient not taking: Reported on 10/12/2022)       Objective     /68 (BP Location: Right arm, Patient Position: Sitting)   Pulse (!) 120   Ht 5' 2" (1 575 m)   Wt 75 8 kg (167 lb 3 2 oz)   SpO2 97%   BMI 30 58 kg/m²     Physical Exam  Davonte Toledo MD

## 2022-10-12 NOTE — ASSESSMENT & PLAN NOTE
Patient does have hyperlipidemia  She is currently on pravastatin  Last labs in June or quite reasonable  Recheck CMP  She also reports family history of heart disease, therefore would be concerned about vascular changes to the eye, but again she did see Ophthalmology in they would be the ones to evaluate today  Check in the future as scheduled, continue on pravastatin

## 2022-10-12 NOTE — ASSESSMENT & PLAN NOTE
Patient has been having some vision changes  Ophthalmology clearly was worried about temporal arteritis based on ordering sed rate  Vision is still somewhat blurry, about the same as before  Again, recheck some labs, but I would not make any adjustments based on blood pressure  Should follow-up with Ophthalmology  Encourage them to get the sed rate done today

## 2022-10-12 NOTE — PROGRESS NOTES
Name: Ho Lyon      : 1940      MRN: [id number]  Encounter Provider: Almas Souza MD  Encounter Date: 10/12/2022   Encounter department: [place] [address]     1  Essential hypertension  Assessment & Plan:  Blood pressure today was reasonable  Home blood pressures previously have also been reasonable, though was slightly elevated today  Agree with Ophthalmology ordering sed rate for temporal arteritis  Will also check CBC, CMP, TSH  No changes in medications at the moment, as her blood pressure in the office was better  Orders:  -     CBC and differential; Future  -     Comprehensive metabolic panel; Future; Expected date: 10/12/2022  -     TSH, 3rd generation; Future; Expected date: 10/12/2022    2  Mixed hyperlipidemia  Assessment & Plan:  Patient does have hyperlipidemia  She is currently on pravastatin  Last labs in  or quite reasonable  Recheck CMP  She also reports family history of heart disease, therefore would be concerned about vascular changes to the eye, but again she did see Ophthalmology in they would be the ones to evaluate today  Check in the future as scheduled, continue on pravastatin  3  Type 2 diabetes mellitus with retinopathy, without long-term current use of insulin, macular edema presence unspecified, unspecified laterality, unspecified retinopathy severity ([address] )  Assessment & Plan:    Lab Results   Component Value Date    HGBA1C 5 8 (H) 2022   Last A1c was fantastic off medication  4  Vision changes  Assessment & Plan:  Patient has been having some vision changes  Ophthalmology clearly was worried about temporal arteritis based on ordering sed rate  Vision is still somewhat blurry, about the same as before  Again, recheck some labs, but I would not make any adjustments based on blood pressure  Should follow-up with Ophthalmology  Encourage them to get the sed rate done today      Orders:  -     CBC and differential; Future  -     Comprehensive metabolic panel; Future; Expected date: 10/12/2022  -     TSH, 3rd generation; Future; Expected date: 10/12/2022    5  Tachycardia  Comments:  Initial exam showed increased heart rate, but EKG was normal     Orders:  -     CBC and differential; Future  -     Comprehensive metabolic panel; Future; Expected date: 10/12/2022  -     TSH, 3rd generation; Future; Expected date: 10/12/2022  -     POCT ECG           Subjective      Chief Complaint   Patient presents with   • Hypertension     Pt started with a shadow in the left eye so she went to the [facility] and they think there is hemorrhage in the eye  Also pt is having issues with her jaw as well and Dr Ermias Rodriguez at [facility] said see PCP and have BP checked as this could be a artery issue  kw       Please see chief complaint  Patient did go to the eye doctor today  They had ordered a sed rate for her  This is looking for temporal arteritis  Has been having some issues with chewing and pains  Has also had some issues with vision  She went to ophtho, and he recommended follow here for BP, and to have labs done  Reviewed PMH and active problems  Patient did check her blood pressure  Use a wrist cuff at home  She did notice some elevations at home recently  Prior to this, she would get between 491 and 342 systolic  At the ophthalmology office today, her systolic was 294  She also did get an elevated blood pressure at home this morning      Review of Systems    Current Outpatient Medications on File Prior to Visit   Medication Sig   • ascorbic acid (VITAMIN C) 250 mg tablet Take 250 mg by mouth daily   • Biotin 98664 MCG TABS Take by mouth     • Cholecalciferol (VITAMIN D) 2000 units CAPS Take 1 capsule by mouth 2 (two) times a day   • hydrochlorothiazide (HYDRODIURIL) 50 mg tablet TAKE 1 TABLET BY MOUTH EVERY DAY   • pravastatin (PRAVACHOL) 10 mg tablet TAKE 1 TABLET BY MOUTH EVERYDAY AT BEDTIME   • [DISCONTINUED] clobetasol (TEMOVATE) 0 05 % ointment APPLY TO AFFECTED AREA TWICE A DAY (Patient taking differently: No sig reported)   • [DISCONTINUED] diclofenac sodium (VOLTAREN) 1 % Apply 2 g topically 4 (four) times a day (Patient not taking: No sig reported)   • [DISCONTINUED] metFORMIN (GLUCOPHAGE) 500 mg tablet TAKE 1 TABLET BY MOUTH EVERY DAY AS DIRECTED (Patient not taking: Reported on 10/12/2022)       Objective     /68 (BP Location: Right arm, Patient Position: Sitting)   Pulse (!) 120   Ht 5' 2" (1 575 m)   Wt 75 8 kg (167 lb 3 2 oz)   SpO2 97%   BMI 30 58 kg/m²     Physical Exam  Vitals and nursing note reviewed  Constitutional:       Appearance: She is well-developed  HENT:      Head: Normocephalic and atraumatic  Cardiovascular:      Rate and Rhythm: Normal rate and regular rhythm  Pulses:           Carotid pulses are 2+ on the right side and 2+ on the left side  Heart sounds: Normal heart sounds  Pulmonary:      Effort: Pulmonary effort is normal       Breath sounds: Normal breath sounds  No wheezing or rales  Chest:      Chest wall: No tenderness         Jeff Carter MD

## 2022-10-12 NOTE — ASSESSMENT & PLAN NOTE
Lab Results   Component Value Date    HGBA1C 5 8 (H) 06/21/2022   Last A1c was fantastic off medication

## 2022-10-12 NOTE — ASSESSMENT & PLAN NOTE
Blood pressure today was reasonable  Home blood pressures previously have also been reasonable, though was slightly elevated today  Agree with Ophthalmology ordering sed rate for temporal arteritis  Will also check CBC, CMP, TSH  No changes in medications at the moment, as her blood pressure in the office was better

## 2022-10-13 ENCOUNTER — TELEPHONE (OUTPATIENT)
Dept: FAMILY MEDICINE CLINIC | Facility: CLINIC | Age: 82
End: 2022-10-13

## 2022-10-13 DIAGNOSIS — H53.9 VISION CHANGES: Primary | ICD-10-CM

## 2022-10-13 NOTE — TELEPHONE ENCOUNTER
ADILENE from [place] from [facility] called in and stated that they believe that the patient may need a temporal artery biopsy  They can be reached at [phone] either ask for adilene or altagracia   Please advise   Thank you

## 2022-10-14 ENCOUNTER — HOSPITAL ENCOUNTER (OUTPATIENT)
Dept: NON INVASIVE DIAGNOSTICS | Facility: HOSPITAL | Age: 82
Discharge: HOME | End: 2022-10-14
Payer: COMMERCIAL

## 2022-10-14 ENCOUNTER — TELEPHONE (OUTPATIENT)
Dept: VASCULAR SURGERY | Facility: CLINIC | Age: 82
End: 2022-10-14

## 2022-10-14 DIAGNOSIS — H53.9 VISION CHANGES: Primary | ICD-10-CM

## 2022-10-14 DIAGNOSIS — F41.9 ANXIETY: Primary | ICD-10-CM

## 2022-10-14 DIAGNOSIS — R70.0 ELEVATED SED RATE: ICD-10-CM

## 2022-10-14 DIAGNOSIS — H47.012 ISCHEMIC OPTIC NEUROPATHY OF LEFT EYE: ICD-10-CM

## 2022-10-14 DIAGNOSIS — H53.9 VISION CHANGES: ICD-10-CM

## 2022-10-14 PROCEDURE — 93882 EXTRACRANIAL UNI/LTD STUDY: CPT

## 2022-10-14 PROCEDURE — 93882 EXTRACRANIAL UNI/LTD STUDY: CPT | Performed by: SURGERY

## 2022-10-14 RX ORDER — ALPRAZOLAM 0.25 MG/1
0.25 TABLET ORAL DAILY PRN
Qty: 10 TABLET | Refills: 0 | Status: SHIPPED | OUTPATIENT
Start: 2022-10-14 | End: 2022-10-27 | Stop reason: SDUPTHER

## 2022-10-14 RX ORDER — PREDNISONE 20 MG/1
60 TABLET ORAL DAILY
Qty: 90 TABLET | Refills: 0 | Status: SHIPPED | OUTPATIENT
Start: 2022-10-14 | End: 2022-11-13

## 2022-10-14 NOTE — TELEPHONE ENCOUNTER
Received call from Carl Wright PA-C requesting patient be scheduled for temporal artery biopsy  I advised we would need to do a temporal artery duplex first  Order was placed and patient is scheduled for today at 1pm at the [facility]  Patient and her  are aware  Biopsy is to be done regardless per ordering office  Biopsy is scheduled for 10/17/22 in [facility] with Dr Pablo Leyva  TT sent to Dr Pablo Leyva 10/14/22 to make him aware

## 2022-10-14 NOTE — TELEPHONE ENCOUNTER
Verified patient's insurance   CONFIRMED - Patient's insurance is [employer]  Is patient requesting a call when authorization has been obtained? Patient did not request a call  Surgery Date: 10/17/22  Primary Surgeon: Sade Humphrey // Jodi Canada (NPI: [id number])  Assisting Surgeon: Not Applicable (N/A)  Facility: [facility] (Tax: [id number] / NPI: [id number])  Inpatient / Outpatient: Outpatient  Level: 1    Clearance Received: No clearance ordered  Consent Received: Consent will be signed day of procedure  Medication Hold / Last Dose: Not Applicable (N/A)  VQI Spreadsheet: Not Applicable (N/A)  IR Notified: Not Applicable (N/A)  Rep   Notified: Not Applicable (N/A)  Equipment Needs: Not Applicable (N/A)  Vas Lab Requested: Not Applicable (N/A)  Patient Contacted: 10/14/22    Diagnosis: H53 9, H47 012, R70 0  Procedure/ CPT Code(s): Temporal Artery Biopsy // CPT: 04494    For varicose vein related procedures:   Last LEVDR: Not Applicable (N/A)  CEAP Classification: Not Applicable (N/A)  VCSS: Not Applicable (N/A)    Post Operative Date/ Time: To Be Determined (TBD)

## 2022-10-14 NOTE — TELEPHONE ENCOUNTER
Authorization requirements reviewed  Please refer to Jennifer Stafford / Magdaleno Spurling number [id number] for case updates      No authorization required

## 2022-10-14 NOTE — TELEPHONE ENCOUNTER
1  Vision changes  Assessment & Plan:  Daniel Panchal PA-C has been talking with Ophthalmology as well as vascular  At this point, the patient is recommended to start on prednisone based on vascular is recommendation  Her sed rate was also elevated  Patient had initially seen ophthalmology for this, and there was no recommendations from them with regard to medication initially  Also, when she saw us initially for this, there were no notations available from Ophthalmology  Will start her on prednisone, 20 mg, take 3 tablets daily for 30 days, and await the follow-up from vascular surgery  Orders:  -     predniSONE 20 mg tablet;  Take 3 tablets (60 mg total) by mouth daily

## 2022-10-14 NOTE — TELEPHONE ENCOUNTER
Called and left message on pts machine making them aware that Dr James Eagle has prescribed prednisone to take as directed and to keep her f/u apt with TH

## 2022-10-14 NOTE — TELEPHONE ENCOUNTER
Pt is scheduled at 1 pm for duplex   asking for something to calm pts nerves for the weekend  Xanax   25 mg called to [facility]

## 2022-10-14 NOTE — TELEPHONE ENCOUNTER
[address] [place] [address]  Pt was found to have elevated sed rate, difficulty chewing, HTN, vision changes and ischemic optic neuropathy by exam  Pt needs temporal artery bx on L  Called pt and she is free for any apts needed  Called vascular and ordered a temporal artery duplex and placed order for vascular  They will call pt and set up  PT states she continues with a grey shadow top L eye which seems like it may be breaking up

## 2022-10-14 NOTE — ASSESSMENT & PLAN NOTE
Ramiro Champagne PA-C has been talking with Ophthalmology as well as vascular  At this point, the patient is recommended to start on prednisone based on vascular is recommendation  Her sed rate was also elevated  Patient had initially seen ophthalmology for this, and there was no recommendations from them with regard to medication initially  Also, when she saw us initially for this, there were no notations available from Ophthalmology  Will start her on prednisone, 20 mg, take 3 tablets daily for 30 days, and await the follow-up from vascular surgery

## 2022-10-17 ENCOUNTER — HOSPITAL ENCOUNTER (OUTPATIENT)
Facility: HOSPITAL | Age: 82
Setting detail: OUTPATIENT SURGERY
Discharge: HOME | End: 2022-10-17
Attending: SURGERY | Admitting: SURGERY
Payer: COMMERCIAL

## 2022-10-17 VITALS
TEMPERATURE: 97.5 F | BODY MASS INDEX: 30.63 KG/M2 | DIASTOLIC BLOOD PRESSURE: 58 MMHG | OXYGEN SATURATION: 98 % | RESPIRATION RATE: 16 BRPM | SYSTOLIC BLOOD PRESSURE: 150 MMHG | HEIGHT: 62 IN | HEART RATE: 84 BPM | WEIGHT: 166.45 LBS

## 2022-10-17 DIAGNOSIS — H53.9 VISION CHANGES: ICD-10-CM

## 2022-10-17 DIAGNOSIS — H47.012 ISCHEMIC OPTIC NEUROPATHY OF LEFT EYE: Primary | ICD-10-CM

## 2022-10-17 DIAGNOSIS — R70.0 ELEVATED SED RATE: ICD-10-CM

## 2022-10-17 LAB — GLUCOSE SERPL-MCNC: 104 MG/DL (ref 65–140)

## 2022-10-17 PROCEDURE — 88305 TISSUE EXAM BY PATHOLOGIST: CPT | Performed by: PATHOLOGY

## 2022-10-17 PROCEDURE — 82948 REAGENT STRIP/BLOOD GLUCOSE: CPT

## 2022-10-17 RX ORDER — LIDOCAINE HYDROCHLORIDE AND EPINEPHRINE 10; 10 MG/ML; UG/ML
INJECTION, SOLUTION INFILTRATION; PERINEURAL AS NEEDED
Status: DISCONTINUED | OUTPATIENT
Start: 2022-10-17 | End: 2022-10-17 | Stop reason: HOSPADM

## 2022-10-17 RX ORDER — OXYCODONE HYDROCHLORIDE AND ACETAMINOPHEN 5; 325 MG/1; MG/1
1 TABLET ORAL EVERY 4 HOURS PRN
Status: DISCONTINUED | OUTPATIENT
Start: 2022-10-17 | End: 2022-10-17 | Stop reason: HOSPADM

## 2022-10-17 RX ORDER — IBUPROFEN 600 MG/1
600 TABLET ORAL EVERY 6 HOURS PRN
Qty: 30 TABLET | Refills: 0 | Status: SHIPPED | OUTPATIENT
Start: 2022-10-17

## 2022-10-17 NOTE — OP NOTE
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]     * Elevated sed rate [R70 0]    Procedure(s) (LRB):  BIOPSY ARTERY TEMPORAL (Bilateral)    Specimen(s):  ID Type Source Tests Collected by Time Destination   1 : LEFT TEMPORAL ARTERY Tissue Artery TISSUE EXAM Mary Eldridge, DO 10/17/2022 08    2 : RIGHT TEMPORAL ARTERY Tissue Artery TISSUE EXAM Mary Eldridge, DO 10/17/2022 0836        Estimated Blood Loss:   Minimal    Drains:  * No LDAs found *    Anesthesia Type:   Local    Operative Indications:  Vision changes [H53 9]  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]  Elevated sed rate [R70 0]  Patient is an 71-year-old woman who presented to her ophthalmologist/PCP with complaints of left eye visual disturbances as well as jaw pain  Inflammatory markers were noted to be elevated  Giant cell  arteritis was suspected  She was initiated on steroid therapy  Vascular surgery was consulted for temporal arteritis/temporal artery biopsy  Operative Findings:  Fibrotic appearing segment of left temporal artery  Thickened segment of right temporal artery       Complications:   None    Procedure and Technique:  The patient was properly identified in the preop holding area  The patient's name and nature procedure verified  Patient brought to the operating room where she was positioned supine on the OR table  The patient is left temporal region was prepped using chlorhexidine and draped in usual sterile fashion  A formal time-out was and all in agreement    Local anesthetic was infiltrated to skin subcutaneous tissue  A small skin incision was carried out approximately 1 fingerbreadth anterior to the tragus and extended cephalad using a 15  Scalpel  The incision was deepened down through the subcutaneous tissue using the electrocautery  A combination of blunt and sharp dissection was next carried out  The temporal artery was identified  General appearance of the artery appeared thickened and fibrotic  The segment of artery was dissected free from its surrounding tissue  The artery was next hemoclipped proximally and distally and a small segment transected and sent off as specimen as “left temporal artery " The wound was irrigated  Hemostasis was secured  The incision was closed using Monocryl suture  Exofin glue was applied to the skin incision site  Attention was next turned towards the right temporal artery  The patient was repositioned with the head turned towards the left  The site was cleaned using chlorhexidine  Local anesthetic was infiltrated skin and subcutaneous tissue  In a simple fashion a skin incision was carried out approximately 1 fingerbreadth anterior to the tragus  The incision was deepened down through the subcutaneous tissue using electrocautery  A combination of blunt and sharp dissection was carried out down to the temporal artery  The temporal artery was free from its surrounding structures  Two small little branches were ligated using 4-0 silk ties  The artery was next hemoclipped proximally and distally and transected  The segment was sent as specimen as "right temporal artery " The wound was irrigated and hemostasis secured  The incision was reapproximated using interrupted Monocryl suture  Exofin glue was applied to the incision site  Patient tolerated procedure well  She was taken off the operating room table and returned to the recovery area     I was present for the entire procedure and A qualified resident physician was not available    Patient Disposition:  PACU         SIGNATURE: Betti Essex, DO  DATE: October 17, 2022  TIME: 9:04 AM

## 2022-10-17 NOTE — DISCHARGE INSTRUCTIONS
DISCHARGE INSTRUCTIONS  TEMPORAL ARTERY BIOPSY    ACTIVITY: Resume your normal activities and exercise schedule as tolerated  If you were driving prior to the procedure, you may resume driving the day after your surgery  DIET:  Resume your normal diet  Good nutrition is important for healing of your incision  INCISION: Your surgeon may have used surgical glue to close your incision  There are stitches present under the skin which will absorb on their own  The glue is used to cover the incision, assist in closure, and prevent contamination  This adhesive will darken and peel away on its own within one to two weeks  Wash incision daily with soap and water, but do not rub or scrub the incision; rinse thoroughly and pat dry  It is normal to have mild swelling or discoloration around the incision  If increasing redness or pain develops, call our office immediately  Numbness in the region of the incision may occur following the surgery  This normally improves over six to twelve months  DO NOT put any powders, creams, ointments, or lotions on your incision  FOLLOW UP STUDIES:  You should follow up with your PCP within 1-2 weeks for the results of the biopsy and to discuss further treatment recommendations  If you have increased pain, fever >101 5, increased drainage, redness or a bad smell at your surgery site, new coldness/numbness of your arm or leg, please call us immediately and GO directly to the ER  PLEASE CALL THE OFFICE IF YOU HAVE ANY QUESTIONS  [phone]  [phone] [address] [facility] , [address], [facility] [place], [address] [address]  600 [facility] [address], [address], [place], 210 [facility]  [address] [facility], [address] [place] [address]  [address] [place] [address], One [facility], [facility], [address] [facility]  [address] P[address], [address]st Floor, [address], [address] [facility], [facility]  1307 [facility], [address] [facility], 960 [facility]  One [facility], 1st Floor, [address] [address] [facility] 6  201 [facility], [address]  [address] [place] [address]

## 2022-10-17 NOTE — INTERVAL H&P NOTE
H&P reviewed  After examining the patient I find no changes in the patients condition since the H&P had been written  Plan: Bilateral temporal artery biopsy  Procedure, risks, benefits, alternatives, and anticipated postoperative course discussed in detail  All questions answered to his/her satisfaction  Patient was agreeable to proceed  Written consent was obtained      Vitals:    10/17/22 0706   BP: (!) 199/77   Pulse:    Resp:    Temp:    SpO2:

## 2022-10-18 PROCEDURE — 88305 TISSUE EXAM BY PATHOLOGIST: CPT | Performed by: PATHOLOGY

## 2022-10-27 ENCOUNTER — OFFICE VISIT (OUTPATIENT)
Dept: FAMILY MEDICINE CLINIC | Facility: CLINIC | Age: 82
End: 2022-10-27
Payer: COMMERCIAL

## 2022-10-27 VITALS
DIASTOLIC BLOOD PRESSURE: 76 MMHG | RESPIRATION RATE: 16 BRPM | WEIGHT: 162 LBS | HEIGHT: 62 IN | BODY MASS INDEX: 29.81 KG/M2 | SYSTOLIC BLOOD PRESSURE: 138 MMHG | HEART RATE: 80 BPM

## 2022-10-27 DIAGNOSIS — M31.6 TEMPORAL ARTERITIS (HCC): Primary | ICD-10-CM

## 2022-10-27 DIAGNOSIS — I10 ESSENTIAL HYPERTENSION: ICD-10-CM

## 2022-10-27 DIAGNOSIS — M20.41 HAMMERTOES OF BOTH FEET: ICD-10-CM

## 2022-10-27 DIAGNOSIS — K59.00 CONSTIPATION, UNSPECIFIED CONSTIPATION TYPE: ICD-10-CM

## 2022-10-27 DIAGNOSIS — M20.42 HAMMERTOES OF BOTH FEET: ICD-10-CM

## 2022-10-27 DIAGNOSIS — E11.319 TYPE 2 DIABETES MELLITUS WITH RETINOPATHY, WITHOUT LONG-TERM CURRENT USE OF INSULIN, MACULAR EDEMA PRESENCE UNSPECIFIED, UNSPECIFIED LATERALITY, UNSPECIFIED RETINOPATHY SEVERITY (HCC): ICD-10-CM

## 2022-10-27 DIAGNOSIS — B35.1 ONYCHOMYCOSIS: ICD-10-CM

## 2022-10-27 DIAGNOSIS — F41.9 ANXIETY: ICD-10-CM

## 2022-10-27 LAB — SL AMB POCT HEMOGLOBIN AIC: 6.3 (ref ?–6.5)

## 2022-10-27 PROCEDURE — 99214 OFFICE O/P EST MOD 30 MIN: CPT | Performed by: FAMILY MEDICINE

## 2022-10-27 PROCEDURE — 83036 HEMOGLOBIN GLYCOSYLATED A1C: CPT | Performed by: FAMILY MEDICINE

## 2022-10-27 RX ORDER — ALPRAZOLAM 0.25 MG/1
0.25 TABLET ORAL DAILY PRN
Qty: 10 TABLET | Refills: 0 | Status: SHIPPED | OUTPATIENT
Start: 2022-10-27

## 2022-10-27 NOTE — LETTER
2022     Marina Tony MD  [address] [facility] [id number]    Patient: Ismael Wesley   YOB: 1940   Date of Visit: 10/27/2022       Dear Dr Joel Farrell: Thank you for referring Ismael Wesley to me for evaluation  Below are my notes for this consultation  Follow up after biopsy and US  I have included results in my note for your review  Currently on Prednisone 60mg daily  Will look to decrease in future  If you have questions, please do not hesitate to call me  I look forward to following your patient along with you  Sincerely,        Moy Rosales MD        CC: No Recipients  Moy Rosales MD  10/27/2022  3:13 PM  Sign when Signing Visit  Name: Ismael Wesley      : 1940      MRN: [id number]  Encounter Provider: Moy Rosales MD  Encounter Date: 10/27/2022   Encounter department: [place] [address]     1  Temporal arteritis ([facility] [address] )  Assessment & Plan:  Biopsy proved temporal arteritis  Ultrasound had been negative  At this time, she is going to continue to follow with Ophthalmology  Continue on prednisone  Will look to decrease the dose after 1 month  She did mention that some of revision is missing on the left, peripheral vision  Again, follow with Ophthalmology for that  Will look to see if she does need to see a specialist such as Rheumatology  Again, for now no specific changes  2  Type 2 diabetes mellitus with retinopathy, without long-term current use of insulin, macular edema presence unspecified, unspecified laterality, unspecified retinopathy severity ([address] )  Assessment & Plan:    Lab Results   Component Value Date    HGBA1C 6 3 10/27/2022   A1C is a bit higher than before, but no specific changes needed  She is doing well with this without medications  Orders:  -     POCT hemoglobin A1c    3  Essential hypertension  Assessment & Plan:  Blood pressure stable    No changes needed at the moment  4  Hammertoes of both feet  Assessment & Plan:  Patient does have hammertoes bilaterally  Continue follow-up with podiatry  Make sure that she trims nails  Hammertoes can be a pre callus issue  5  Onychomycosis  Assessment & Plan:  Patient does have onychomycosis  Follows with Podiatry  No changes  6  Constipation, unspecified constipation type  Assessment & Plan:  Patient does have constipation, having a bowel movement approximately 1 time a week  I would recommend that she use fiber supplements daily, which could include Per Yoly  Beyond that, she could also talk about using MiraLax, Colace  7  Anxiety  Assessment & Plan:  Significant increased anxiety recently with the current episode temporal arteritis  Renew alprazolam   Reviewed with her and  that this is not a medication that I would like to keep on long-term, but I will renew this 1 time  Orders:  -     ALPRAZolam (XANAX) 0 25 mg tablet; Take 1 tablet (0 25 mg total) by mouth daily as needed for anxiety      Depression Screening and Follow-up Plan: Patient was screened for depression during today's encounter  They screened negative with a PHQ-2 score of 0  Subjective      Patient is here to follow-up after her recent evaluation for temporal arteritis  Ultrasound was negative, but the biopsies were positive  She is currently on steroids  Currently on 60 mg daily  She does have follow-up with Ophthalmology  Currently, she did notice that there is some decreased peripheral vision, especially on the left  DM:     Review of Systems   Constitutional: Negative  HENT: Negative  Eyes: Positive for visual disturbance  Respiratory: Negative  Cardiovascular: Negative  Gastrointestinal: Negative  Endocrine: Negative  Genitourinary: Negative  Musculoskeletal: Negative  Skin: Negative  Allergic/Immunologic: Negative  Neurological: Negative  Hematological: Negative  Psychiatric/Behavioral: Negative  Current Outpatient Medications on File Prior to Visit   Medication Sig   • ascorbic acid (VITAMIN C) 250 mg tablet Take 250 mg by mouth daily   • Biotin 12692 MCG TABS Take by mouth     • Cholecalciferol (VITAMIN D) 2000 units CAPS Take 1 capsule by mouth 2 (two) times a day   • hydrochlorothiazide (HYDRODIURIL) 50 mg tablet TAKE 1 TABLET BY MOUTH EVERY DAY   • ibuprofen (MOTRIN) 600 mg tablet Take 1 tablet (600 mg total) by mouth every 6 (six) hours as needed for moderate pain   • pravastatin (PRAVACHOL) 10 mg tablet TAKE 1 TABLET BY MOUTH EVERYDAY AT BEDTIME   • predniSONE 20 mg tablet Take 3 tablets (60 mg total) by mouth daily   • [DISCONTINUED] ALPRAZolam (XANAX) 0 25 mg tablet Take 1 tablet (0 25 mg total) by mouth daily as needed for anxiety       Objective       Temporal Artery US:  [facility]   CLINICAL:   Indications:   PT C/O grey shadow of left eye, difficulty chewing, elevated sed rate, and   ischemic optic neuropathy, Symptoms X 3 days  PT denies headache and not   started taking Prednisone at time of this evaluation  Lab: Sed rate:   Abnormal: 57/mm/hour  PT is scheduled for temporal artery biopsy on   10/17/2022  Operative History:   No prior heart or vascular surgery   Risk Factors   The patient has history of HTN, Diabetes, and hyperlipidemia  She is a   non-smoker  Height: 62 inches  Weight: 167 lbs  FINDINGS:   Segment Rig Left   PSV EDV PSV EDV   Com  Carotid 106 12 51 0   ICA 54 10 83 17   Ext Carotid 76 0 97 9   Common Superficial Temporal 104 11 51 0   Middle Temporal Artery 81 5 70 0   Frontal 54 8 33 0   Parietal 33 5 48 5   CONCLUSION:   Impression   RIGHT SIDE:   There is no evidence of giant cell arteritis, aneurysm, or significant stenosis   noted in the tortuous superficial temporal artery and its branches     LEFT SIDE:   There is no evidence of giant cell arteritis, aneurysm, or significant stenosis   noted in the tortuous superficial temporal artery and its branches  Technical findings posted on EPIC  SIGNATURE:   Electronically Signed by: Claudia Thomas on 2022-10-14 04:08:40 PM    Pathology report  Case Report Surgical Pathology Report Case: [id number] Authorizing Provider: Yadira Urena DO Collected: 10/17/2022 9817 Ordering Location: [facility] Received: 10/17/2022 [address] [place] [address]  Operating Room Pathologist: Jemma Calderon MD Specimens: A) - Artery, LEFT TEMPORAL ARTERY B) - Artery, RIGHT TEMPORAL ARTERY Final Diagnosis   A  Artery, left temporal:   - Temporal giant cell arteritis  B  Artery, right temporal:   - Temporal giant cell arteritis  Electronically signed by Jemma Calderon MD on 10/18/2022 at 1:33 PM     Hemoglobin A1c by fingerstick:  5 3  Prior to biopsy:  sed rate was 57  TSH 0 627  Sodium 134, potassium 3 6, calcium 11 1  Blood sugar 105  Creatinine 0 96, GFR:  55  AST 12, ALT 23  White count 8 17, Hemoglobin 12 9, hematocrit 39 1, platelets 081  /76 (BP Location: Left arm, Patient Position: Sitting, Cuff Size: Large)   Pulse 80   Resp 16   Ht 5' 2" (1 575 m)   Wt 73 5 kg (162 lb)   BMI 29 63 kg/m²         Diabetic Foot Exam    Patient's shoes and socks removed  Right Foot/Ankle   Right Foot Inspection  Skin Exam: skin normal, skin intact and dry skin  No warmth, no callus, no erythema, no maceration, no abnormal color, no pre-ulcer, no ulcer and no callus  Toe Exam: right toe deformity (Hammertoes)  Sensory   Vibration: intact  Proprioception: intact  Monofilament testing: intact    Left Foot/Ankle  Left Foot Inspection  Skin Exam: skin normal, skin intact and dry skin  No warmth, no erythema, no maceration, normal color, no pre-ulcer, no ulcer and no callus  Toe Exam: left toe deformity ( hammertoe)       Sensory   Vibration: intact  Proprioception: intact  Monofilament testing: intact    Assign Risk Category  Deformity present  Loss of protective sensation  No weak pulses  Risk: 2    Physical Exam  Vitals and nursing note reviewed  Constitutional:       Appearance: She is well-developed  HENT:      Head: Normocephalic and atraumatic  Cardiovascular:      Rate and Rhythm: Normal rate and regular rhythm  Pulses: no weak pulses          Carotid pulses are 2+ on the right side and 2+ on the left side  Heart sounds: Normal heart sounds  Pulmonary:      Effort: Pulmonary effort is normal       Breath sounds: Normal breath sounds  No wheezing or rales  Chest:      Chest wall: No tenderness  Feet:      Right foot:      Skin integrity: Dry skin present  No ulcer, skin breakdown, erythema, warmth or callus  Left foot:      Skin integrity: Dry skin present  No ulcer, skin breakdown, erythema, warmth or callus         Calvin Frias MD

## 2022-10-27 NOTE — ASSESSMENT & PLAN NOTE
Patient does have hammertoes bilaterally  Continue follow-up with podiatry  Make sure that she trims nails  Hammertoes can be a pre callus issue

## 2022-10-27 NOTE — ASSESSMENT & PLAN NOTE
Lab Results   Component Value Date    HGBA1C 6 3 10/27/2022   A1C is a bit higher than before, but no specific changes needed  She is doing well with this without medications

## 2022-10-27 NOTE — ASSESSMENT & PLAN NOTE
Biopsy proved temporal arteritis  Ultrasound had been negative  At this time, she is going to continue to follow with Ophthalmology  Continue on prednisone  Will look to decrease the dose after 1 month  She did mention that some of revision is missing on the left, peripheral vision  Again, follow with Ophthalmology for that  Will look to see if she does need to see a specialist such as Rheumatology  Again, for now no specific changes

## 2022-10-27 NOTE — PATIENT INSTRUCTIONS
1  Temporal arteritis ([facility] [address] )  Assessment & Plan:  Biopsy proved temporal arteritis  Ultrasound had been negative  At this time, she is going to continue to follow with Ophthalmology  Continue on prednisone  Will look to decrease the dose after 1 month  She did mention that some of revision is missing on the left, peripheral vision  Again, follow with Ophthalmology for that  Will look to see if she does need to see a specialist such as Rheumatology  Again, for now no specific changes  2  Type 2 diabetes mellitus with retinopathy, without long-term current use of insulin, macular edema presence unspecified, unspecified laterality, unspecified retinopathy severity ([facility] [address] )  Assessment & Plan:    Lab Results   Component Value Date    HGBA1C 6 3 10/27/2022   A1C is a bit higher than before, but no specific changes needed  She is doing well with this without medications  Orders:  -     POCT hemoglobin A1c    3  Essential hypertension  Assessment & Plan:  Blood pressure stable  No changes needed at the moment  4  Hammertoes of both feet  Assessment & Plan:  Patient does have hammertoes bilaterally  Continue follow-up with podiatry  Make sure that she trims nails  Hammertoes can be a pre callus issue  5  Onychomycosis  Assessment & Plan:  Patient does have onychomycosis  Follows with Podiatry  No changes  6  Constipation, unspecified constipation type  Assessment & Plan:  Patient does have constipation, having a bowel movement approximately 1 time a week  I would recommend that she use fiber supplements daily, which could include Per Yoly  Beyond that, she could also talk about using MiraLax, Colace  7  Anxiety  Assessment & Plan:  Significant increased anxiety recently with the current episode temporal arteritis  Renew alprazolam   Reviewed with her and  that this is not a medication that I would like to keep on long-term, but I will renew this 1 time      Orders:  - ALPRAZolam (XANAX) 0 25 mg tablet; Take 1 tablet (0 25 mg total) by mouth daily as needed for anxiety      COVID 19 Instructions    Cain Lundborg was advised to limit contact with others to essential tasks such as getting food, medications, and medical care  Proper handwashing reviewed, and Hand sanitzer when washing is not available  If the patient develops symptoms of COVID 19, the patient should call the office as soon as possible  For 6155-1241 Flu season, it is strongly recommended that Flu Vaccinations be obtained  Virtual Visits:  Anthony: This works on smart phones (any phone with Internet browsing capability)  You should get a text message when the provider is ready to see you  Click on the link in the text message, and the call should start  You will need to type in your name, and allow camera and microphone access  This is HIPPA compliant, and secure  If you have not already done so, get immunized to COVID 19  We are committed to getting you vaccinated as soon as possible and will be closely following CDC and [facility]  guidelines as they are released and revised  Please refer to our COVID-19 vaccine webpage for the most up to date information on the vaccine and our distribution efforts  This site will also have the most up to date recommendations for COVID booster vaccine  Khanh hermosillo    Call [phone] ([phone]), option 7    OUR NEW LOCATION:    [address], [address] [place], [place] [address] [address] , [place], [address]  Fax: [phone]    Lab services and OB/GYN are at this location as well

## 2022-10-27 NOTE — ASSESSMENT & PLAN NOTE
Significant increased anxiety recently with the current episode temporal arteritis  Renew alprazolam   Reviewed with her and  that this is not a medication that I would like to keep on long-term, but I will renew this 1 time

## 2022-10-27 NOTE — ASSESSMENT & PLAN NOTE
Patient does have constipation, having a bowel movement approximately 1 time a week  I would recommend that she use fiber supplements daily, which could include Per Yoly  Beyond that, she could also talk about using MiraLax, Colace

## 2022-10-27 NOTE — PROGRESS NOTES
Name: Anamaria Ochoa      : 1940      MRN: [id number]  Encounter Provider: Sboia Borges MD  Encounter Date: 10/27/2022   Encounter department: [place] [address]  Temporal arteritis ([facility] )  Assessment & Plan:  Biopsy proved temporal arteritis  Ultrasound had been negative  At this time, she is going to continue to follow with Ophthalmology  Continue on prednisone  Will look to decrease the dose after 1 month  She did mention that some of revision is missing on the left, peripheral vision  Again, follow with Ophthalmology for that  Will look to see if she does need to see a specialist such as Rheumatology  Again, for now no specific changes  2  Type 2 diabetes mellitus with retinopathy, without long-term current use of insulin, macular edema presence unspecified, unspecified laterality, unspecified retinopathy severity ([facility] )  Assessment & Plan:    Lab Results   Component Value Date    HGBA1C 6 3 10/27/2022   A1C is a bit higher than before, but no specific changes needed  She is doing well with this without medications  Orders:  -     POCT hemoglobin A1c    3  Essential hypertension  Assessment & Plan:  Blood pressure stable  No changes needed at the moment  4  Hammertoes of both feet  Assessment & Plan:  Patient does have hammertoes bilaterally  Continue follow-up with podiatry  Make sure that she trims nails  Hammertoes can be a pre callus issue  5  Onychomycosis  Assessment & Plan:  Patient does have onychomycosis  Follows with Podiatry  No changes  6  Constipation, unspecified constipation type  Assessment & Plan:  Patient does have constipation, having a bowel movement approximately 1 time a week  I would recommend that she use fiber supplements daily, which could include Per Yoly  Beyond that, she could also talk about using MiraLax, Colace        7  Anxiety  Assessment & Plan:  Significant increased anxiety recently with the current episode temporal arteritis  Renew alprazolam   Reviewed with her and  that this is not a medication that I would like to keep on long-term, but I will renew this 1 time  Orders:  -     ALPRAZolam (XANAX) 0 25 mg tablet; Take 1 tablet (0 25 mg total) by mouth daily as needed for anxiety        Depression Screening and Follow-up Plan: Patient was screened for depression during today's encounter  They screened negative with a PHQ-2 score of 0  Subjective      Patient is here to follow-up after her recent evaluation for temporal arteritis  Ultrasound was negative, but the biopsies were positive  She is currently on steroids  Currently on 60 mg daily  She does have follow-up with Ophthalmology  Currently, she did notice that there is some decreased peripheral vision, especially on the left  DM:     Review of Systems   Constitutional: Negative  HENT: Negative  Eyes: Positive for visual disturbance  Respiratory: Negative  Cardiovascular: Negative  Gastrointestinal: Negative  Endocrine: Negative  Genitourinary: Negative  Musculoskeletal: Negative  Skin: Negative  Allergic/Immunologic: Negative  Neurological: Negative  Hematological: Negative  Psychiatric/Behavioral: Negative          Current Outpatient Medications on File Prior to Visit   Medication Sig   • ascorbic acid (VITAMIN C) 250 mg tablet Take 250 mg by mouth daily   • Biotin 05719 MCG TABS Take by mouth     • Cholecalciferol (VITAMIN D) 2000 units CAPS Take 1 capsule by mouth 2 (two) times a day   • hydrochlorothiazide (HYDRODIURIL) 50 mg tablet TAKE 1 TABLET BY MOUTH EVERY DAY   • ibuprofen (MOTRIN) 600 mg tablet Take 1 tablet (600 mg total) by mouth every 6 (six) hours as needed for moderate pain   • pravastatin (PRAVACHOL) 10 mg tablet TAKE 1 TABLET BY MOUTH EVERYDAY AT BEDTIME   • predniSONE 20 mg tablet Take 3 tablets (60 mg total) by mouth daily   • [DISCONTINUED] ALPRAZolam Skinny Vazquez) 0 25 mg tablet Take 1 tablet (0 25 mg total) by mouth daily as needed for anxiety       Objective       Temporal Artery US:  [facility]PORT   CLINICAL:   Indications:   PT C/O grey shadow of left eye, difficulty chewing, elevated sed rate, and   ischemic optic neuropathy, Symptoms X 3 days  PT denies headache and not   started taking Prednisone at time of this evaluation  Lab: Sed rate:   Abnormal: 57/mm/hour  PT is scheduled for temporal artery biopsy on   10/17/2022  Operative History:   No prior heart or vascular surgery   Risk Factors   The patient has history of HTN, Diabetes, and hyperlipidemia  She is a   non-smoker  Height: 62 inches  Weight: 167 lbs  FINDINGS:   Segment Rig Left   PSV EDV PSV EDV   Com  Carotid 106 12 51 0   ICA 54 10 83 17   Ext Carotid 76 0 97 9   Common Superficial Temporal 104 11 51 0   Middle Temporal Artery 81 5 70 0   Frontal 54 8 33 0   Parietal 33 5 48 5   CONCLUSION:   Impression   RIGHT SIDE:   There is no evidence of giant cell arteritis, aneurysm, or significant stenosis   noted in the tortuous superficial temporal artery and its branches  LEFT SIDE:   There is no evidence of giant cell arteritis, aneurysm, or significant stenosis   noted in the tortuous superficial temporal artery and its branches  Technical findings posted on EPIC  SIGNATURE:   Electronically Signed by: Scar Duran on 2022-10-14 04:08:40 PM    Pathology report  Case Report Surgical Pathology Report Case: [id number] Authorizing Provider: Ronald Dave DO Collected: 10/17/2022 [id number] Ordering Location: [facility] Received: 10/17/2022 [facility] Operating Room Pathologist: Jemma Calderon MD Specimens: A) - Artery, LEFT TEMPORAL ARTERY B) - Artery, RIGHT TEMPORAL ARTERY Final Diagnosis   A  Artery, left temporal:   - Temporal giant cell arteritis  B  Artery, right temporal:   - Temporal giant cell arteritis     Electronically signed by Greyson Thomas Rosa Blanca MD on 10/18/2022 at 1:33 PM     Hemoglobin A1c by fingerstick:  5 3  Prior to biopsy:  sed rate was 57  TSH 0 627  Sodium 134, potassium 3 6, calcium 11 1  Blood sugar 105  Creatinine 0 96, GFR:  55  AST 12, ALT 23  White count 8 17, Hemoglobin 12 9, hematocrit 39 1, platelets 835  /76 (BP Location: Left arm, Patient Position: Sitting, Cuff Size: Large)   Pulse 80   Resp 16   Ht 5' 2" (1 575 m)   Wt 73 5 kg (162 lb)   BMI 29 63 kg/m²         Diabetic Foot Exam    Patient's shoes and socks removed  Right Foot/Ankle   Right Foot Inspection  Skin Exam: skin normal, skin intact and dry skin  No warmth, no callus, no erythema, no maceration, no abnormal color, no pre-ulcer, no ulcer and no callus  Toe Exam: right toe deformity (Hammertoes)  Sensory   Vibration: intact  Proprioception: intact  Monofilament testing: intact    Left Foot/Ankle  Left Foot Inspection  Skin Exam: skin normal, skin intact and dry skin  No warmth, no erythema, no maceration, normal color, no pre-ulcer, no ulcer and no callus  Toe Exam: left toe deformity ( hammertoe)  Sensory   Vibration: intact  Proprioception: intact  Monofilament testing: intact    Assign Risk Category  Deformity present  Loss of protective sensation  No weak pulses  Risk: 2    Physical Exam  Vitals and nursing note reviewed  Constitutional:       Appearance: She is well-developed  HENT:      Head: Normocephalic and atraumatic  Cardiovascular:      Rate and Rhythm: Normal rate and regular rhythm  Pulses: no weak pulses          Carotid pulses are 2+ on the right side and 2+ on the left side  Heart sounds: Normal heart sounds  Pulmonary:      Effort: Pulmonary effort is normal       Breath sounds: Normal breath sounds  No wheezing or rales  Chest:      Chest wall: No tenderness  Feet:      Right foot:      Skin integrity: Dry skin present  No ulcer, skin breakdown, erythema, warmth or callus  Left foot:      Skin integrity: Dry skin present  No ulcer, skin breakdown, erythema, warmth or callus         Moy Rosales MD

## 2022-11-02 ENCOUNTER — TELEPHONE (OUTPATIENT)
Dept: FAMILY MEDICINE CLINIC | Facility: CLINIC | Age: 82
End: 2022-11-02

## 2022-11-02 DIAGNOSIS — H53.9 VISION CHANGES: ICD-10-CM

## 2022-11-02 DIAGNOSIS — M31.6 TEMPORAL ARTERITIS (HCC): Primary | ICD-10-CM

## 2022-11-02 RX ORDER — PREDNISONE 20 MG/1
60 TABLET ORAL DAILY
Qty: 90 TABLET | Refills: 0 | Status: CANCELLED | OUTPATIENT
Start: 2022-11-02 | End: 2022-12-02

## 2022-11-02 NOTE — TELEPHONE ENCOUNTER
Patient's  called  He states that wife is on a strong dose of Prednisone for 30 days  At the end of the 30 days she is to call back and at that point either Dr Saulo Keating or Lowanda Spray will decide what dosage she will go on now to be on long term   would like to know now what she should do  He already left a message on the prescription line regarding this  Please advise  Thank you

## 2022-11-02 NOTE — TELEPHONE ENCOUNTER
Left message for patient to call due to Androgel 1% not covered. He must try and fail Testosterone Cypionate injections first.   Room Air saturation on rest-90%  Room Air saturation on ambulation-86%  Oxygen saturation on 2L NC-94%   Would decrease to 50mg for 2 weeks, then to 40mg for 2 weeks, then 30mg for 2 weeks, then 20mg for 2 weeks, then 10mg therafter  Should have repeat CRP

## 2022-11-03 RX ORDER — PREDNISONE 10 MG/1
TABLET ORAL
Qty: 224 TABLET | Refills: 0 | Status: SHIPPED | OUTPATIENT
Start: 2022-11-03 | End: 2023-01-26

## 2022-11-03 RX ORDER — PREDNISONE 10 MG/1
10 TABLET ORAL DAILY
Qty: 30 TABLET | Refills: 5 | Status: SHIPPED | OUTPATIENT
Start: 2023-01-27

## 2022-11-03 NOTE — TELEPHONE ENCOUNTER
TH, Pts spouse aware of dosage change for her Prednisone, Pt is asking for a prescription for the 10 mg Prednisone to be sent to [facility] in [place], Also can you put a script in the computer for pt to get labs done?

## 2022-11-04 ENCOUNTER — TELEPHONE (OUTPATIENT)
Dept: FAMILY MEDICINE CLINIC | Facility: CLINIC | Age: 82
End: 2022-11-04

## 2022-11-04 DIAGNOSIS — M31.6 TEMPORAL ARTERITIS (HCC): Primary | ICD-10-CM

## 2022-11-04 NOTE — TELEPHONE ENCOUNTER
1  Temporal arteritis (HCC)  -     C-reactive protein; Future  -     Sedimentation rate, automated;  Future

## 2022-11-04 NOTE — TELEPHONE ENCOUNTER
TH, Pts  stopped into the office and is requesting a lab slip to be printed out for pt to get her CRP, can you put an order into the computer?

## 2022-11-14 ENCOUNTER — APPOINTMENT (OUTPATIENT)
Dept: LAB | Facility: MEDICAL CENTER | Age: 82
End: 2022-11-14

## 2022-11-14 DIAGNOSIS — M31.6 TEMPORAL ARTERITIS (HCC): ICD-10-CM

## 2022-11-14 LAB
CRP SERPL QL: <3 MG/L
ERYTHROCYTE [SEDIMENTATION RATE] IN BLOOD: 4 MM/HOUR (ref 0–29)

## 2022-11-21 ENCOUNTER — TELEPHONE (OUTPATIENT)
Dept: OTHER | Facility: OTHER | Age: 82
End: 2022-11-21

## 2022-11-21 ENCOUNTER — OFFICE VISIT (OUTPATIENT)
Dept: FAMILY MEDICINE CLINIC | Facility: CLINIC | Age: 82
End: 2022-11-21

## 2022-11-21 VITALS
DIASTOLIC BLOOD PRESSURE: 72 MMHG | OXYGEN SATURATION: 96 % | SYSTOLIC BLOOD PRESSURE: 124 MMHG | BODY MASS INDEX: 28.93 KG/M2 | TEMPERATURE: 64 F | WEIGHT: 157.2 LBS | HEIGHT: 62 IN

## 2022-11-21 DIAGNOSIS — M31.6 TEMPORAL ARTERITIS (HCC): ICD-10-CM

## 2022-11-21 DIAGNOSIS — E11.319 TYPE 2 DIABETES MELLITUS WITH RETINOPATHY, WITHOUT LONG-TERM CURRENT USE OF INSULIN, MACULAR EDEMA PRESENCE UNSPECIFIED, UNSPECIFIED LATERALITY, UNSPECIFIED RETINOPATHY SEVERITY (HCC): ICD-10-CM

## 2022-11-21 DIAGNOSIS — I10 ESSENTIAL HYPERTENSION: ICD-10-CM

## 2022-11-21 DIAGNOSIS — J40 BRONCHITIS: Primary | ICD-10-CM

## 2022-11-21 LAB
SARS-COV-2 AG UPPER RESP QL IA: NEGATIVE
VALID CONTROL: NORMAL

## 2022-11-21 RX ORDER — BENZONATATE 200 MG/1
200 CAPSULE ORAL 3 TIMES DAILY PRN
Qty: 30 CAPSULE | Refills: 0 | Status: SHIPPED | OUTPATIENT
Start: 2022-11-21 | End: 2022-12-01

## 2022-11-21 RX ORDER — AZITHROMYCIN 250 MG/1
TABLET, FILM COATED ORAL
Qty: 6 TABLET | Refills: 0 | Status: SHIPPED | OUTPATIENT
Start: 2022-11-21 | End: 2022-11-26

## 2022-11-21 NOTE — PATIENT INSTRUCTIONS
Assessment/plan:   Bronchitis -rapid COVID test was negative  She does have crackles bilateral lung fields  Would recommend starting on azithromycin and Tessalon Perles, 200 mg 3 times daily as needed for cough  Recommend follow-up if symptoms do not improve in the next 5-7 days or sooner if symptoms worsen  2   Temporal arteritis -presently stable on steroid therapy  Continues to follow with specialist     3  Type 2 diabetes - stable, continue follow-up with regular provider  4   Benign essential hypertension -stable  Recommend avoiding decongestants  May use Coricidin HBP for  congestion as needed

## 2022-11-21 NOTE — PROGRESS NOTES
Name: So Espinoza      : 1940      MRN: [id number]  Encounter Provider: Nini Lopez PA-C  Encounter Date: 2022   Encounter department: [facility] PRIMARY CARE    Assessment & Plan   Patient Instructions     Assessment/plan:  1  Bronchitis -rapid COVID test was negative  She does have crackles bilateral lung fields  Would recommend starting on azithromycin and Tessalon Perles, 200 mg 3 times daily as needed for cough  Recommend follow-up if symptoms do not improve in the next 5-7 days or sooner if symptoms worsen  2  2   Temporal arteritis -presently stable on steroid therapy  Continues to follow with specialist     3  3  Type 2 diabetes - stable, continue follow-up with regular provider  4  4   Benign essential hypertension -stable  Recommend avoiding decongestants  May use Coricidin HBP for  congestion as needed  1  Bronchitis  -     azithromycin (Zithromax) 250 mg tablet; Take 2 tablets (500 mg total) by mouth daily for 1 day, THEN 1 tablet (250 mg total) daily for 4 days  -     benzonatate (TESSALON) 200 MG capsule; Take 1 capsule (200 mg total) by mouth 3 (three) times a day as needed for cough for up to 10 days  -     POCT Rapid Covid Ag    2  Type 2 diabetes mellitus with retinopathy, without long-term current use of insulin, macular edema presence unspecified, unspecified laterality, unspecified retinopathy severity ([address] )    3  Essential hypertension    4  Temporal arteritis (HCC)           Subjective        HPI:  This is an 76-year-old female who presents to the office accompanied by her   She started with symptoms of cough and chest congestion about 4-5 days ago  This is about 2 days after her  came down with the symptoms  She is currently on steroid therapy for temporal arteritis and seems to be doing a little bit worse with more chest congestion and mucus production  She has also had more fatigue and difficulty with stamina around the house  She is not having any chest pain however  She has not taken any cough medications over-the-counter as she was concerned with her other medications and did not want to mix without talking to a health care provider  Review of Systems   Constitutional: Positive for activity change and fatigue  Negative for fever  HENT: Positive for congestion, postnasal drip, rhinorrhea and sore throat  Negative for ear pain and sinus pressure  Respiratory: Positive for cough  Negative for chest tightness, shortness of breath and wheezing  Cardiovascular: Negative for palpitations  Gastrointestinal: Negative for diarrhea and nausea  Musculoskeletal: Negative for arthralgias and myalgias  Skin: Negative for rash  Neurological: Negative for dizziness and numbness  All other systems reviewed and are negative  Current Outpatient Medications on File Prior to Visit   Medication Sig   • ALPRAZolam (XANAX) 0 25 mg tablet Take 1 tablet (0 25 mg total) by mouth daily as needed for anxiety   • ascorbic acid (VITAMIN C) 250 mg tablet Take 250 mg by mouth daily   • Biotin 60751 MCG TABS Take by mouth     • Cholecalciferol (VITAMIN D) 2000 units CAPS Take 1 capsule by mouth 2 (two) times a day   • hydrochlorothiazide (HYDRODIURIL) 50 mg tablet TAKE 1 TABLET BY MOUTH EVERY DAY   • ibuprofen (MOTRIN) 600 mg tablet Take 1 tablet (600 mg total) by mouth every 6 (six) hours as needed for moderate pain   • pravastatin (PRAVACHOL) 10 mg tablet TAKE 1 TABLET BY MOUTH EVERYDAY AT BEDTIME   • predniSONE 10 mg tablet Take 5 tablets (50 mg total) by mouth daily for 14 days, THEN 4 tablets (40 mg total) daily for 14 days, THEN 3 tablets (30 mg total) daily for 14 days, THEN 2 tablets (20 mg total) daily for 14 days, THEN 1 tablet (10 mg total) daily for 28 days  • [START ON 1/27/2023] predniSONE 10 mg tablet Take 1 tablet (10 mg total) by mouth daily Do not start before January 27, 2023         Objective     /72 (BP Location: Left arm, Patient Position: Sitting)   Temp (!) 64 °F (17 8 °C)   Ht 5' 2" (1 575 m)   Wt 71 3 kg (157 lb 3 2 oz)   SpO2 96%   BMI 28 75 kg/m²     Physical Exam  Vitals and nursing note reviewed  Constitutional:       General: She is not in acute distress  Appearance: She is well-developed and well-nourished  HENT:      Head: Normocephalic and atraumatic  Right Ear: External ear normal       Left Ear: External ear normal       Nose: Nose normal       Mouth/Throat:      Mouth: Oropharynx is clear and moist       Pharynx: No oropharyngeal exudate  Eyes:      Extraocular Movements: EOM normal       Conjunctiva/sclera: Conjunctivae normal       Pupils: Pupils are equal, round, and reactive to light  Neck:      Thyroid: No thyromegaly  Trachea: No tracheal deviation  Cardiovascular:      Rate and Rhythm: Normal rate and regular rhythm  Heart sounds: Normal heart sounds  No murmur heard  No friction rub  Pulmonary:      Effort: Pulmonary effort is normal  No respiratory distress  Breath sounds: Normal breath sounds  No wheezing or rales  Comments:   Harsh crackles bilateral lung fields  Abdominal:      General: Bowel sounds are normal  There is no distension  Palpations: Abdomen is soft  Tenderness: There is no abdominal tenderness  There is no guarding or rebound  Musculoskeletal:         General: No tenderness or edema  Normal range of motion  Cervical back: Normal range of motion and neck supple  Lymphadenopathy:      Cervical: No cervical adenopathy  Skin:     General: Skin is warm and dry  Findings: No erythema or rash  Neurological:      Mental Status: She is alert and oriented to person, place, and time  Cranial Nerves: No cranial nerve deficit  Coordination: Coordination normal    Psychiatric:         Mood and Affect: Mood and affect normal          Behavior: Behavior normal          Thought Content:  Thought content normal  Deveron Dance, PA-C

## 2022-12-07 ENCOUNTER — TELEPHONE (OUTPATIENT)
Dept: OTHER | Facility: OTHER | Age: 82
End: 2022-12-07

## 2022-12-07 DIAGNOSIS — M85.80 OSTEOPENIA, UNSPECIFIED LOCATION: Primary | ICD-10-CM

## 2022-12-07 DIAGNOSIS — Z01.818 PREOP TESTING: ICD-10-CM

## 2022-12-07 DIAGNOSIS — I10 ESSENTIAL HYPERTENSION: ICD-10-CM

## 2022-12-07 DIAGNOSIS — Z01.818 PREOP GENERAL PHYSICAL EXAM: ICD-10-CM

## 2022-12-07 NOTE — TELEPHONE ENCOUNTER
Sylvia ([facility]) called in stating pt is having a dexa scan on 12/12 and she needs a script faxed to [phone]

## 2022-12-08 LAB
LEFT EYE DIABETIC RETINOPATHY: NORMAL
RIGHT EYE DIABETIC RETINOPATHY: NORMAL
SEVERITY (EYE EXAM): NORMAL

## 2022-12-08 RX ORDER — HYDROCHLOROTHIAZIDE 50 MG/1
TABLET ORAL
Qty: 90 TABLET | Refills: 3 | Status: SHIPPED | OUTPATIENT
Start: 2022-12-08

## 2022-12-14 DIAGNOSIS — M85.80 OSTEOPENIA, UNSPECIFIED LOCATION: ICD-10-CM

## 2022-12-15 NOTE — RESULT ENCOUNTER NOTE
Please let pt know that muscle cramps can be caused by several different factors: electrolyte imbalance, not enough water intake, medications  In order to resolve the issue we must try to figure out why we are getting them in the first place  For now I would make sure she is drinking enough water and then we can talk about it in more detail at her [facility]  After she has labs so we can look at her electrolytes  If she is not taking a MVI she should start

## 2022-12-20 ENCOUNTER — APPOINTMENT (EMERGENCY)
Dept: RADIOLOGY | Facility: HOSPITAL | Age: 82
End: 2022-12-20

## 2022-12-20 ENCOUNTER — APPOINTMENT (INPATIENT)
Dept: NON INVASIVE DIAGNOSTICS | Facility: HOSPITAL | Age: 82
End: 2022-12-20

## 2022-12-20 ENCOUNTER — HOSPITAL ENCOUNTER (INPATIENT)
Facility: HOSPITAL | Age: 82
LOS: 3 days | Discharge: HOME HEALTH | End: 2022-12-23
Attending: EMERGENCY MEDICINE | Admitting: INTERNAL MEDICINE

## 2022-12-20 ENCOUNTER — OFFICE VISIT (OUTPATIENT)
Dept: FAMILY MEDICINE CLINIC | Facility: CLINIC | Age: 82
End: 2022-12-20

## 2022-12-20 ENCOUNTER — APPOINTMENT (EMERGENCY)
Dept: CT IMAGING | Facility: HOSPITAL | Age: 82
End: 2022-12-20

## 2022-12-20 VITALS
TEMPERATURE: 97.8 F | DIASTOLIC BLOOD PRESSURE: 70 MMHG | SYSTOLIC BLOOD PRESSURE: 152 MMHG | OXYGEN SATURATION: 97 % | HEIGHT: 62 IN | WEIGHT: 154 LBS | BODY MASS INDEX: 28.34 KG/M2 | HEART RATE: 132 BPM | RESPIRATION RATE: 16 BRPM

## 2022-12-20 DIAGNOSIS — M31.6 TEMPORAL ARTERITIS (HCC): ICD-10-CM

## 2022-12-20 DIAGNOSIS — R31.9 HEMATURIA: ICD-10-CM

## 2022-12-20 DIAGNOSIS — Z01.818 PREOP TESTING: ICD-10-CM

## 2022-12-20 DIAGNOSIS — Z01.818 PREOP GENERAL PHYSICAL EXAM: ICD-10-CM

## 2022-12-20 DIAGNOSIS — I10 ESSENTIAL HYPERTENSION: ICD-10-CM

## 2022-12-20 DIAGNOSIS — R26.2 AMBULATORY DYSFUNCTION: ICD-10-CM

## 2022-12-20 DIAGNOSIS — R00.0 SINUS TACHYCARDIA: ICD-10-CM

## 2022-12-20 DIAGNOSIS — I26.99 ACUTE PULMONARY EMBOLISM WITHOUT ACUTE COR PULMONALE (HCC): ICD-10-CM

## 2022-12-20 DIAGNOSIS — R53.1 WEAKNESS: Primary | ICD-10-CM

## 2022-12-20 DIAGNOSIS — I26.99 PULMONARY EMBOLISM (HCC): ICD-10-CM

## 2022-12-20 DIAGNOSIS — R06.02 SOB (SHORTNESS OF BREATH): Primary | ICD-10-CM

## 2022-12-20 DIAGNOSIS — I49.9 DYSRHYTHMIA: ICD-10-CM

## 2022-12-20 DIAGNOSIS — N95.2 ATROPHIC VAGINITIS: ICD-10-CM

## 2022-12-20 PROBLEM — R41.3 MEMORY CHANGES: Status: ACTIVE | Noted: 2022-12-20

## 2022-12-20 PROBLEM — E87.6 HYPOKALEMIA: Status: ACTIVE | Noted: 2022-12-20

## 2022-12-20 LAB
2HR DELTA HS TROPONIN: 4 NG/L
4HR DELTA HS TROPONIN: 7 NG/L
ALBUMIN SERPL BCP-MCNC: 2.9 G/DL (ref 3.5–5)
ALP SERPL-CCNC: 50 U/L (ref 46–116)
ALT SERPL W P-5'-P-CCNC: 50 U/L (ref 12–78)
ANION GAP SERPL CALCULATED.3IONS-SCNC: 9 MMOL/L (ref 4–13)
ANISOCYTOSIS BLD QL SMEAR: PRESENT
APTT PPP: 20 SECONDS (ref 23–37)
AST SERPL W P-5'-P-CCNC: 18 U/L (ref 5–45)
ATRIAL RATE: 90 BPM
ATRIAL RATE: 97 BPM
ATRIAL RATE: 99 BPM
BACTERIA UR QL AUTO: ABNORMAL /HPF
BASOPHILS # BLD MANUAL: 0 THOUSAND/UL (ref 0–0.1)
BASOPHILS NFR MAR MANUAL: 0 % (ref 0–1)
BILIRUB SERPL-MCNC: 1.29 MG/DL (ref 0.2–1)
BILIRUB UR QL STRIP: NEGATIVE
BUN SERPL-MCNC: 19 MG/DL (ref 5–25)
CALCIUM ALBUM COR SERPL-MCNC: 10.6 MG/DL (ref 8.3–10.1)
CALCIUM SERPL-MCNC: 9.7 MG/DL (ref 8.3–10.1)
CARDIAC TROPONIN I PNL SERPL HS: 33 NG/L
CARDIAC TROPONIN I PNL SERPL HS: 37 NG/L
CARDIAC TROPONIN I PNL SERPL HS: 40 NG/L
CHLORIDE SERPL-SCNC: 100 MMOL/L (ref 96–108)
CLARITY UR: ABNORMAL
CO2 SERPL-SCNC: 30 MMOL/L (ref 21–32)
COLOR UR: YELLOW
CREAT SERPL-MCNC: 0.72 MG/DL (ref 0.6–1.3)
D DIMER PPP FEU-MCNC: 1.84 UG/ML FEU
EOSINOPHIL # BLD MANUAL: 0 THOUSAND/UL (ref 0–0.4)
EOSINOPHIL NFR BLD MANUAL: 0 % (ref 0–6)
ERYTHROCYTE [DISTWIDTH] IN BLOOD BY AUTOMATED COUNT: 16 % (ref 11.6–15.1)
FLUAV RNA RESP QL NAA+PROBE: NEGATIVE
FLUBV RNA RESP QL NAA+PROBE: NEGATIVE
GFR SERPL CREATININE-BSD FRML MDRD: 78 ML/MIN/1.73SQ M
GLUCOSE SERPL-MCNC: 126 MG/DL (ref 65–140)
GLUCOSE SERPL-MCNC: 156 MG/DL (ref 65–140)
GLUCOSE SERPL-MCNC: 195 MG/DL (ref 65–140)
GLUCOSE UR STRIP-MCNC: NEGATIVE MG/DL
HCT VFR BLD AUTO: 36.4 % (ref 34.8–46.1)
HGB BLD-MCNC: 12.3 G/DL (ref 11.5–15.4)
HGB UR QL STRIP.AUTO: ABNORMAL
INR PPP: 1.04 (ref 0.84–1.19)
KETONES UR STRIP-MCNC: NEGATIVE MG/DL
LEUKOCYTE ESTERASE UR QL STRIP: NEGATIVE
LYMPHOCYTES # BLD AUTO: 0.32 THOUSAND/UL (ref 0.6–4.47)
LYMPHOCYTES # BLD AUTO: 6 % (ref 14–44)
MCH RBC QN AUTO: 29.4 PG (ref 26.8–34.3)
MCHC RBC AUTO-ENTMCNC: 33.8 G/DL (ref 31.4–37.4)
MCV RBC AUTO: 87 FL (ref 82–98)
MONOCYTES # BLD AUTO: 0.37 THOUSAND/UL (ref 0–1.22)
MONOCYTES NFR BLD: 7 % (ref 4–12)
NEUTROPHILS # BLD MANUAL: 4.59 THOUSAND/UL (ref 1.85–7.62)
NEUTS BAND NFR BLD MANUAL: 1 % (ref 0–8)
NEUTS SEG NFR BLD AUTO: 86 % (ref 43–75)
NITRITE UR QL STRIP: NEGATIVE
NON-SQ EPI CELLS URNS QL MICRO: ABNORMAL /HPF
OVALOCYTES BLD QL SMEAR: PRESENT
P AXIS: 67 DEGREES
P AXIS: 69 DEGREES
P AXIS: 73 DEGREES
PH UR STRIP.AUTO: 7.5 [PH]
PLATELET # BLD AUTO: 181 THOUSANDS/UL (ref 149–390)
PLATELET BLD QL SMEAR: ADEQUATE
PMV BLD AUTO: 9.6 FL (ref 8.9–12.7)
POTASSIUM SERPL-SCNC: 3.3 MMOL/L (ref 3.5–5.3)
PR INTERVAL: 128 MS
PR INTERVAL: 130 MS
PROT SERPL-MCNC: 6.2 G/DL (ref 6.4–8.4)
PROT UR STRIP-MCNC: NEGATIVE MG/DL
PROTHROMBIN TIME: 13.6 SECONDS (ref 11.6–14.5)
QRS AXIS: 29 DEGREES
QRS AXIS: 30 DEGREES
QRS AXIS: 31 DEGREES
QRSD INTERVAL: 76 MS
QRSD INTERVAL: 80 MS
QRSD INTERVAL: 84 MS
QT INTERVAL: 328 MS
QT INTERVAL: 346 MS
QT INTERVAL: 348 MS
QTC INTERVAL: 401 MS
QTC INTERVAL: 420 MS
QTC INTERVAL: 441 MS
RBC # BLD AUTO: 4.19 MILLION/UL (ref 3.81–5.12)
RBC #/AREA URNS AUTO: ABNORMAL /HPF
RSV RNA RESP QL NAA+PROBE: NEGATIVE
SARS-COV-2 RNA RESP QL NAA+PROBE: NEGATIVE
SODIUM SERPL-SCNC: 139 MMOL/L (ref 135–147)
SP GR UR STRIP.AUTO: 1.02 (ref 1–1.03)
T WAVE AXIS: 75 DEGREES
T WAVE AXIS: 76 DEGREES
T WAVE AXIS: 90 DEGREES
TSH SERPL DL<=0.05 MIU/L-ACNC: 0.64 UIU/ML (ref 0.45–4.5)
UROBILINOGEN UR QL STRIP.AUTO: 1 E.U./DL
VENTRICULAR RATE: 89 BPM
VENTRICULAR RATE: 90 BPM
VENTRICULAR RATE: 97 BPM
WBC # BLD AUTO: 5.28 THOUSAND/UL (ref 4.31–10.16)
WBC #/AREA URNS AUTO: ABNORMAL /HPF

## 2022-12-20 RX ORDER — PANTOPRAZOLE SODIUM 40 MG/1
40 TABLET, DELAYED RELEASE ORAL
Status: DISCONTINUED | OUTPATIENT
Start: 2022-12-21 | End: 2022-12-23 | Stop reason: HOSPADM

## 2022-12-20 RX ORDER — LATANOPROST 50 UG/ML
1 SOLUTION/ DROPS OPHTHALMIC
Status: DISCONTINUED | OUTPATIENT
Start: 2022-12-20 | End: 2022-12-23 | Stop reason: HOSPADM

## 2022-12-20 RX ORDER — HEPARIN SODIUM 10000 [USP'U]/100ML
3-30 INJECTION, SOLUTION INTRAVENOUS
Status: DISCONTINUED | OUTPATIENT
Start: 2022-12-20 | End: 2022-12-22

## 2022-12-20 RX ORDER — MULTIVIT WITH MINERALS/LUTEIN
250 TABLET ORAL DAILY
Status: DISCONTINUED | OUTPATIENT
Start: 2022-12-21 | End: 2022-12-23 | Stop reason: HOSPADM

## 2022-12-20 RX ORDER — ACETAMINOPHEN 325 MG/1
650 TABLET ORAL EVERY 6 HOURS PRN
Status: DISCONTINUED | OUTPATIENT
Start: 2022-12-20 | End: 2022-12-23 | Stop reason: HOSPADM

## 2022-12-20 RX ORDER — SODIUM CHLORIDE AND POTASSIUM CHLORIDE 150; 900 MG/100ML; MG/100ML
50 INJECTION, SOLUTION INTRAVENOUS CONTINUOUS
Status: DISCONTINUED | OUTPATIENT
Start: 2022-12-20 | End: 2022-12-22

## 2022-12-20 RX ORDER — SODIUM CHLORIDE 9 MG/ML
3 INJECTION INTRAVENOUS
Status: DISCONTINUED | OUTPATIENT
Start: 2022-12-20 | End: 2022-12-23 | Stop reason: HOSPADM

## 2022-12-20 RX ORDER — PREDNISONE 20 MG/1
20 TABLET ORAL DAILY
Status: DISCONTINUED | OUTPATIENT
Start: 2022-12-21 | End: 2022-12-23 | Stop reason: HOSPADM

## 2022-12-20 RX ORDER — DOCUSATE SODIUM 100 MG/1
100 CAPSULE, LIQUID FILLED ORAL 2 TIMES DAILY
Status: DISCONTINUED | OUTPATIENT
Start: 2022-12-21 | End: 2022-12-23 | Stop reason: HOSPADM

## 2022-12-20 RX ORDER — ENOXAPARIN SODIUM 100 MG/ML
40 INJECTION SUBCUTANEOUS DAILY
Status: DISCONTINUED | OUTPATIENT
Start: 2022-12-21 | End: 2022-12-20

## 2022-12-20 RX ORDER — ONDANSETRON 2 MG/ML
4 INJECTION INTRAMUSCULAR; INTRAVENOUS EVERY 6 HOURS PRN
Status: DISCONTINUED | OUTPATIENT
Start: 2022-12-20 | End: 2022-12-23 | Stop reason: HOSPADM

## 2022-12-20 RX ORDER — MELATONIN
1000 DAILY
Status: DISCONTINUED | OUTPATIENT
Start: 2022-12-21 | End: 2022-12-23 | Stop reason: HOSPADM

## 2022-12-20 RX ORDER — LATANOPROST 50 UG/ML
SOLUTION/ DROPS OPHTHALMIC
COMMUNITY
Start: 2022-12-07

## 2022-12-20 RX ORDER — INSULIN LISPRO 100 [IU]/ML
1-5 INJECTION, SOLUTION INTRAVENOUS; SUBCUTANEOUS
Status: DISCONTINUED | OUTPATIENT
Start: 2022-12-20 | End: 2022-12-23 | Stop reason: HOSPADM

## 2022-12-20 RX ORDER — INSULIN LISPRO 100 [IU]/ML
1-5 INJECTION, SOLUTION INTRAVENOUS; SUBCUTANEOUS
Status: DISCONTINUED | OUTPATIENT
Start: 2022-12-21 | End: 2022-12-23 | Stop reason: HOSPADM

## 2022-12-20 RX ORDER — POTASSIUM CHLORIDE 20 MEQ/1
40 TABLET, EXTENDED RELEASE ORAL ONCE
Status: COMPLETED | OUTPATIENT
Start: 2022-12-20 | End: 2022-12-20

## 2022-12-20 RX ORDER — PRAVASTATIN SODIUM 10 MG
10 TABLET ORAL
Status: DISCONTINUED | OUTPATIENT
Start: 2022-12-20 | End: 2022-12-23 | Stop reason: HOSPADM

## 2022-12-20 RX ORDER — ALPRAZOLAM 0.25 MG/1
0.25 TABLET ORAL DAILY PRN
Status: DISCONTINUED | OUTPATIENT
Start: 2022-12-20 | End: 2022-12-23 | Stop reason: HOSPADM

## 2022-12-20 RX ADMIN — CEFTRIAXONE 1000 MG: 1 INJECTION, POWDER, FOR SOLUTION INTRAMUSCULAR; INTRAVENOUS at 15:45

## 2022-12-20 RX ADMIN — SODIUM CHLORIDE 1000 ML: 0.9 INJECTION, SOLUTION INTRAVENOUS at 13:09

## 2022-12-20 RX ADMIN — POTASSIUM CHLORIDE 40 MEQ: 1500 TABLET, EXTENDED RELEASE ORAL at 20:33

## 2022-12-20 RX ADMIN — HEPARIN SODIUM 18 UNITS/KG/HR: 10000 INJECTION, SOLUTION INTRAVENOUS at 19:47

## 2022-12-20 RX ADMIN — Medication 12.5 MG: at 20:35

## 2022-12-20 NOTE — ED ATTENDING ATTESTATION
12/20/2022  Micah Villatoro MD, saw and evaluated the patient  I have discussed the patient with the resident/non-physician practitioner and agree with the resident's/non-physician practitioner's findings, Plan of Care, and MDM as documented in the resident's/non-physician practitioner's note, except where noted  All available labs and Radiology studies were reviewed  I was present for key portions of any procedure(s) performed by the resident/non-physician practitioner and I was immediately available to provide assistance  At this point I agree with the current assessment done in the Emergency Department  I have conducted an independent evaluation of this patient a history and physical is as follows:    ED Course         Critical Care Time  Procedures    82 y/o female with generalized weakness for the past 4 weeks  Today she was getting ready and collapsed - didn't hit her head  Saw her family   Today and they sent her in for further evaluation for being tachycardic  She had bronchitis a few weeks ago  No cough/congestion/fevers  +sob with walking  +hematuria, no dysuria  Pt  Is eating and drinking okay  No n/v/d, no abd  Pain, no cp  She is on steroids for temporal arteritis  She went off metformin a few months ago because her hemoglobin A1C was okay  Well-appearing, no distress, mildly dry MM, RRR, CTA b/l, abd  Nontender, ext  1+ edema LE b/l, neuro wnl, no focal deficits

## 2022-12-20 NOTE — ASSESSMENT & PLAN NOTE
Hematuria that developed over the past few days  Possibly uti  Will obtain urine culture  Start ceftriaxone

## 2022-12-20 NOTE — ASSESSMENT & PLAN NOTE
She has been ambulating less with right lower ext slightly more edematous then left  In which pe is on the differential  Could also be in response to uti  Pt has a contrast allergy  Will check d dimer and lower ext dopplers  Will start ceftriaxone for uti  Will check tsh  Check mg and keep electrolytes wnl

## 2022-12-20 NOTE — ASSESSMENT & PLAN NOTE
Lab Results   Component Value Date    HGBA1C 6 3 10/27/2022       No results for input(s): POCGLU in the last 72 hours  Blood Sugar Average: Last 72 hrs:      Had previously been on metformin which has been d/garrett  Will write for insulin sliding scale

## 2022-12-20 NOTE — ASSESSMENT & PLAN NOTE
Possibly early acute encephalopathy due to uti vs steroid induced vs early dementia  Will need eval by geriatrics outpt after steroid taper and uti treatment

## 2022-12-20 NOTE — QUICK NOTE
Venous doppler positive for subacute dvt in 1 out of 2 peroneal veins  Will start ac  Will hold on cta due to allergy to contrast and no change in management  Will start heparin gtt due to hematuria  Will need to monitor  If hematuria worsens consider ivc filter and vq scan

## 2022-12-20 NOTE — ED PROVIDER NOTES
History  Chief Complaint   Patient presents with   • Weakness - Generalized     Patient brought by EMS from PCP due to patient with tachycardic 's  Patient states she was being seen today at PCP due to generalized weakness for several weeks  Patient reports she had a controlled fall in bathroom today, landed on carpet  Denies injury from fall and states "I just lost my balance getting up"  Denies head strike, LOC, thinners  No pain  Patient is an 20-year-old female with a past medical history of diabetes, hypertension, and temporal arteritis who presented with weakness  Patient states she has been feeling weak over the past 4 weeks however it worsened today  She was getting ready to go to her doctor's appointment today when she lost her balance and fell  She did not strike her head or lose consciousness  She denies being on antiplatelet or anticoagulation medications  She states that she was lightheaded when she fell but attributes that to her decreased vision 2/2 her temporal arteritis  She states that her legs just felt weak and gave out from under her  She was seen by her primary care office today who sent her to the ED because of tachycardia and hematuria  Her heart rate was into the 130s at the office today  She has noticed heart palpitations over the past several weeks before falling asleep  The hematuria has been intermittent over the past 2 weeks  She does not have any burning with urination or frequency  She had bronchitis about 4 weeks ago however her viral symptoms have resolved  She is on high-dose steroids for temporal arteritis  She denies chest pain, shortness of breath, nausea, vomiting, or diarrhea  Prior to Admission Medications   Prescriptions Last Dose Informant Patient Reported? Taking?    ALPRAZolam (XANAX) 0 25 mg tablet Past Month  No Yes   Sig: Take 1 tablet (0 25 mg total) by mouth daily as needed for anxiety   Biotin 50960 MCG TABS 12/20/2022  Yes Yes   Sig: Take by mouth     Cholecalciferol (VITAMIN D) 2000 units CAPS 12/20/2022  Yes Yes   Sig: Take 1 capsule by mouth 2 (two) times a day   ascorbic acid (VITAMIN C) 250 mg tablet 12/20/2022  Yes Yes   Sig: Take 250 mg by mouth daily   hydrochlorothiazide (HYDRODIURIL) 50 mg tablet 12/20/2022 at 0800  No Yes   Sig: TAKE 1 TABLET BY MOUTH EVERY DAY   latanoprost (XALATAN) 0 005 % ophthalmic solution 12/19/2022 at 2000  Yes Yes   pravastatin (PRAVACHOL) 10 mg tablet 12/19/2022 at 2000  No Yes   Sig: TAKE 1 TABLET BY MOUTH EVERYDAY AT BEDTIME   predniSONE 10 mg tablet 12/20/2022 at 0800  No Yes   Sig: Take 5 tablets (50 mg total) by mouth daily for 14 days, THEN 4 tablets (40 mg total) daily for 14 days, THEN 3 tablets (30 mg total) daily for 14 days, THEN 2 tablets (20 mg total) daily for 14 days, THEN 1 tablet (10 mg total) daily for 28 days  Facility-Administered Medications: None       Past Medical History:   Diagnosis Date   • Diabetes mellitus ([facility] )    • Hemorrhoids    • Hyperlipidemia    • Hypertension    • ovarian    • Pseudopolyposis of colon without complication, unspecified part of colon [facility] ) 07/01/2022       Past Surgical History:   Procedure Laterality Date   • BREAST BIOPSY      Bx breast percutan needle core use image guide (stereotactic)   • CATARACT EXTRACTION     • COLONOSCOPY     • HEMORROIDECTOMY     • MT TEMPORAL ARTERY LIGATN OR BX Bilateral 10/17/2022    Procedure: BIOPSY ARTERY TEMPORAL;  Surgeon: Asia Rodriguez DO;  Location: [facility];  Service: Vascular   • TOTAL ABDOMINAL HYSTERECTOMY         Family History   Problem Relation Age of Onset   • Heart disease Mother    • Lung cancer Mother    • Uterine cancer Mother    • Heart disease Father    • Breast cancer Sister    • Diabetes Maternal Grandmother      I have reviewed and agree with the history as documented      E-Cigarette/Vaping     E-Cigarette/Vaping Substances   • Nicotine No    • THC No    • CBD No      Social History     Tobacco Use   • Smoking status: Never   • Smokeless tobacco: Never   Substance Use Topics   • Alcohol use: Not Currently     Comment: rarely   • Drug use: Never        Review of Systems   Constitutional: Negative for chills and fever  HENT: Negative for ear pain and sore throat  Eyes: Positive for visual disturbance  Negative for pain  Respiratory: Negative for cough and shortness of breath  Cardiovascular: Positive for palpitations  Negative for chest pain  Gastrointestinal: Negative for abdominal pain, nausea and vomiting  Genitourinary: Positive for hematuria  Negative for dysuria  Musculoskeletal: Negative for arthralgias and back pain  Skin: Negative for color change and rash  Neurological: Positive for weakness  Negative for seizures  All other systems reviewed and are negative  Physical Exam  ED Triage Vitals [12/20/22 1141]   Temperature Pulse Respirations Blood Pressure SpO2   98 °F (36 7 °C) 87 16 (!) 183/97 96 %      Temp Source Heart Rate Source Patient Position - Orthostatic VS BP Location FiO2 (%)   Oral Monitor Lying Left arm --      Pain Score       No Pain             Orthostatic Vital Signs  Vitals:    12/20/22 1141 12/20/22 1351   BP: (!) 183/97 158/73   Pulse: 87 85   Patient Position - Orthostatic VS: Lying        Physical Exam  Vitals and nursing note reviewed  Constitutional:       General: She is not in acute distress  Appearance: She is well-developed  HENT:      Head: Normocephalic and atraumatic  Nose: No congestion or rhinorrhea  Eyes:      Extraocular Movements: Extraocular movements intact  Conjunctiva/sclera: Conjunctivae normal       Pupils: Pupils are equal, round, and reactive to light  Cardiovascular:      Rate and Rhythm: Normal rate and regular rhythm  Heart sounds: No murmur heard  Pulmonary:      Effort: Pulmonary effort is normal  No respiratory distress  Breath sounds: Normal breath sounds     Abdominal:      Palpations: Abdomen is soft  Tenderness: There is no abdominal tenderness  Musculoskeletal:         General: No swelling  Cervical back: Neck supple  No rigidity  Right lower leg: Edema present  Left lower leg: Edema present  Comments: +1 pitting edema BLE   Skin:     General: Skin is warm and dry  Capillary Refill: Capillary refill takes less than 2 seconds  Neurological:      Mental Status: She is alert and oriented to person, place, and time  Sensory: No sensory deficit  Motor: No weakness  Comments: Strength 5/5 BUE and BLE  Sensation intact bilaterally  No ataxia with finger to nose  Psychiatric:         Mood and Affect: Mood normal          ED Medications  Medications   sodium chloride (PF) 0 9 % injection 3 mL (has no administration in time range)   ceftriaxone (ROCEPHIN) 1 g/50 mL in dextrose IVPB (1,000 mg Intravenous New Bag 12/20/22 1545)   sodium chloride 0 9 % bolus 1,000 mL (0 mL Intravenous Stopped 12/20/22 1545)       Diagnostic Studies  Results Reviewed     Procedure Component Value Units Date/Time    HS Troponin I 2hr [[id number]]  (Normal) Collected: 12/20/22 1435    Lab Status: Final result Specimen: Blood from Arm, Right Updated: 12/20/22 1544     hs TnI 2hr 37 ng/L      Delta 2hr hsTnI 4 ng/L     D-dimer, quantitative [[id number]] Collected: 12/20/22 1435    Lab Status:  In process Specimen: Blood from Arm, Right Updated: 12/20/22 1513    Manual Differential(PHLEBS Do Not Order) [[id number]]  (Abnormal) Collected: 12/20/22 1229    Lab Status: Final result Specimen: Blood from Arm, Right Updated: 12/20/22 1456     Segmented % 86 %      Bands % 1 %      Lymphocytes % 6 %      Monocytes % 7 %      Eosinophils, % 0 %      Basophils % 0 %      Absolute Neutrophils 4 59 Thousand/uL      Lymphocytes Absolute 0 32 Thousand/uL      Monocytes Absolute 0 37 Thousand/uL      Eosinophils Absolute 0 00 Thousand/uL      Basophils Absolute 0 00 Thousand/uL      Total Counted --     Anisocytosis Present     Ovalocytes Present     Platelet Estimate Adequate    Urine culture [[id number]] Collected: 12/20/22 1228    Lab Status:  In process Specimen: Urine, Other Updated: 12/20/22 1436    Urine Microscopic [[id number]]  (Abnormal) Collected: 12/20/22 1228    Lab Status: Final result Specimen: Urine, Other Updated: 12/20/22 1429     RBC, UA 30-50 /hpf      WBC, UA None Seen /hpf      Epithelial Cells None Seen /hpf      Bacteria, UA Occasional /hpf     HS Troponin I 4hr [[id number]]     Lab Status: No result Specimen: Blood     HS Troponin 0hr (reflex protocol) [[id number]]  (Normal) Collected: 12/20/22 1229    Lab Status: Final result Specimen: Blood from Arm, Right Updated: 12/20/22 1314     hs TnI 0hr 33 ng/L     Comprehensive metabolic panel [[id number]]  (Abnormal) Collected: 12/20/22 1229    Lab Status: Final result Specimen: Blood from Arm, Right Updated: 12/20/22 1303     Sodium 139 mmol/L      Potassium 3 3 mmol/L      Chloride 100 mmol/L      CO2 30 mmol/L      ANION GAP 9 mmol/L      BUN 19 mg/dL      Creatinine 0 72 mg/dL      Glucose 126 mg/dL      Calcium 9 7 mg/dL      Corrected Calcium 10 6 mg/dL      AST 18 U/L      ALT 50 U/L      Alkaline Phosphatase 50 U/L      Total Protein 6 2 g/dL      Albumin 2 9 g/dL      Total Bilirubin 1 29 mg/dL      eGFR 78 ml/min/1 73sq m     Narrative:      [place] guidelines for Chronic Kidney Disease (CKD):   •  Stage 1 with normal or high GFR (GFR > 90 mL/min/1 73 square meters)  •  Stage 2 Mild CKD (GFR = 60-89 mL/min/1 73 square meters)  •  Stage 3A Moderate CKD (GFR = 45-59 mL/min/1 73 square meters)  •  Stage 3B Moderate CKD (GFR = 30-44 mL/min/1 73 square meters)  •  Stage 4 Severe CKD (GFR = 15-29 mL/min/1 73 square meters)  •  Stage 5 End Stage CKD (GFR <15 mL/min/1 73 square meters)  Note: GFR calculation is accurate only with a steady state creatinine    UA w Reflex to Microscopic w Reflex to Culture [[id number]] (Abnormal) Collected: 12/20/22 1228    Lab Status: Final result Specimen: Urine, Other Updated: 12/20/22 1259     Color, UA Yellow     Clarity, UA Slightly Cloudy     Specific [place], UA 1 020     pH, UA 7 5     Leukocytes, UA Negative     Nitrite, UA Negative     Protein, UA Negative mg/dl      Glucose, UA Negative mg/dl      Ketones, UA Negative mg/dl      Urobilinogen, UA 1 0 E U /dl      Bilirubin, UA Negative     Occult Blood, UA Large    CBC and differential [[id number]]  (Abnormal) Collected: 12/20/22 1229    Lab Status: Final result Specimen: Blood from Arm, Right Updated: 12/20/22 1250     WBC 5 28 Thousand/uL      RBC 4 19 Million/uL      Hemoglobin 12 3 g/dL      Hematocrit 36 4 %      MCV 87 fL      MCH 29 4 pg      MCHC 33 8 g/dL      RDW 16 0 %      MPV 9 6 fL      Platelets 106 Thousands/uL     Narrative: This is an appended report  These results have been appended to a previously verified report  CT head without contrast   Final Result by Twan Farooq MD (12/20 1253)   Addendum (preliminary) 1 of 1 by Twan Farooq MD (12/20 1253)   ADDENDUM:      Trace gas fluid level in the right maxillary sinus attributed to dependent    drainage or sinusitis  Final      No acute intracranial process  No skull fracture  Chronic microangiopathy  Workstation performed: [id number]         X-ray chest 2 views    (Results Pending)         Procedures  Procedures      ED Course  ED Course as of 12/20/22 1548   Tue Dec 20, 2022   1310 Potassium(!): 3 3   1311 Leukocytes, UA: Negative   1311 Nitrite, UA: Negative   1311 Blood, UA(!): Large   1311 CT head shows: No acute intracranial process    No skull fracture  Chronic microangiopathy  1329 hs TnI 0hr: 33   1330 Patients HR ranges from 80's-130's  Cardiology consulted      65 Reached out to [facility] to admit    1426 Reached out to cardiology who stated that rhythm strip looks like sinus tachycardia with PACs  Contacted [facility] who said we could cancel the cardiology consult  MDM  Number of Diagnoses or Management Options  Dysrhythmia  Hematuria  Near syncope  Weakness  Diagnosis management comments: Patient is an 72-year-old female with a past medical history of diabetes, hypertension, and temporal arteritis who presented with weakness  Differential dx: UTI, PNA, sick sinus syndrome,dehydration, electrolyte abnormality     A cardiac workup including EKG, CBC, CMP, and troponins will be ordered  CXR will be ordered to rule out pneumonia  While in the patient's room, her HR fluctuated between 80's-130s  Patient's rhythm strip showed abnormal rhythm with possible PACs  There is concern for sick sinus syndrome so cardiology was consulted  CXR and UA were unremarkable  Cardiology stated that her rhythm looked like sinus tachycardia with PACs  She will be admitted to the medicine team for further evaluation  Disposition  Final diagnoses:   Dysrhythmia   Weakness   Hematuria     Time reflects when diagnosis was documented in both MDM as applicable and the Disposition within this note     Time User Action Codes Description Comment    12/20/2022  1:45 PM Gay Bert Add [I49 9] Dysrhythmia     12/20/2022  2:33 PM Gay Bert Add [R53 1] Weakness     12/20/2022  2:33 PM Gay Bert Modify [I49 9] Dysrhythmia     12/20/2022  2:33 PM Gay Bert Modify [R53 1] Weakness     12/20/2022  2:33 PM Gay Bert Add [R55] Near syncope     12/20/2022  2:33 PM Gay Bert Add [R31 9] Hematuria     12/20/2022  3:46 PM Gay Bert Remove [R55] Near syncope       ED Disposition     ED Disposition   Admit    Condition   Stable    Date/Time   Tue Dec 20, 2022  2:33 PM    Comment   Case was discussed with FREDDIE and the patient's admission status was agreed to be Admission Status: inpatient status to the service of Dr Dotty Babin              Follow-up Information    None         Patient's Medications   Discharge Prescriptions    No medications on file     No discharge procedures on file  PDMP Review       Value Time User    PDMP Reviewed  Yes 10/27/2022  3:12 PM Taylor Blanton MD           ED Provider  Attending physically available and evaluated Emily Way I managed the patient along with the ED Attending      Electronically Signed by         Gumaro Adames MD  12/20/22 8675

## 2022-12-20 NOTE — ASSESSMENT & PLAN NOTE
4 weeks of progressive weakness with a controlled fall in the bathroom today which she was lowered to the floor  Difficulty ambulating after the fall   No loc and did not hit her head  Possibly due to uti with recent hematuria  Will start on ceftriaxone and await urine culture  Consult pt/ot

## 2022-12-20 NOTE — PATIENT INSTRUCTIONS
Assessment/plan:  1  Generalized weakness-possibly multifactorial   Patient does have a history of temporal arteritis and is currently on higher doses of prednisone therapy  2   Tachycardia-EKG performed in the office  Sinus tachycardia with possible lateral wall ischemia  3   Fall at home-patient does have increased fall risk with her weakness  She is requiring assistance to get out of chair  4   Mental status change- states patient does not seem quite herself and has had difficulty with cognitive tasks  5   Hematuria-patient did have episode of dark red blood in the urine ,  Will need further testing, rule out infection/sepsis  Given all of these more acute findings I am concerned about sepsis, underlying infection, and would recommend complete evaluation in the hospital setting  She does present a significant fall risk at home  Patient and  prefer to go via EMS

## 2022-12-20 NOTE — ASSESSMENT & PLAN NOTE
Hold HCTZ, started low dose metoprolol 12 5mg bid - will increase to 25 mg BID, patient tolerating and still persistently tachycardic

## 2022-12-20 NOTE — H&P
[address] [facility]  H&P- [employer] 1940, 81 y o  female MRN: [id number]  Unit/Bed#: ED 31 Encounter: [id number]  Primary Care Provider: Tete Shaw PA-C   Date and time admitted to hospital: 12/20/2022 11:22 AM    Essential hypertension  Assessment & Plan  Hold hctz and start low dose metoprolol 12 5mg bid    * Ambulatory dysfunction  Assessment & Plan  4 weeks of progressive weakness with a controlled fall in the bathroom today which she was lowered to the floor  Difficulty ambulating after the fall  No loc and did not hit her head  Possibly due to uti with recent hematuria  Will start on ceftriaxone and await urine culture  Consult pt/ot    Type 2 diabetes mellitus with ophthalmic manifestations [facility])  Assessment & Plan  Lab Results   Component Value Date    HGBA1C 6 3 10/27/2022       No results for input(s): POCGLU in the last 72 hours  Blood Sugar Average: Last 72 hrs: Had previously been on metformin which has been d/garrett  Will write for insulin sliding scale     Memory changes  Assessment & Plan  Possibly early acute encephalopathy due to uti vs steroid induced vs early dementia  Will need eval by geriatrics outpt after steroid taper and uti treatment  Hypokalemia  Assessment & Plan  Likely due to hctz  Will replace  Sinus tachycardia  Assessment & Plan  She has been ambulating less with right lower ext slightly more edematous then left  In which pe is on the differential  Could also be in response to uti  Pt has a contrast allergy  Will check d dimer and lower ext dopplers  Will start ceftriaxone for uti  Will check tsh  Check mg and keep electrolytes wnl  Hematuria  Assessment & Plan  Hematuria that developed over the past few days  Possibly uti  Will obtain urine culture  Start ceftriaxone    Temporal arteritis [facility])  Assessment & Plan  Biopsy proved temporal arteritis  Ultrasound had been negative  follows with Ophthalmology    On high dose prednisone which has been slowly tapered outpt      VTE Prophylaxis: Enoxaparin (Lovenox)  / sequential compression device   Code Status: full code    Anticipated Length of Stay:  Patient will be admitted on an Inpatient basis with an anticipated length of stay of  Greater than 2 midnights  Chief Complaint:   Worsening weakness with controlled fall today     History of Present Illness:    Colton Mercedes is a 81 y o  female with pmhx of diabetes, htn, temporal arteritis, and recent bronchitis dx presented with generalized weakness and controlled fall in the bathroom today  Both her and her  had bronchitis approximately 4 weeks ago  She has had progressive weakness since this dx  She also has been having decreased po intake at times and her steroids for temporal arteritis have started to be tapered  She was in the bathroom today getting ready to go to her doctors appointment when she lost her balance and her  lowered her to the floor  She did not have loc and didn't hit her head  She was sent to the ed from primary care doctors office due to hematuria and sinus tach  The hematuria started a couple of days ago in which pt reports her urine was dark  No urinary frequency, urgency or dysuria  No f/c  Cp no sob  She has been ambulating less and feels that her right leg is more swollen then her left leg  No pain in her calf  pts  is also concerned as he reports she has been forgetting things more frequently and seems to be in a "fog" for the last couple of days  covid test was negative at home  Review of Systems:    Review of Systems   Constitutional: Positive for activity change, appetite change and fatigue  Negative for chills and fever  HENT: Negative  Eyes: Negative  Respiratory: Negative for cough, shortness of breath and wheezing  Cardiovascular: Positive for leg swelling  Negative for chest pain  Gastrointestinal: Negative  Endocrine: Negative      Genitourinary: Positive for hematuria  Negative for difficulty urinating, dysuria, frequency and urgency  Musculoskeletal: Negative  Skin: Negative  Allergic/Immunologic: Negative  Neurological: Positive for light-headedness  Negative for dizziness and headaches  Hematological: Negative  Psychiatric/Behavioral: Negative  Past Medical and Surgical History:     Past Medical History:   Diagnosis Date   • Diabetes mellitus ([facility] )    • Hemorrhoids    • Hyperlipidemia    • Hypertension    • ovarian    • Pseudopolyposis of colon without complication, unspecified part of colon ([facility] ) 07/01/2022       Past Surgical History:   Procedure Laterality Date   • BREAST BIOPSY      Bx breast percutan needle core use image guide (stereotactic)   • CATARACT EXTRACTION     • COLONOSCOPY     • HEMORROIDECTOMY     • NC TEMPORAL ARTERY LIGATN OR BX Bilateral 10/17/2022    Procedure: BIOPSY ARTERY TEMPORAL;  Surgeon: Serena Hampton DO;  Location: [facility];  Service: Vascular   • TOTAL ABDOMINAL HYSTERECTOMY         Meds/Allergies:    Prior to Admission medications    Medication Sig Start Date End Date Taking?  Authorizing Provider   ALPRAZolam Roberta Paige) 0 25 mg tablet Take 1 tablet (0 25 mg total) by mouth daily as needed for anxiety 10/27/22  Yes Vicki Ruiz MD   ascorbic acid (VITAMIN C) 250 mg tablet Take 250 mg by mouth daily   Yes Historical Provider, MD   Biotin 92826 MCG TABS Take by mouth     Yes Historical Provider, MD   Cholecalciferol (VITAMIN D) 2000 units CAPS Take 1 capsule by mouth 2 (two) times a day 6/5/15  Yes Historical Provider, MD   hydrochlorothiazide (HYDRODIURIL) 50 mg tablet TAKE 1 TABLET BY MOUTH EVERY DAY 12/8/22  Yes Dayna Post PA-C   latanoprost (XALATAN) 0 005 % ophthalmic solution  12/7/22  Yes Historical Provider, MD   pravastatin (PRAVACHOL) 10 mg tablet TAKE 1 TABLET BY MOUTH EVERYDAY AT BEDTIME 8/24/22  Yes Dayna Post PA-C   predniSONE 10 mg tablet Take 5 tablets (50 mg total) by mouth daily for 14 days, THEN 4 tablets (40 mg total) daily for 14 days, THEN 3 tablets (30 mg total) daily for 14 days, THEN 2 tablets (20 mg total) daily for 14 days, THEN 1 tablet (10 mg total) daily for 28 days  11/3/22 1/26/23 Yes Samson Mtz MD   ibuprofen (MOTRIN) 600 mg tablet Take 1 tablet (600 mg total) by mouth every 6 (six) hours as needed for moderate pain  Patient not taking: Reported on 12/20/2022 10/17/22 12/20/22  Mary Clarkaggio, DO   predniSONE 10 mg tablet Take 1 tablet (10 mg total) by mouth daily Do not start before January 27, 2023 1/27/23 12/20/22  Samson Mtz MD     I have reviewed home medications with patient personally  Allergies: Allergies   Allergen Reactions   • Iodinated Diagnostic Agents Hives       Social History:     Marital Status: /Civil Union     Substance Use History:   Social History     Substance and Sexual Activity   Alcohol Use Not Currently    Comment: rarely     Social History     Tobacco Use   Smoking Status Never   Smokeless Tobacco Never     Social History     Substance and Sexual Activity   Drug Use Never       Family History:    non-contributory    Physical Exam:     Vitals:   Blood Pressure: 158/73 (12/20/22 1351)  Pulse: 85 (12/20/22 1351)  Temperature: 98 2 °F (36 8 °C) (12/20/22 1351)  Temp Source: Oral (12/20/22 1351)  Respirations: 16 (12/20/22 1351)  SpO2: 96 % (12/20/22 1351)    Physical Exam  Constitutional:       Appearance: Normal appearance  HENT:      Head: Normocephalic and atraumatic  Eyes:      Extraocular Movements: Extraocular movements intact  Pupils: Pupils are equal, round, and reactive to light  Cardiovascular:      Rate and Rhythm: Normal rate and regular rhythm  Heart sounds: No murmur heard  No friction rub  No gallop  Pulmonary:      Effort: Pulmonary effort is normal  No respiratory distress  Breath sounds: Normal breath sounds  No wheezing, rhonchi or rales     Abdominal:      General: Bowel sounds are normal  There is no distension  Palpations: Abdomen is soft  Tenderness: There is no abdominal tenderness  There is no guarding or rebound  Musculoskeletal:      Right lower leg: Edema present  Left lower leg: No edema  Neurological:      Mental Status: She is alert and oriented to person, place, and time  Additional Data:     Lab Results: I have personally reviewed pertinent reports  Results from last 7 days   Lab Units 12/20/22  1229   WBC Thousand/uL 5 28   HEMOGLOBIN g/dL 12 3   HEMATOCRIT % 36 4   PLATELETS Thousands/uL 181   LYMPHO PCT % 6*   MONO PCT % 7   EOS PCT % 0     Results from last 7 days   Lab Units 12/20/22  1229   POTASSIUM mmol/L 3 3*   CHLORIDE mmol/L 100   CO2 mmol/L 30   BUN mg/dL 19   CREATININE mg/dL 0 72   CALCIUM mg/dL 9 7   ALK PHOS U/L 50   ALT U/L 50   AST U/L 18           Imaging: I have personally reviewed pertinent reports  CT head without contrast    Addendum Date: 12/20/2022 Addendum:   ADDENDUM: Trace gas fluid level in the right maxillary sinus attributed to dependent drainage or sinusitis  Result Date: 12/20/2022  Narrative: CT BRAIN - WITHOUT CONTRAST INDICATION:   Head trauma, minor (Age >= 65y) Fall  COMPARISON:  None  TECHNIQUE:  CT examination of the brain was performed  In addition to axial images, sagittal and coronal 2D reformatted images were created and submitted for interpretation  Radiation dose length product (DLP) for this visit:  871 mGy-cm   This examination, like all CT scans performed in the [facility], was performed utilizing techniques to minimize radiation dose exposure, including the use of iterative reconstruction and automated exposure control  IMAGE QUALITY:  Diagnostic  FINDINGS: PARENCHYMA:  No intracranial mass, mass effect or midline shift  No CT signs of acute infarction  No acute parenchymal hemorrhage    Periventricular, centrum semiovale, and subcortical white matter hypodensity is a nonspecific finding likely representing  chronic microangiopathy  Calcification bilateral cavernous internal carotid and distal vertebral arteries  VENTRICLES AND EXTRA-AXIAL SPACES:  No acute hydrocephalus  Mild prominence of CSF spaces diffusely attributed to generalized volume loss  Bilateral hyperostosis frontalis  VISUALIZED ORBITS AND PARANASAL SINUSES:  Changes of bilateral lens replacements noted  Minimal mucosal thickening scattered in the paranasal sinuses  Trace gas fluid level in the right maxillary sinus  CALVARIUM AND EXTRACRANIAL SOFT TISSUES:  Bitemporal scalp surgical clips  No skull fracture  Bilateral  temporomandibular degenerative changes  Impression: No acute intracranial process  No skull fracture  Chronic microangiopathy  Workstation performed: [id number]       EKG, Pathology, and Other Studies Reviewed on Admission:   · EKG: sinus rhythm with pacs at 89bpms    [facility] Everywhere Records Reviewed:  Yes

## 2022-12-20 NOTE — PROGRESS NOTES
Name: Randy Cuenca      : 1940      MRN: [id number]  Encounter Provider: Kd Montes De Oca PA-C  Encounter Date: 2022   Encounter department: [facility]    Assessment & Plan     Patient Instructions   Assessment/plan:  1  Generalized weakness-possibly multifactorial   Patient does have a history of temporal arteritis and is currently on higher doses of prednisone therapy  2   Tachycardia-EKG performed in the office  Sinus tachycardia with possible lateral wall ischemia  3   Fall at home-patient does have increased fall risk with her weakness  She is requiring assistance to get out of chair  4   Mental status change- states patient does not seem quite herself and has had difficulty with cognitive tasks  5   Hematuria-patient did have episode of dark red blood in the urine ,  Will need further testing, rule out infection/sepsis  Given all of these more acute findings I am concerned about sepsis, underlying infection, and would recommend complete evaluation in the hospital setting  She does present a significant fall risk at home  Patient and  prefer to go via EMS  1  SOB (shortness of breath)  -     POCT ECG         Subjective      HPI: This is an 58-year-old female that presents to the office accompanied by her  she  She is seen for symptoms of weakness, generalized fatigue, change in mental status, and recent fall at home  She has had persistent symptoms since lung infection about 3 to 4 weeks ago  She did test negative for COVID at that time  She does have a history of temporal arteritis and is currently on higher doses of prednisone as well  She has not had any blood test since she has been on the prednisone but does have a history of elevated glucose levels as well  Her oxygen level in the office today is 97% but she has a resting pulse of 132  She does feel that her heart is racing quite a bit     also notes that she has had some mental status change and she has having difficulty with cognition and doing things at home  He is concerned because he is not able to keep her from falling  She has had a fall already  Review of Systems   Constitutional: Positive for fatigue  Negative for chills and fever  HENT: Negative for congestion, ear pain and sinus pressure  Eyes: Negative for visual disturbance  Respiratory: Positive for shortness of breath  Negative for cough and chest tightness  Cardiovascular: Positive for palpitations  Negative for chest pain  Gastrointestinal: Negative for diarrhea, nausea and vomiting  Endocrine: Negative for polyuria  Genitourinary: Positive for hematuria  Negative for dysuria and frequency  Musculoskeletal: Negative for arthralgias and myalgias  Skin: Negative for pallor and rash  Neurological: Positive for weakness and light-headedness  Negative for dizziness, numbness and headaches  Psychiatric/Behavioral: Negative for agitation, behavioral problems and sleep disturbance  All other systems reviewed and are negative        Current Outpatient Medications on File Prior to Visit   Medication Sig   • ALPRAZolam (XANAX) 0 25 mg tablet Take 1 tablet (0 25 mg total) by mouth daily as needed for anxiety   • ascorbic acid (VITAMIN C) 250 mg tablet Take 250 mg by mouth daily   • Biotin 36944 MCG TABS Take by mouth     • Cholecalciferol (VITAMIN D) 2000 units CAPS Take 1 capsule by mouth 2 (two) times a day   • hydrochlorothiazide (HYDRODIURIL) 50 mg tablet TAKE 1 TABLET BY MOUTH EVERY DAY   • latanoprost (XALATAN) 0 005 % ophthalmic solution    • pravastatin (PRAVACHOL) 10 mg tablet TAKE 1 TABLET BY MOUTH EVERYDAY AT BEDTIME   • predniSONE 10 mg tablet Take 5 tablets (50 mg total) by mouth daily for 14 days, THEN 4 tablets (40 mg total) daily for 14 days, THEN 3 tablets (30 mg total) daily for 14 days, THEN 2 tablets (20 mg total) daily for 14 days, THEN 1 tablet (10 mg total) daily for 28 days    • [START ON 1/27/2023] predniSONE 10 mg tablet Take 1 tablet (10 mg total) by mouth daily Do not start before January 27, 2023  • ibuprofen (MOTRIN) 600 mg tablet Take 1 tablet (600 mg total) by mouth every 6 (six) hours as needed for moderate pain (Patient not taking: Reported on 12/20/2022)       Objective     /70 (BP Location: Left arm, Patient Position: Sitting, Cuff Size: Standard)   Pulse (!) 132   Temp 97 8 °F (36 6 °C) (Tympanic)   Resp 16   Ht 5' 2" (1 575 m)   Wt 69 9 kg (154 lb)   SpO2 97%   BMI 28 17 kg/m²     Physical Exam  Vitals and nursing note reviewed  Constitutional:       General: She is not in acute distress  Appearance: She is well-developed  Comments: Appears weakened, frail, needs assist getting out of chair  HENT:      Head: Normocephalic and atraumatic  Right Ear: External ear normal       Left Ear: External ear normal       Nose: Nose normal       Mouth/Throat:      Pharynx: No oropharyngeal exudate  Eyes:      Conjunctiva/sclera: Conjunctivae normal       Pupils: Pupils are equal, round, and reactive to light  Neck:      Thyroid: No thyromegaly  Trachea: No tracheal deviation  Cardiovascular:      Rate and Rhythm: Normal rate and regular rhythm  Heart sounds: Normal heart sounds  No murmur heard  No friction rub  Pulmonary:      Effort: Pulmonary effort is normal  No respiratory distress  Breath sounds: Normal breath sounds  No wheezing or rales  Abdominal:      General: Bowel sounds are normal  There is no distension  Palpations: Abdomen is soft  Tenderness: There is no abdominal tenderness  There is no guarding or rebound  Musculoskeletal:         General: No tenderness  Normal range of motion  Cervical back: Normal range of motion and neck supple  Lymphadenopathy:      Cervical: No cervical adenopathy  Skin:     General: Skin is warm and dry  Findings: No erythema or rash     Neurological: Mental Status: She is alert and oriented to person, place, and time  Cranial Nerves: No cranial nerve deficit  Coordination: Coordination normal    Psychiatric:         Behavior: Behavior normal          Thought Content:  Thought content normal        Natan Paredes PA-C

## 2022-12-20 NOTE — ASSESSMENT & PLAN NOTE
Biopsy proved temporal arteritis  Ultrasound had been negative  follows with Ophthalmology    On high dose prednisone which has been slowly tapered outpt

## 2022-12-21 LAB
ANION GAP SERPL CALCULATED.3IONS-SCNC: 11 MMOL/L (ref 4–13)
APTT PPP: 108 SECONDS (ref 23–37)
APTT PPP: 78 SECONDS (ref 23–37)
APTT PPP: >210 SECONDS (ref 23–37)
ATRIAL RATE: 140 BPM
ATRIAL RATE: 84 BPM
BACTERIA UR CULT: NORMAL
BUN SERPL-MCNC: 15 MG/DL (ref 5–25)
CALCIUM SERPL-MCNC: 9.1 MG/DL (ref 8.3–10.1)
CHLORIDE SERPL-SCNC: 101 MMOL/L (ref 96–108)
CO2 SERPL-SCNC: 28 MMOL/L (ref 21–32)
CREAT SERPL-MCNC: 0.74 MG/DL (ref 0.6–1.3)
ERYTHROCYTE [DISTWIDTH] IN BLOOD BY AUTOMATED COUNT: 16.4 % (ref 11.6–15.1)
GFR SERPL CREATININE-BSD FRML MDRD: 76 ML/MIN/1.73SQ M
GLUCOSE SERPL-MCNC: 100 MG/DL (ref 65–140)
GLUCOSE SERPL-MCNC: 119 MG/DL (ref 65–140)
GLUCOSE SERPL-MCNC: 210 MG/DL (ref 65–140)
GLUCOSE SERPL-MCNC: 233 MG/DL (ref 65–140)
GLUCOSE SERPL-MCNC: 67 MG/DL (ref 65–140)
GLUCOSE SERPL-MCNC: 71 MG/DL (ref 65–140)
HCT VFR BLD AUTO: 32.9 % (ref 34.8–46.1)
HGB BLD-MCNC: 10.9 G/DL (ref 11.5–15.4)
MAGNESIUM SERPL-MCNC: 1.3 MG/DL (ref 1.6–2.6)
MCH RBC QN AUTO: 29 PG (ref 26.8–34.3)
MCHC RBC AUTO-ENTMCNC: 33.1 G/DL (ref 31.4–37.4)
MCV RBC AUTO: 88 FL (ref 82–98)
P AXIS: 29 DEGREES
P AXIS: 76 DEGREES
PLATELET # BLD AUTO: 173 THOUSANDS/UL (ref 149–390)
PLATELET # BLD AUTO: 178 THOUSANDS/UL (ref 149–390)
PMV BLD AUTO: 9.4 FL (ref 8.9–12.7)
PMV BLD AUTO: 9.9 FL (ref 8.9–12.7)
POTASSIUM SERPL-SCNC: 3 MMOL/L (ref 3.5–5.3)
PR INTERVAL: 134 MS
PR INTERVAL: 152 MS
QRS AXIS: 26 DEGREES
QRS AXIS: 33 DEGREES
QRSD INTERVAL: 78 MS
QRSD INTERVAL: 84 MS
QT INTERVAL: 336 MS
QT INTERVAL: 372 MS
QTC INTERVAL: 420 MS
QTC INTERVAL: 567 MS
RBC # BLD AUTO: 3.76 MILLION/UL (ref 3.81–5.12)
SODIUM SERPL-SCNC: 140 MMOL/L (ref 135–147)
T WAVE AXIS: 82 DEGREES
T WAVE AXIS: 86 DEGREES
T4 FREE SERPL-MCNC: 1.78 NG/DL (ref 0.76–1.46)
VENTRICULAR RATE: 140 BPM
VENTRICULAR RATE: 94 BPM
WBC # BLD AUTO: 4.92 THOUSAND/UL (ref 4.31–10.16)

## 2022-12-21 RX ORDER — POTASSIUM CHLORIDE 20 MEQ/1
40 TABLET, EXTENDED RELEASE ORAL EVERY 4 HOURS
Status: COMPLETED | OUTPATIENT
Start: 2022-12-21 | End: 2022-12-21

## 2022-12-21 RX ORDER — MAGNESIUM SULFATE HEPTAHYDRATE 40 MG/ML
4 INJECTION, SOLUTION INTRAVENOUS ONCE
Status: COMPLETED | OUTPATIENT
Start: 2022-12-21 | End: 2022-12-21

## 2022-12-21 RX ORDER — DIPHENHYDRAMINE HCL 25 MG
50 TABLET ORAL
Status: DISCONTINUED | OUTPATIENT
Start: 2022-12-21 | End: 2022-12-23 | Stop reason: HOSPADM

## 2022-12-21 RX ORDER — METHYLPREDNISOLONE 16 MG/1
32 TABLET ORAL
Status: COMPLETED | OUTPATIENT
Start: 2022-12-21 | End: 2022-12-21

## 2022-12-21 RX ADMIN — Medication 250 MG: at 09:46

## 2022-12-21 RX ADMIN — PANTOPRAZOLE SODIUM 40 MG: 40 TABLET, DELAYED RELEASE ORAL at 05:52

## 2022-12-21 RX ADMIN — Medication 1000 UNITS: at 09:46

## 2022-12-21 RX ADMIN — SODIUM CHLORIDE AND POTASSIUM CHLORIDE 50 ML/HR: .9; .15 SOLUTION INTRAVENOUS at 01:06

## 2022-12-21 RX ADMIN — METHYLPREDNISOLONE 32 MG: 16 TABLET ORAL at 22:44

## 2022-12-21 RX ADMIN — LATANOPROST 1 DROP: 50 SOLUTION OPHTHALMIC at 01:05

## 2022-12-21 RX ADMIN — SODIUM CHLORIDE AND POTASSIUM CHLORIDE 50 ML/HR: .9; .15 SOLUTION INTRAVENOUS at 14:26

## 2022-12-21 RX ADMIN — CEFTRIAXONE 1000 MG: 1 INJECTION, POWDER, FOR SOLUTION INTRAMUSCULAR; INTRAVENOUS at 16:10

## 2022-12-21 RX ADMIN — LATANOPROST 1 DROP: 50 SOLUTION OPHTHALMIC at 21:06

## 2022-12-21 RX ADMIN — Medication 12.5 MG: at 09:46

## 2022-12-21 RX ADMIN — MAGNESIUM SULFATE HEPTAHYDRATE 4 G: 40 INJECTION, SOLUTION INTRAVENOUS at 11:54

## 2022-12-21 RX ADMIN — POTASSIUM CHLORIDE 40 MEQ: 1500 TABLET, EXTENDED RELEASE ORAL at 16:09

## 2022-12-21 RX ADMIN — METOPROLOL TARTRATE 25 MG: 25 TABLET, FILM COATED ORAL at 21:05

## 2022-12-21 RX ADMIN — PREDNISONE 20 MG: 20 TABLET ORAL at 09:46

## 2022-12-21 RX ADMIN — INSULIN LISPRO 2 UNITS: 100 INJECTION, SOLUTION INTRAVENOUS; SUBCUTANEOUS at 21:08

## 2022-12-21 RX ADMIN — PRAVASTATIN SODIUM 10 MG: 10 TABLET ORAL at 01:06

## 2022-12-21 RX ADMIN — DIPHENHYDRAMINE HCL 50 MG: 25 TABLET, COATED ORAL at 23:44

## 2022-12-21 RX ADMIN — DOCUSATE SODIUM 100 MG: 100 CAPSULE, LIQUID FILLED ORAL at 17:30

## 2022-12-21 RX ADMIN — INSULIN LISPRO 1 UNITS: 100 INJECTION, SOLUTION INTRAVENOUS; SUBCUTANEOUS at 17:30

## 2022-12-21 RX ADMIN — METHYLPREDNISOLONE 32 MG: 16 TABLET ORAL at 12:47

## 2022-12-21 RX ADMIN — DOCUSATE SODIUM 100 MG: 100 CAPSULE, LIQUID FILLED ORAL at 09:46

## 2022-12-21 RX ADMIN — HEPARIN SODIUM 13 UNITS/KG/HR: 10000 INJECTION, SOLUTION INTRAVENOUS at 20:52

## 2022-12-21 RX ADMIN — PRAVASTATIN SODIUM 10 MG: 10 TABLET ORAL at 21:05

## 2022-12-21 RX ADMIN — POTASSIUM CHLORIDE 40 MEQ: 1500 TABLET, EXTENDED RELEASE ORAL at 12:03

## 2022-12-21 NOTE — ASSESSMENT & PLAN NOTE
· She has been ambulating less with right lower ext slightly more edematous then left  In which pe is on the differential  · Could also be in response to UTI  · Pt has a contrast allergy    D-dimer is elevated with bilateral lower extremity Dopplers negative for DVT  · We will start contrast allergy prep for CTA of the chest to rule out PE  · Continue empiric heparin drip  · Continue metoprolol, increase to 25 mg twice daily  · TSH low normal, reflex to free T4  · Continue telemetry monitoring

## 2022-12-21 NOTE — Clinical Note
ASSESSMENT: Pt  81 y  o female presented w/ generalized weakness & controlled fall in the bathroom  Pt admitted for Ambulatory dysfunction w/ Dysrhythmia [I49 9]  Weakness [R53 1]  Hematuria [R31 9]  S/p {DRSURGICALPROCEDURES:92180}  Pt referred to PT for mobility assessment & D/C planning w/ orders of {DRactivityorder:17044}  Please see above for information re: home set-up & PLOF as well as objective findings during PT assessment  PTA, pt reports being {DRPLOF:80333}  On eval, pt functioning below baseline hence will continue skilled PT to improve function & safety  Pt require *** + cues for techniques & safety  Gait deviations as above, *** but no gross LOB noted  Self Selected Walking Speed Test Score *** m/sec, indicating that pt is currently a {dwalkingspeedindicator:72718}  The patient's AM-PAC Basic Mobility Inpatient Short Form Raw Score is 19  A Raw score of {greater than/less than or equal to:93569} 16 suggests the patient may benefit from discharge to {home/post-acute rehab services:18638}  Please also refer to the recommendation of the Physical Therapist for safe discharge planning  From PT standpoint, {DRDCREC:01817}  No SOB & dizziness reported t/o session  Nsg staff most recent vital signs as follows: /81   Pulse (!) 129   Temp 98 7 °F (37 1 °C)   Resp 16   SpO2 93%   At end of session, pt {DRENDOFSESSION:81409}  Fall precautions reinforced w/ good understanding  CM to follow  Nsg staff to continue to mobilized pt (OOB in chair for all meals & ambulate in room/unit) as tolerated to prevent further decline in function  Nsg notified  Co-eval was necessary to complete this PT eval for the pt's best interest given pt's medical acuity & complexity  PT standpoint, given pt's dementia, it will be more beneficial for pt to return to previous facility w/ rehab services when medically cleared  Pt may need STR, if facility unable to take pt back at her current level of function       On eval, pt demonstrate dec mobility, balance, endurance & amb  Pt require {DRLEVELOFASSIST:07196} for most functional mobility + cues for techniques  Gait deviations as above, *** but no gross LOB noted  Pt instructed on general energy conservation techniques, activity pacing, breathing techniques & easing back to normal activity w/ good understanding  with impairments and limitations including weakness, impaired balance, decreased endurance, gait deviations, decreased functional mobility tolerance and fall risk  Pt's clinical presentation is currently unstable/unpredictable seen in pt's presentation    despite AM-PAC score, given above deficits & safety concerns, will recommend inpt rehab at D/C

## 2022-12-21 NOTE — CASE MANAGEMENT
Case Management Assessment & Discharge Planning Note    Patient name Halie Gleason  Location [place] 2 /[place] 2 Shamir Vazquez* MRN [id number]  : 1940 Date 2022       Current Admission Date: 2022  Current Admission Diagnosis:Ambulatory dysfunction   Patient Active Problem List    Diagnosis Date Noted   • Ambulatory dysfunction 2022   • Hematuria 2022   • Sinus tachycardia 2022   • Hypokalemia 2022   • Memory changes 2022   • Hammertoes of both feet 10/27/2022   • Onychomycosis 10/27/2022   • Constipation 10/27/2022   • Anxiety 10/27/2022   • Temporal arteritis ([facility] ) 10/12/2022   • Pseudopolyposis of colon without complication, unspecified part of colon ([facility] ) 2022   • Hypercalcemia 2021   • Type 2 diabetes mellitus with ophthalmic manifestations ([facility] ) 2021   • 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 2021   • Gross hematuria 2021   • Atrophic vaginitis 2020   • Murmur, cardiac 2020   • Primary osteoarthritis of right knee 2019   • Chronic fatigue 10/16/2018   • Mixed hyperlipidemia 10/21/2016   • Vitamin D deficiency 2013   • Osteopenia 10/28/2013   • Soft tissue neoplasm 2013   • Synovial cyst 2013   • Allergic rhinitis 2012   • Alopecia 2012   • Essential hypertension 2012      LOS (days): 1  Geometric Mean LOS (GMLOS) (days): 3 00  Days to GMLOS:2 1     OBJECTIVE:    Risk of Unplanned Readmission Score: 13 15         Current admission status: Inpatient       Preferred Pharmacy:   [facility]/pharmacy #[id number]- FABI ROBERT [address] [place] [address]  Phone: [phone] Fax: [employer] [facility], [address] [address]  Phone: [phone] Fax: [phone]    Primary Care Provider: Flo Adkins PA-C    Primary Insurance: [facility] REP  Secondary Insurance:     ASSESSMENT:  [address], [address] - Spouse   Primary Phone: [phone] (Home)  Mobile Phone: [phone]               Advance Directives  Does patient have a [address]?: Yes ()  Does patient have Advance Directives?: Yes  Advance Directives: Living will, Power of  for health care  Primary Contact: Edis Hsieh) [phone]         Readmission Root Cause  30 Day Readmission: No    Patient Information  Admitted from[de-identified] Home  Mental Status: Alert  During Assessment patient was accompanied by: Spouse  Assessment information provided by[de-identified] Patient, Spouse  Primary Caregiver: Self  Support Systems: Spouse/significant other  [place] of Residence: [facility] [employer] [place] [facility] do you live in?: [address] entry access options   Select all that apply : Stairs  Number of steps to enter home : 3  Do the steps have railings?: Yes  Type of Current Residence: [place]  In the last 12 months, was there a time when you were not able to pay the mortgage or rent on time?: No  In the last 12 months, how many places have you lived?: 1  In the last 12 months, was there a time when you did not have a steady place to sleep or slept in a shelter (including now)?: No  Homeless/housing insecurity resource given?: N/A  Living Arrangements: Lives w/ Spouse/significant other  Is patient a ?: No    Activities of Daily Living Prior to Admission  Functional Status: Independent  Completes ADLs independently?: Yes  Ambulates independently?: Yes  Does patient use assisted devices?: Yes  Assisted Devices (DME) used: Straight Cane  Does patient currently own DME?: Yes  What DME does the patient currently own?: Bedside [employer], [place]  Does patient have a history of Outpatient Therapy (PT/OT)?: No  Does the patient have a history of Short-Term Rehab?: No  Does patient have a history of HHC?: No  Does patient currently have [place] [id number]?: No         Patient Information Continued  Income Source: SSI/SSD  Does patient have prescription coverage?: Yes  Within the past 12 months, you worried that your food would run out before you got the money to buy more : Never true  Within the past 12 months, the food you bought just didn't last and you didn't have money to get more : Never true  Food insecurity resource given?: N/A  Does patient receive dialysis treatments?: No  Does patient have a history of substance abuse?: No  Does patient have a history of Mental Health Diagnosis?: Yes (anxiety)  Is patient receiving treatment for mental health?: Yes (medication management)  Has patient received inpatient treatment related to mental health in the last 2 years?: No         Means of Transportation  Means of Transport to Appts[de-identified] Drives Self  In the past 12 months, has lack of transportation kept you from medical appointments or from getting medications?: No  In the past 12 months, has lack of transportation kept you from meetings, work, or from getting things needed for daily living?: No  Was application for public transport provided?: N/A        DISCHARGE DETAILS:    Discharge planning discussed with[de-identified] Patient and spouse  Freedom of Choice: Yes  Comments - Freedom of Choice: Pt and spouse agreeable to blanket referrals for [address]  CM contacted family/caregiver?: Yes (spouse)  Were Treatment Team discharge recommendations reviewed with patient/caregiver?: Yes  Did patient/caregiver verbalize understanding of patient care needs?: Yes  Were patient/caregiver advised of the risks associated with not following Treatment Team discharge recommendations?: Yes    Contacts  Patient Contacts: Walter Shell -   Relationship to Patient[de-identified] Family  Contact Method:  In Person  Reason/Outcome: Emergency Contact, Discharge [address]         Is the patient interested in [address] at discharge?: Yes  [address] requested[de-identified] Occupational Therapy, Physical Therapy  Home Health Agency Name[de-identified] Other (referrals pending)  [address] Provider[de-identified] PCP  Home Health Services Needed[de-identified] Evaluate Functional Status and Safety, Gait/ADL Training, Strengthening/Theraputic Exercises to Improve Function  Homebound Criteria Met[de-identified] Uses an Assist Device (i e  cane, walker, etc), Requires the Assistance of Another Person for Safe Ambulation or to Leave the Home  Supporting Clincal Findings[de-identified] Fatigues Easliy in [employer], Limited Endurance    DME Referral Provided  Referral made for DME?: No    Other Referral/Resources/Interventions Provided:  Interventions: [facility]  Referral Comments: [place] referrals sent via Aidin    Would you like to participate in our [address] service program?  : No - Declined    Treatment Team Recommendation: Home with [facility]  Discharge Destination Plan[de-identified] Home with [place] at Discharge : Family ()                                      Additional Comments: CM met with pt and  Woodrow Ding at bedside  CM reviewed recommendation for [facility] and pt and spouse were agreeable to blanket referrals  CM submitted referrals via Aidin and will review choice list with pt/spouse later in the day  CM dept to follow

## 2022-12-21 NOTE — UTILIZATION REVIEW
Initial Clinical Review    Admission: Date/Time/Statement:   Admission Orders (From admission, onward)     Ordered        12/20/22 1435  [address] [facility] [place] [place]  Once                      Orders Placed This Encounter   Procedures   • INPATIENT ADMISSION     Standing Status:   Standing     Number of Occurrences:   1     Order Specific Question:   Level of Care     Answer:   Med Surg [16]     Order Specific Question:   Estimated length of stay     Answer:   More than 2 Midnights     Order Specific Question:   Certification     Answer:   I certify that inpatient services are medically necessary for this patient for a duration of greater than two midnights  See H&P and MD Progress Notes for additional information about the patient's course of treatment  ED Arrival Information     Expected   -    Arrival   12/20/2022 11:22    Acuity   Urgent            Means of arrival   Ambulance    Escorted by   [facility]    Service   Hospitalist    Admission type   Emergency            Arrival complaint   weakness           Chief Complaint   Patient presents with   • Weakness - Generalized     Patient brought by EMS from PCP due to patient with tachycardic 's  Patient states she was being seen today at PCP due to generalized weakness for several weeks  Patient reports she had a controlled fall in bathroom today, landed on carpet  Denies injury from fall and states "I just lost my balance getting up"  Denies head strike, LOC, thinners  No pain  Initial Presentation: 81 y o  female presents to ED via  EMS  From  PCP office  after a  Controlled fall in her bathroom the day of admission, has generalized weakness  Lowered to ground  By , did not hit head and  No LOC  Patient and her   had bronchitis about  4 weeks  Prior to this  Has been progressively weak  Since  Has poor po intake  And  On steroid taper for temporal arteritis  Sent to ED  By PCP due to ST and hematuria    Hematuria started a few days  Prior to admission, patient  States her urine was dark  Has  Been ambulating less, feels  Her right leg more swollen than left   states she  Has been forgetting things,  Seems  To be in a  "fog" for past few days  Home  COVID  Test negative  PMH  Is   DM2,  Essential hypertension  Admit  Ip with  Ambulatory dysfunction,  Essential hypertension, hypokalemia and memory changes and plan is  PT/OT, monitor labs, urine culture,  FELI, replace electrolytes as needed,  And continue home meds  Venous doppler positive for subacute dvt in 1 out of 2 peroneal veins  Will start ac  Will hold on cta due to allergy to contrast and no change in management  Will start heparin gtt due to hematuria  Will need to monitor  If hematuria worsens consider ivc filter and vq scan      Date: 12/21        Day 2:   Continue IV heparin, FELI and IVF  Continue  PT/OT      ED Triage Vitals [12/20/22 1141]   Temperature Pulse Respirations Blood Pressure SpO2   98 °F (36 7 °C) 87 16 (!) 183/97 96 %      Temp Source Heart Rate Source Patient Position - Orthostatic VS BP Location FiO2 (%)   Oral Monitor Lying Left arm --      Pain Score       No Pain          Wt Readings from Last 1 Encounters:   12/20/22 69 9 kg (154 lb)     Additional Vital Signs:   -- 129 Abnormal  16 152/81 105 93 % -- --    12/21/22 0747 -- -- -- -- -- -- None (Room air) --   12/21/22 07:11:54 98 7 °F (37 1 °C) 85 16 153/62 92 94 % -- --   12/20/22 20:38:27 -- 95 -- 154/62 93 94 % -- --   12/20/22 2011 97 2 °F (36 2 °C) Abnormal  101 16 156/64 -- 95 % None (Room air) Lying   12/20/22 1914 -- 79 -- 169/73 -- 97 % None (Room air) Lying   12/20/22 1351 98 2 °F (36 8 °C) 85 16 158/73 -- 96 % -- --   12/20/22 1205 -- -- -- -- -- -- None (Room air) --   12/20/22 1141 98 °F (36 7 °C) 87 16 183/97 Abnormal  -- 96 % None (Room air) Lying       Pertinent Labs/Diagnostic Test Results:   VAS lower limb venous duplex study, complete bilateral   Final Result by Evita Nevarez Annel Gordon MD (12/20 2304)      X-ray chest 2 views   Final Result by Frantz Og MD (12/20 2039)      No acute cardiopulmonary disease  Workstation performed: [id number]         CT head without contrast   Final Result by Zohra Goetz MD (12/20 1253)   Addendum (preliminary) 1 of 1 by Zohra Goetz MD (12/20 1253)   ADDENDUM:      Trace gas fluid level in the right maxillary sinus attributed to dependent    drainage or sinusitis  Final      No acute intracranial process  No skull fracture  Chronic microangiopathy                                      Workstation performed: [id number]         CTA chest pe study    (Results Pending)     Results from last 7 days   Lab Units 12/20/22  1836   SARS-COV-2  Negative     Results from last 7 days   Lab Units 12/21/22  0910 12/21/22  0823 12/20/22  1229   WBC Thousand/uL  --  4 92 5 28   HEMOGLOBIN g/dL  --  10 9* 12 3   HEMATOCRIT %  --  32 9* 36 4   PLATELETS Thousands/uL 178 173 181   BANDS PCT %  --   --  1         Results from last 7 days   Lab Units 12/21/22  0805 12/20/22  1229   SODIUM mmol/L 140 139   POTASSIUM mmol/L 3 0* 3 3*   CHLORIDE mmol/L 101 100   CO2 mmol/L 28 30   ANION GAP mmol/L 11 9   BUN mg/dL 15 19   CREATININE mg/dL 0 74 0 72   EGFR ml/min/1 73sq m 76 78   CALCIUM mg/dL 9 1 9 7   MAGNESIUM mg/dL 1 3*  --      Results from last 7 days   Lab Units 12/20/22  1229   AST U/L 18   ALT U/L 50   ALK PHOS U/L 50   TOTAL PROTEIN g/dL 6 2*   ALBUMIN g/dL 2 9*   TOTAL BILIRUBIN mg/dL 1 29*     Results from last 7 days   Lab Units 12/21/22  1103 12/21/22  0710 12/21/22  0056 12/20/22  2057 12/20/22  1910   POC GLUCOSE mg/dl 119 67 100 195* 156*     Results from last 7 days   Lab Units 12/21/22  0805 12/20/22  1229   GLUCOSE RANDOM mg/dL 71 126           Results from last 7 days   Lab Units 12/20/22  1814 12/20/22  1435 12/20/22  1229   HS TNI 0HR ng/L  --   --  33   HS TNI 2HR ng/L  --  37  -- HSTNI D2 ng/L  --  4  --    HS TNI 4HR ng/L 40  --   --    HSTNI D4 ng/L 7  --   --      Results from last 7 days   Lab Units 12/20/22  1435   D-DIMER QUANTITATIVE ug/ml FEU 1 84*     Results from last 7 days   Lab Units 12/21/22  0910 12/21/22  0229 12/20/22  1814   PROTIME seconds  --   --  13 6   INR   --   --  1 04   PTT seconds 108* 78* 20*     Results from last 7 days   Lab Units 12/20/22  2100   TSH 3RD GENERATON uIU/mL 0 638                   Results from last 7 days   Lab Units 12/20/22  1228   CLARITY UA  Slightly Cloudy   COLOR UA  Yellow   SPEC GRAV UA  1 020   PH UA  7 5   GLUCOSE UA mg/dl Negative   KETONES UA mg/dl Negative   BLOOD UA  Large*   PROTEIN UA mg/dl Negative   NITRITE UA  Negative   BILIRUBIN UA  Negative   UROBILINOGEN UA E U /dl 1 0   LEUKOCYTES UA  Negative   WBC UA /hpf None Seen   RBC UA /hpf 30-50*   BACTERIA UA /hpf Occasional   EPITHELIAL CELLS WET PREP /hpf None Seen     Results from last 7 days   Lab Units 12/20/22  1836   INFLUENZA A PCR  Negative   INFLUENZA B PCR  Negative   RSV PCR  Negative             ED Treatment:   Medication Administration from 12/20/2022 1122 to 12/20/2022 2011       Date/Time Order Dose Route Action Comments     12/20/2022 1545 EST sodium chloride 0 9 % bolus 1,000 mL 0 mL Intravenous Stopped --     12/20/2022 1309 EST sodium chloride 0 9 % bolus 1,000 mL 1,000 mL Intravenous New Bag --     12/20/2022 1742 EST ceftriaxone (ROCEPHIN) 1 g/50 mL in dextrose IVPB 0 mg Intravenous Stopped --     12/20/2022 1545 EST ceftriaxone (ROCEPHIN) 1 g/50 mL in dextrose IVPB 1,000 mg Intravenous New Bag --     12/20/2022 1947 EST heparin (porcine) 25,000 units in 0 45% NaCl 250 mL infusion (premix) 18 Units/kg/hr Intravenous New Bag --        Present on Admission:  • Essential hypertension  • Type 2 diabetes mellitus with ophthalmic manifestations (HCC)  • Temporal arteritis (HCC)      Admitting Diagnosis: Dysrhythmia [I49 9]  Weakness [R53 1]  Hematuria [R31 9]  Age/Sex: 81 y o  female  Admission Orders:  Scheduled Medications:  ascorbic acid, 250 mg, Oral, Daily  cefTRIAXone, 1,000 mg, Intravenous, Q24H  cholecalciferol, 1,000 Units, Oral, Daily  diphenhydrAMINE, 50 mg, Oral, 60 Min Pre-Op  docusate sodium, 100 mg, Oral, BID  insulin lispro, 1-5 Units, Subcutaneous, TID AC  insulin lispro, 1-5 Units, Subcutaneous, HS  latanoprost, 1 drop, Both Eyes, HS  magnesium sulfate, 4 g, Intravenous, Once  methylPREDNISolone, 32 mg, Oral, Q10H  metoprolol tartrate, 12 5 mg, Oral, Q12H ELIAS  pantoprazole, 40 mg, Oral, Early Morning  potassium chloride, 40 mEq, Oral, Q4H  pravastatin, 10 mg, Oral, HS  predniSONE, 20 mg, Oral, Daily      Continuous IV Infusions:  heparin (porcine), 3-30 Units/kg/hr (Order-Specific), Intravenous, Titrated  sodium chloride 0 9 % with KCl 20 mEq/L, 50 mL/hr, Intravenous, Continuous      PRN Meds:  acetaminophen, 650 mg, Oral, Q6H PRN  ALPRAZolam, 0 25 mg, Oral, Daily PRN  ondansetron, 4 mg, Intravenous, Q6H PRN  sodium chloride (PF), 3 mL, Intravenous, Q1H PRN      24  Hr tele    Network Utilization Review Department  ATTENTION: Please call with any questions or concerns to [phone] and carefully listen to the prompts so that you are directed to the right person  All voicemails are confidential   Yaritza Davis all requests for admission clinical reviews, approved or denied determinations and any other requests to dedicated fax number below belonging to the campus where the patient is receiving treatment   List of dedicated fax numbers for the Facilities:  [address] [place] [address] DENIALS (Administrative/Medical Necessity) [phone]   [address] [place] [address] (Maternity/NICU/Pediatrics) [phone]   [address] [phone]   [facility]  [phone]   [address] [place] [address], [address] [address] Kelsey Rowley [facility] 28 [address] [facility] 310 [facility] 134 185 [facility] [phone]

## 2022-12-21 NOTE — PLAN OF CARE
Problem: PHYSICAL THERAPY ADULT  Goal: Performs mobility at highest level of function for planned discharge setting  See evaluation for individualized goals  Description: Treatment/Interventions: Functional transfer training, LE strengthening/ROM, Elevations, Therapeutic exercise, Endurance training, Patient/family training, Bed mobility, Gait training, Spoke to nursing, OT  Equipment Recommended: Sussy Mejia (pt has; trial next tx session)       See flowsheet documentation for full assessment, interventions and recommendations  Note: Prognosis: Good  Problem List: Decreased strength, Decreased endurance, Impaired balance, Decreased mobility  Assessment: Pt  82 y  o female presented w/ generalized weakness & s/p controlled fall in the bathroom at home  Pt admitted for Ambulatory dysfunction w/ RLE DVT, sinus tachycardia & hematuria possibly due to UTI  Pt referred to PT for mobility assessment & D/C planning w/ orders of up w/ assistance  Please see above for information re: home set-up & PLOF as well as objective findings during PT assessment  PTA, pt reports being I w/o AD however have been using SPC for the past few days since she started feeling sick  On eval, pt functioning below baseline hence will continue skilled PT to improve function & safety  Pt require S for bed mobility & minAx1 for transfers & amb w/ hand held assistance + IV pole  Offered RW but pt requested to trial amb w/o AD  Gait deviations as above, slow w/ dec foot clearance but no gross LOB noted  The patient's AM-PAC Basic Mobility Inpatient Short Form Raw Score is 19  A Raw score of greater than 16 suggests the patient may benefit from discharge to home  Please also refer to the recommendation of the Physical Therapist for safe discharge planning  From PT standpoint, will anticipate safe return to home w/ family support when medically cleared  Will recommend HHPT & inc family support at home  No SOB & dizziness reported t/o session   Noted elevated HR from 148-158 bpm t/o session, pt asymptomatic  Nsg staff most recent vital signs as follows: /81   Pulse (!) 129   Temp 98 7 °F (37 1 °C)   Resp 16   SpO2 93%   At end of session, pt OOB in chair in stable condition, call bell & phone in reach, all lines intact  Fall precautions reinforced w/ good understanding  CM to follow  Nsg staff to continue to mobilized pt (OOB in chair for all meals & ambulate in room/unit) as tolerated to prevent further decline in function  Nsg notified  Co-eval was necessary to complete this PT eval for the pt's best interest given pt's medical acuity & complexity  PT Discharge Recommendation: Home with home health rehabilitation    See flowsheet documentation for full assessment

## 2022-12-21 NOTE — ASSESSMENT & PLAN NOTE
Lab Results   Component Value Date    HGBA1C 6 3 10/27/2022       Recent Labs     12/20/22 2057 12/21/22  0056 12/21/22  0710 12/21/22  1103   POCGLU 195* 100 67 119       Blood Sugar Average: Last 72 hrs:  (P) 127 4   Had previously been on metformin which has been d/garrett  Continue insulin sliding scale alone, blood glucose in low normal range

## 2022-12-21 NOTE — OCCUPATIONAL THERAPY NOTE
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)    Temporal arteritis (HCC)    Hematuria    Sinus tachycardia    Hypokalemia    Memory changes    Past Medical History  Past Medical History:   Diagnosis Date    Diabetes mellitus ([facility] [address] )     Hemorrhoids     Hyperlipidemia     Hypertension     ovarian     Pseudopolyposis of colon without complication, unspecified part of colon ([facility] [address] ) 07/01/2022     Past Surgical History  Past Surgical History:   Procedure Laterality Date    BREAST BIOPSY      Bx breast percutan needle core use image guide (stereotactic)    CATARACT EXTRACTION      COLONOSCOPY      HEMORROIDECTOMY      HI TEMPORAL ARTERY LIGATN OR BX Bilateral 10/17/2022    Procedure: BIOPSY ARTERY TEMPORAL;  Surgeon: Julia Pacheco DO;  Location: [facility];  Service: Vascular    TOTAL ABDOMINAL HYSTERECTOMY             12/21/22 0926   OT Last Visit   OT Visit Date 12/21/22   Note Type   Note type Evaluation   Pain Assessment   Pain Assessment Tool 0-10   Pain Score No Pain   Restrictions/Precautions   Weight Bearing Precautions Per Order No   Other Precautions Multiple lines; Fall Risk;Telemetry   Home Living   Type of Home House  [facility] home)   Home Layout One level; Laundry in basement;Stairs to enter with rails  (1+1+1 DEANGELO)   Bathroom Shower/Tub Tub/shower unit   Bathroom Toilet Standard   Bathroom Equipment Grab bars in shower; Tub transfer bench;Grab bars around toilet;Commode   Bathroom Accessibility Accessible   Home Equipment Walker;Cane   Additional Comments Pt lives with spouse in a ranch level house with 1+1+1 DEANGELO  Pt reports spouse is home and able to assist as needed  Prior Function   Level of [place] Independent with ADLs; Independent with functional mobility; Independent with IADLS Lives With Spouse   Receives Help From Family   IADLs Independent with driving; Independent with meal prep; Independent with medication management   Falls in the last 6 months 1 to 4  (1)   Vocational Retired   Comments At baseline, pt was I w/ ADLs, IADLs, and functional transfers/mobility w/o use of AD  Pt reports recent use of SPC 2* weakness  (+)   (+) fall PTA  Lifestyle   Autonomy At baseline, pt was I w/ ADLs, IADLs, and functional transfers/mobility w/o use of AD  Pt reports recent use of SPC 2* weakness  (+)   (+) fall PTA  Reciprocal Relationships Spouse   Service to Others Retired   Intrinsic Gratification Going for walks   ADL   Where [address]  [address] [place] [address] Dr Bhavin Geller 6 4  Minimal Assistance   Bed Mobility   Supine to Sit 5  Supervision   Additional items HOB elevated; Bedrails; Increased time required;Verbal cues   Sit to Supine Unable to assess   Additional Comments Pt seated OOB in chair at end of session  Call bell and phone within reach  All needs met and pt reports no further questions for OT at this time  Transfers   Sit to Stand 4  Minimal assistance   Additional items Assist x 1; Increased time required;Verbal cues   Stand to Sit 4  Minimal assistance   Additional items Assist x 1; Increased time required;Armrests; Verbal cues   Additional Comments Cues for safe technique and hand placement   Functional Mobility   Functional Mobility 4  Minimal assistance   Additional Comments Assist x1; HR: 146-158 bpm   Additional items Rolling walker   Balance   Static Sitting Good   Dynamic Sitting Fair +   Static Standing Fair -   Dynamic Standing Poor +   Ambulatory Poor +   Activity Tolerance Activity Tolerance Patient tolerated treatment well;Treatment limited secondary to medical complications (Comment)   Medical Staff Made Aware TERESA Minor   Nurse Made Aware yes, pt appropriate to be seen per RN   RUE Assessment   RUE Assessment WFL  (4-/5 throughout)   LUE Assessment   LUE Assessment WFL  (4-/5 throughout)   Hand Function   Gross Motor Coordination Functional   Fine Motor Coordination Functional   Sensation   Light Touch No apparent deficits   Proprioception   Proprioception No apparent deficits   Vision-Basic Assessment   Current Vision Wears glasses only for reading   Vision - Complex Assessment   Ocular Range of Motion Intact   Acuity Able to read clock/calendar on wall without difficulty   Psychosocial   Psychosocial (WDL) WDL   Perception   Inattention/Neglect Appears intact   Cognition   Overall Cognitive Status WFL   Arousal/Participation Alert; Cooperative   Attention Within functional limits   Orientation Level Oriented X4   Memory Within functional limits   Following Commands Follows all commands and directions without difficulty   Comments Pleasant and cooperative  Engages in conversation appropriately   Assessment   Limitation Decreased ADL status; Decreased UE strength;Decreased endurance;Decreased self-care trans;Decreased high-level ADLs   Prognosis Good   Assessment Pt is a 81 y o  female seen for OT evaluation s/p adm to [facility] on 12/20/2022 w/ Ambulatory dysfunction, Sinus tachycardia, and Hematuria  Comorbidities affecting pt’s functional performance include a significant PMH of DM, HLD, HTN, and temporal arteritis  Pt with active OT orders and activity orders for Up with assistance  Pt lives with spouse in a ranch level house with 1+1+1 DEANGELO  Pt reports spouse is home and able to assist as needed  At baseline, pt was I w/ ADLs, IADLs, and functional transfers/mobility w/o use of AD  Pt reports recent use of SPC 2* weakness  (+)   (+) fall PTA   Upon evaluation, pt currently requires Supervision for UB ADLs, Min A for LB ADLs, Min A for toileting, Supervision for bed mobility, and Min A for functional mobility/transfers 2* the following deficits impacting occupational performance: decreased strength, decreased balance and decreased tolerance  These impairments, as well at pt’s fall risk, steps to enter environment, difficulty performing ADLS and difficulty performing IADLS  limit pt’s ability to safely engage in all baseline areas of occupation  Based on the aforementioned OT evaluation, functional performance deficits, and assessments, pt has been identified as a Moderate complexity evaluation  Pt to continue to benefit from continued acute OT services during hospital stay to address defined deficits and to maximize level of functional independence in the following Occupational Performance areas: bathing/shower, toilet hygiene, dressing, medication management, health maintenance, functional mobility, community mobility, clothing management, cleaning, meal prep and household maintenance  From OT standpoint, recommend Home OT upon D/C  OT will continue to follow pt 2-3x/wk to address the following goals to  w/in 10-14 days:   Goals   Patient Goals To go home   LTG Time Frame 10-14   Long Term Goal Please refer to LTGs listed below   Plan   Treatment Interventions ADL retraining;Functional transfer training;UE strengthening/ROM; Endurance training;Patient/family training;Equipment evaluation/education; Compensatory technique education;Continued evaluation; Activityengagement; Energy conservation   Goal Expiration Date 23   OT Treatment Day 0   OT Frequency 2-3x/wk   Recommendation   OT Discharge Recommendation Home with home health rehabilitation   Additional Comments  The patient's raw score on the AM-PAC Daily Activity inpatient short form is 20, standardized score is 42 03, greater than 39 4  Patients at this level are likely to benefit from discharge to home   Please refer to the recommendation of the Occupational Therapist for safe discharge planning     AM-PAC Daily Activity Inpatient   Lower Body Dressing 3   Bathing 3   Toileting 3   Upper Body Dressing 3   Grooming 4   Eating 4   Daily Activity Raw Score 20   Daily Activity Standardized Score (Calc for Raw Score >=11) 42 03   AM-PAC Applied Cognition Inpatient   Following a Speech/Presentation 4   Understanding Ordinary Conversation 4   Taking Medications 4   Remembering Where Things Are Placed or Put Away 4   Remembering List of 4-5 Errands 4   Taking Care of Complicated Tasks 4   Applied Cognition Raw Score 24   Applied Cognition Standardized Score 62 21       GOALS    Pt will improve activity tolerance to G for min 30 min txment sessions for increase engagement in functional tasks    Pt will complete bed mobility at a Mod I level w/ G balance/safety demonstrated to decrease caregiver assistance required     Pt will complete UB dressing/self care w/ mod I using adaptive device and DME as needed     Pt will complete LB dressing/self care w/ mod I using adaptive device and DME as needed    Pt will complete toileting w/ mod I w/ G hygiene/thoroughness using DME as needed    Pt will improve functional transfers to Mod I on/off all surfaces using DME as needed w/ G balance/safety     Pt will improve functional mobility during ADL/IADL/leisure tasks to Mod I using DME as needed w/ G balance/safety     Pt will be attentive 100% of the time during ongoing cognitive assessment w/ G participation to assist w/ safe d/c planning/recommendations    Pt will demonstrate G carryover of pt/caregiver education and training as appropriate w/o cues w/ good tolerance to increase safety during functional tasks    Pt will demonstrate 100% carryover of energy conservation techniques t/o functional I/ADL/leisure tasks w/o cues s/p skilled education to increase endurance during functional tasks    Pt will increase BUE strength by 1MM grade via AROM exercises to increase independence in ADLs and transfers    Pt will verbalize 3 potential fall hazards and identify appropriate compensatory techniques to decrease fall risk in home environment     Pt will increase standing tolerance to 8-10 mins with Fair+ dynamic standing balance to increase safety during participation in ADLs       Amaury Harrington, OTR/L

## 2022-12-21 NOTE — PLAN OF CARE
Problem: PAIN - ADULT  Goal: Verbalizes/displays adequate comfort level or baseline comfort level  Description: Interventions:  - Encourage patient to monitor pain and request assistance  - Assess pain using appropriate pain scale  - Administer analgesics based on type and severity of pain and evaluate response  - Implement non-pharmacological measures as appropriate and evaluate response  - Consider cultural and social influences on pain and pain management  - Notify physician/advanced practitioner if interventions unsuccessful or patient reports new pain  12/21/2022 0841 by Stephanie Espinoza RN  Outcome: Progressing  12/21/2022 0840 by Stephanie Espinoza RN  Outcome: Progressing     Problem: INFECTION - ADULT  Goal: Absence or prevention of progression during hospitalization  Description: INTERVENTIONS:  - Assess and monitor for signs and symptoms of infection  - Monitor lab/diagnostic results  - Monitor all insertion sites, i e  indwelling lines, tubes, and drains  - Monitor endotracheal if appropriate and nasal secretions for changes in amount and color  - [place] appropriate cooling/warming therapies per order  - Administer medications as ordered  - Instruct and encourage patient and family to use good hand hygiene technique  - Identify and instruct in appropriate isolation precautions for identified infection/condition  12/21/2022 0841 by Stephanie Espinoza RN  Outcome: Progressing  12/21/2022 0840 by Stephanie Espinoza RN  Outcome: Progressing     Problem: SAFETY ADULT  Goal: Patient will remain free of falls  Description: INTERVENTIONS:  - Educate patient/family on patient safety including physical limitations  - Instruct patient to call for assistance with activity   - Consult OT/PT to assist with strengthening/mobility   - Keep Call bell within reach  - Keep bed low and locked with side rails adjusted as appropriate  - Keep care items and personal belongings within reach  - Initiate and maintain comfort rounds  - Make Fall Risk Sign visible to staff  - Offer Toileting every 2 Hours, in advance of need  - Initiate/Maintain bed alarm  - Obtain necessary fall risk management equipment: alarms   - Apply yellow socks and bracelet for high fall risk patients  - Consider moving patient to room near nurses station  12/21/2022 0841 by Shamir Hummel RN  Outcome: Progressing  12/21/2022 0840 by Shamir Hummel RN  Outcome: Progressing  Goal: Maintain or return to baseline ADL function  Description: INTERVENTIONS:  -  Assess patient's ability to carry out ADLs; assess patient's baseline for ADL function and identify physical deficits which impact ability to perform ADLs (bathing, care of mouth/teeth, toileting, grooming, dressing, etc )  - Assess/evaluate cause of self-care deficits   - Assess range of motion  - Assess patient's mobility; develop plan if impaired  - Assess patient's need for assistive devices and provide as appropriate  - Encourage maximum independence but intervene and supervise when necessary  - Involve family in performance of ADLs  - Assess for home care needs following discharge   - Consider OT consult to assist with ADL evaluation and planning for discharge  - Provide patient education as appropriate  12/21/2022 0841 by Shamir Hummel RN  Outcome: Progressing  12/21/2022 0840 by Shamir Hummel RN  Outcome: Progressing  Goal: Maintains/Returns to pre admission functional level  Description: INTERVENTIONS:  - Perform BMAT or MOVE assessment daily    - Set and communicate daily mobility goal to care team and patient/family/caregiver  - Collaborate with rehabilitation services on mobility goals if consulted  - Perform Range of Motion 4 times a day  - Reposition patient every 2 hours    - Dangle patient 3 times a day  - Stand patient 3 times a day  - Ambulate patient 3 times a day  - Out of bed to chair 3 times a day   - Out of bed for meals 3 times a day  - Out of bed for toileting  - Record patient progress and toleration of activity level   12/21/2022 0841 by Sabra Parker RN  Outcome: Progressing  12/21/2022 0840 by Sabra Parker RN  Outcome: Progressing     Problem: DISCHARGE PLANNING  Goal: Discharge to home or other facility with appropriate resources  Description: INTERVENTIONS:  - Identify barriers to discharge w/patient and caregiver  - Arrange for needed discharge resources and transportation as appropriate  - Identify discharge learning needs (meds, wound care, etc )  - Arrange for interpretive services to assist at discharge as needed  - Refer to Case Management Department for coordinating discharge planning if the patient needs post-hospital services based on physician/advanced practitioner order or complex needs related to functional status, cognitive ability, or social support system  12/21/2022 0841 by Sabra Parker RN  Outcome: Progressing  12/21/2022 0840 by Sabra Parker RN  Outcome: Progressing     Problem: Knowledge Deficit  Goal: Patient/family/caregiver demonstrates understanding of disease process, treatment plan, medications, and discharge instructions  Description: Complete learning assessment and assess knowledge base    Interventions:  - Provide teaching at level of understanding  - Provide teaching via preferred learning methods  12/21/2022 0841 by Sabra Parker RN  Outcome: Progressing  12/21/2022 0840 by Sabra Parker RN  Outcome: Progressing

## 2022-12-21 NOTE — PLAN OF CARE
Problem: PAIN - ADULT  Goal: Verbalizes/displays adequate comfort level or baseline comfort level  Description: Interventions:  - Encourage patient to monitor pain and request assistance  - Assess pain using appropriate pain scale  - Administer analgesics based on type and severity of pain and evaluate response  - Implement non-pharmacological measures as appropriate and evaluate response  - Consider cultural and social influences on pain and pain management  - Notify physician/advanced practitioner if interventions unsuccessful or patient reports new pain  Outcome: Progressing     Problem: INFECTION - ADULT  Goal: Absence or prevention of progression during hospitalization  Description: INTERVENTIONS:  - Assess and monitor for signs and symptoms of infection  - Monitor lab/diagnostic results  - Monitor all insertion sites, i e  indwelling lines, tubes, and drains  - Monitor endotracheal if appropriate and nasal secretions for changes in amount and color  - [place] appropriate cooling/warming therapies per order  - Administer medications as ordered  - Instruct and encourage patient and family to use good hand hygiene technique  - Identify and instruct in appropriate isolation precautions for identified infection/condition  Outcome: Progressing     Problem: SAFETY ADULT  Goal: Patient will remain free of falls  Description: INTERVENTIONS:  - Educate patient/family on patient safety including physical limitations  - Instruct patient to call for assistance with activity   - Consult OT/PT to assist with strengthening/mobility   - Keep Call bell within reach  - Keep bed low and locked with side rails adjusted as appropriate  - Keep care items and personal belongings within reach  - Initiate and maintain comfort rounds  - Make Fall Risk Sign visible to staff  - Offer Toileting every 2 Hours, in advance of need  - Initiate/Maintain bed alarm  - Obtain necessary fall risk management equipment: alarms   - Apply yellow socks and bracelet for high fall risk patients  - Consider moving patient to room near nurses station  Outcome: Progressing  Goal: Maintain or return to baseline ADL function  Description: INTERVENTIONS:  -  Assess patient's ability to carry out ADLs; assess patient's baseline for ADL function and identify physical deficits which impact ability to perform ADLs (bathing, care of mouth/teeth, toileting, grooming, dressing, etc )  - Assess/evaluate cause of self-care deficits   - Assess range of motion  - Assess patient's mobility; develop plan if impaired  - Assess patient's need for assistive devices and provide as appropriate  - Encourage maximum independence but intervene and supervise when necessary  - Involve family in performance of ADLs  - Assess for home care needs following discharge   - Consider OT consult to assist with ADL evaluation and planning for discharge  - Provide patient education as appropriate  Outcome: Progressing  Goal: Maintains/Returns to pre admission functional level  Description: INTERVENTIONS:  - Perform BMAT or MOVE assessment daily    - Set and communicate daily mobility goal to care team and patient/family/caregiver  - Collaborate with rehabilitation services on mobility goals if consulted  - Perform Range of Motion 4 times a day  - Reposition patient every 2 hours    - Dangle patient 3 times a day  - Stand patient 3 times a day  - Ambulate patient 3 times a day  - Out of bed to chair 3 times a day   - Out of bed for meals 3 times a day  - Out of bed for toileting  - Record patient progress and toleration of activity level   Outcome: Progressing     Problem: DISCHARGE PLANNING  Goal: Discharge to home or other facility with appropriate resources  Description: INTERVENTIONS:  - Identify barriers to discharge w/patient and caregiver  - Arrange for needed discharge resources and transportation as appropriate  - Identify discharge learning needs (meds, wound care, etc )  - Arrange for interpretive services to assist at discharge as needed  - Refer to Case Management Department for coordinating discharge planning if the patient needs post-hospital services based on physician/advanced practitioner order or complex needs related to functional status, cognitive ability, or social support system  Outcome: Progressing     Problem: Knowledge Deficit  Goal: Patient/family/caregiver demonstrates understanding of disease process, treatment plan, medications, and discharge instructions  Description: Complete learning assessment and assess knowledge base    Interventions:  - Provide teaching at level of understanding  - Provide teaching via preferred learning methods  Outcome: Progressing

## 2022-12-21 NOTE — ASSESSMENT & PLAN NOTE
Hematuria that developed over the past few days  Possibly UTI  Urine cultures pending  Continue ceftriaxone

## 2022-12-21 NOTE — PROGRESS NOTES
[address] [facility]  Progress Note Harvinder Steiner 1940, 81 y o  female MRN: [id number]  Unit/Bed#: [place] [address] [address] [id number] Encounter: [id number]  Primary Care Provider: Ronit Floyd PA-C   Date and time admitted to hospital: 12/20/2022 11:22 AM    * Ambulatory dysfunction  Assessment & Plan  4 weeks of progressive weakness with a controlled fall in the bathroom today which she was lowered to the floor  Difficulty ambulating after the fall  No loss of consciousness and did not hit her head  Possibly due to UTI with recent hematuria  Continue ceftriaxone and await urine culture  PT/OT recommending home with VNA    Sinus tachycardia  Assessment & Plan  · She has been ambulating less with right lower ext slightly more edematous then left  In which pe is on the differential  · Could also be in response to UTI  · Pt has a contrast allergy  D-dimer is elevated with bilateral lower extremity Dopplers negative for DVT  · We will start contrast allergy prep for CTA of the chest to rule out PE  · Continue empiric heparin drip  · Continue metoprolol, increase to 25 mg twice daily  · TSH low normal, reflex to free T4  · Continue telemetry monitoring    Hematuria  Assessment & Plan  Hematuria that developed over the past few days  Possibly UTI  Urine cultures pending  Continue ceftriaxone    Memory changes  Assessment & Plan  Possibly early acute encephalopathy due to uti vs steroid induced vs early dementia  Will need eval by geriatrics outpt after steroid taper and uti treatment  Hypokalemia  Assessment & Plan  Associated with hypomagnesemia  Replace and monitor electrolytes    Temporal arteritis (HCC)  Assessment & Plan  Biopsy proved temporal arteritis  Ultrasound had been negative  Follows with Ophthalmology    On high dose prednisone which has been slowly tapered outpt    Type 2 diabetes mellitus with ophthalmic manifestations [facility])  Assessment & Plan  Lab Results   Component Value Date HGBA1C 6 3 10/27/2022       Recent Labs     22  2057 22  0056 22  0710 22  1103   POCGLU 195* 100 67 119       Blood Sugar Average: Last 72 hrs:  (P) 127 4   Had previously been on metformin which has been d/garrett  Continue insulin sliding scale alone, blood glucose in low normal range    Essential hypertension  Assessment & Plan  Hold HCTZ, started low dose metoprolol 12 5mg bid - will increase to 25 mg BID, patient tolerating and still persistently tachycardic         VTE Pharmacologic Prophylaxis: VTE Score: 4 Moderate Risk (Score 3-4) - Pharmacological DVT Prophylaxis Ordered: heparin  Patient Centered Rounds: I performed bedside rounds with nursing staff today  Discussions with Specialists or Other Care Team Provider: Multidisciplinary meeting      Time Spent for Care: 30 minutes  More than 50% of total time spent on counseling and coordination of care as described above  Current Length of Stay: 1 day(s)  Current Patient Status: Inpatient   Certification Statement: The patient will continue to require additional inpatient hospital stay due to Diagnostic work-up, close monitoring  Discharge Plan: Anticipate discharge in 24-48 hrs to home with home services  Code Status: Level 1 - Full Code    Subjective:   Patient seen and examined  No significant complaints or overnight events  Objective:     Vitals:   Temp (24hrs), Av 1 °F (36 7 °C), Min:97 2 °F (36 2 °C), Max:98 7 °F (37 1 °C)    Temp:  [97 2 °F (36 2 °C)-98 7 °F (37 1 °C)] 98 5 °F (36 9 °C)  HR:  [] 87  Resp:  [16] 16  BP: (130-169)/(62-81) 130/63  SpO2:  [91 %-97 %] 91 %  There is no height or weight on file to calculate BMI  Input and Output Summary (last 24 hours):      Intake/Output Summary (Last 24 hours) at 2022 1654  Last data filed at 2022 0800  Gross per 24 hour   Intake 192 94 ml   Output --   Net 192 94 ml       Physical Exam:   Physical Exam  Constitutional:       General: She is not in acute distress  HENT:      Head: Normocephalic and atraumatic  Nose: No congestion  Eyes:      Conjunctiva/sclera: Conjunctivae normal    Cardiovascular:      Rate and Rhythm: Regular rhythm  Tachycardia present  Pulmonary:      Effort: No respiratory distress  Breath sounds: No wheezing or rales  Abdominal:      General: There is no distension  Tenderness: There is no abdominal tenderness  There is no guarding  Musculoskeletal:      Right lower leg: No edema  Left lower leg: No edema  Skin:     General: Skin is warm and dry  Neurological:      Mental Status: She is oriented to person, place, and time     Psychiatric:         Mood and Affect: Mood normal           Additional Data:     Labs:  Results from last 7 days   Lab Units 12/21/22  0910 12/21/22  0823 12/20/22  1229   WBC Thousand/uL  --  4 92 5 28   HEMOGLOBIN g/dL  --  10 9* 12 3   HEMATOCRIT %  --  32 9* 36 4   PLATELETS Thousands/uL 178 173 181   BANDS PCT %  --   --  1   LYMPHO PCT %  --   --  6*   MONO PCT %  --   --  7   EOS PCT %  --   --  0     Results from last 7 days   Lab Units 12/21/22  0805 12/20/22  1229   SODIUM mmol/L 140 139   POTASSIUM mmol/L 3 0* 3 3*   CHLORIDE mmol/L 101 100   CO2 mmol/L 28 30   BUN mg/dL 15 19   CREATININE mg/dL 0 74 0 72   ANION GAP mmol/L 11 9   CALCIUM mg/dL 9 1 9 7   ALBUMIN g/dL  --  2 9*   TOTAL BILIRUBIN mg/dL  --  1 29*   ALK PHOS U/L  --  50   ALT U/L  --  50   AST U/L  --  18   GLUCOSE RANDOM mg/dL 71 126     Results from last 7 days   Lab Units 12/20/22  1814   INR  1 04     Results from last 7 days   Lab Units 12/21/22  1642 12/21/22  1103 12/21/22  0710 12/21/22  0056 12/20/22  2057 12/20/22  1910   POC GLUCOSE mg/dl 210* 119 67 100 195* 156*               Lines/Drains:  Invasive Devices     Peripheral Intravenous Line  Duration           Peripheral IV 12/21/22 Left;Proximal;Ventral (anterior) Forearm <1 day    Peripheral IV 12/21/22 Right Antecubital <1 day    Peripheral IV 12/21/22 Right;Ventral (anterior) Forearm <1 day                  Telemetry:  Telemetry Orders (From admission, onward)             24 Hour Telemetry Monitoring  Continuous x 24 Hours (Telem)        References:    Telemetry Guidelines   Question:  Reason for 24 Hour Telemetry  Answer:  Metabolic/Electrolyte Disturbance with High Probability of Dysrhythmia (K level <3 or >6, or KCL infusion >=10mEq/hr)                 Telemetry Reviewed: Sinus Tachycardia  Indication for Continued Telemetry Use: Arrthymias requiring medical therapy             Imaging: will review CTA chest     Recent Cultures (last 7 days):         Last 24 Hours Medication List:   Current Facility-Administered Medications   Medication Dose Route Frequency Provider Last Rate   • acetaminophen  650 mg Oral Q6H PRN Yaritza Ambron, DO     • ALPRAZolam  0 25 mg Oral Daily PRN Yaritza Ambron, DO     • ascorbic acid  250 mg Oral Daily Yaritza Ambron, DO     • cefTRIAXone  1,000 mg Intravenous Q24H Yaritza Ambron, DO 1,000 mg (12/21/22 1610)   • cholecalciferol  1,000 Units Oral Daily Yaritza Ambron, DO     • diphenhydrAMINE  50 mg Oral 60 Min Pre-Op Carolin Rodriguez MD     • docusate sodium  100 mg Oral BID Yaritza Ambron, DO     • heparin (porcine)  3-30 Units/kg/hr (Order-Specific) Intravenous Titrated Yaritza Ambron, DO 16 Units/kg/hr (12/21/22 0948)   • insulin lispro  1-5 Units Subcutaneous TID AC Yaritza Ambron, DO     • insulin lispro  1-5 Units Subcutaneous HS Yaritza Ambron, DO     • latanoprost  1 drop Both Eyes HS Yaritza Ambron, DO     • methylPREDNISolone  32 mg Oral Q10H Carolin Rodriguez MD     • metoprolol tartrate  25 mg Oral Q12H [facility] Carolin Rodriguez MD     • ondansetron  4 mg Intravenous Q6H PRN Yaritza Ambron, DO     • pantoprazole  40 mg Oral Early Morning Yaritza Ambron, DO     • pravastatin  10 mg Oral HS Yaritza Ambron, DO     • predniSONE  20 mg Oral Daily Yaritza Ambron, DO     • sodium chloride (PF)  3 mL Intravenous Q1H PRN Yaritza Ambron, DO     • sodium chloride 0 9 % with KCl 20 mEq/L  50 mL/hr Intravenous Continuous Yaritza Ambron, DO 50 mL/hr (12/21/22 4836)        Today, Patient Was Seen By: Ida Tapia MD    **Please Note: This note may have been constructed using a voice recognition system  **

## 2022-12-21 NOTE — PLAN OF CARE
Problem: OCCUPATIONAL THERAPY ADULT  Goal: Performs self-care activities at highest level of function for planned discharge setting  See evaluation for individualized goals  Description: Treatment Interventions: ADL retraining, Functional transfer training, UE strengthening/ROM, Endurance training, Patient/family training, Equipment evaluation/education, Compensatory technique education, Continued evaluation, Activityengagement, Energy conservation          See flowsheet documentation for full assessment, interventions and recommendations  Note: Limitation: Decreased ADL status, Decreased UE strength, Decreased endurance, Decreased self-care trans, Decreased high-level ADLs  Prognosis: Good  Assessment: Pt is a 81 y o  female seen for OT evaluation s/p adm to [facility] on 12/20/2022 w/ Ambulatory dysfunction, Sinus tachycardia, and Hematuria  Comorbidities affecting pt’s functional performance include a significant PMH of DM, HLD, HTN, and temporal arteritis  Pt with active OT orders and activity orders for Up with assistance  Pt lives with spouse in a ranch level house with 1+1+1 DEANGELO  Pt reports spouse is home and able to assist as needed  At baseline, pt was I w/ ADLs, IADLs, and functional transfers/mobility w/o use of AD  Pt reports recent use of SPC 2* weakness  (+)   (+) fall PTA  Upon evaluation, pt currently requires Supervision for UB ADLs, Min A for LB ADLs, Min A for toileting, Supervision for bed mobility, and Min A for functional mobility/transfers 2* the following deficits impacting occupational performance: decreased strength, decreased balance and decreased tolerance  These impairments, as well at pt’s fall risk, steps to enter environment, difficulty performing ADLS and difficulty performing IADLS  limit pt’s ability to safely engage in all baseline areas of occupation   Based on the aforementioned OT evaluation, functional performance deficits, and assessments, pt has been identified as a Moderate complexity evaluation  Pt to continue to benefit from continued acute OT services during hospital stay to address defined deficits and to maximize level of functional independence in the following Occupational Performance areas: bathing/shower, toilet hygiene, dressing, medication management, health maintenance, functional mobility, community mobility, clothing management, cleaning, meal prep and household maintenance  From OT standpoint, recommend Home OT upon D/C   OT will continue to follow pt 2-3x/wk to address the following goals to  w/in 10-14 days:     OT Discharge Recommendation: Home with home health rehabilitation

## 2022-12-21 NOTE — ASSESSMENT & PLAN NOTE
4 weeks of progressive weakness with a controlled fall in the bathroom today which she was lowered to the floor  Difficulty ambulating after the fall   No loss of consciousness and did not hit her head  Possibly due to UTI with recent hematuria  Continue ceftriaxone and await urine culture  PT/OT recommending home with VNA

## 2022-12-21 NOTE — CASE MANAGEMENT
Case Management Discharge Planning Note    Patient name Trixie James  Location [facility] 68 2 /South 2 Melania Khan* MRN [id number]  : 1940 Date 2022       Current Admission Date: 2022  Current Admission Diagnosis:Ambulatory dysfunction   Patient Active Problem List    Diagnosis Date Noted   • Ambulatory dysfunction 2022   • Hematuria 2022   • Sinus tachycardia 2022   • Hypokalemia 2022   • Memory changes 2022   • Hammertoes of both feet 10/27/2022   • Onychomycosis 10/27/2022   • Constipation 10/27/2022   • Anxiety 10/27/2022   • Temporal arteritis ([facility] ) 10/12/2022   • Pseudopolyposis of colon without complication, unspecified part of colon ([address] ) 2022   • Hypercalcemia 2021   • Type 2 diabetes mellitus with ophthalmic manifestations ([address] ) 2021   • 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 2021   • Gross hematuria 2021   • Atrophic vaginitis 2020   • Murmur, cardiac 2020   • Primary osteoarthritis of right knee 2019   • Chronic fatigue 10/16/2018   • Mixed hyperlipidemia 10/21/2016   • Vitamin D deficiency 2013   • Osteopenia 10/28/2013   • Soft tissue neoplasm 2013   • Synovial cyst 2013   • Allergic rhinitis 2012   • Alopecia 2012   • Essential hypertension 2012      LOS (days): 1  Geometric Mean LOS (GMLOS) (days): 3 00  Days to GMLOS:1 9     OBJECTIVE:  Risk of Unplanned Readmission Score: 13 15         Current admission status: Inpatient   Preferred Pharmacy:   [facility]/pharmacy #[id number]- [place] ROBERT - [address] [facility]  Phone: [phone] Fax: [address], [address] [facility] [address] , [address] [place] [address]  Phone: [phone] Fax: [phone]    Primary Care Provider: Nathen Isabel PA-C    Primary Insurance: Stacy Garibay [facility] REP  Secondary Insurance: DISCHARGE DETAILS:    Discharge planning discussed with[de-identified] Patient and spouse  Freedom of Choice: Yes  Comments - Freedom of Choice: Pt and spouse agreeable to [facility]  CM contacted family/caregiver?: Yes (spouse)  Were Treatment Team discharge recommendations reviewed with patient/caregiver?: Yes  Did patient/caregiver verbalize understanding of patient care needs?: Yes  Were patient/caregiver advised of the risks associated with not following Treatment Team discharge recommendations?: Yes    Contacts  Patient Contacts: Nain Vu) -   Relationship to Patient[de-identified] Family  Contact Method: In Person  Reason/Outcome: Emergency Contact, Discharge [address]         Is the patient interested in [address] at discharge?: Yes  [address] requested[de-identified] Occupational Therapy, Physical [address] Name[de-identified] Other ([address])  [address] Provider[de-identified] PCP  [address] Needed[de-identified] Gait/ADL Training, Evaluate Functional Status and Safety, Strengthening/Theraputic Exercises to Improve Function  Homebound Criteria Met[de-identified] Uses an Assist Device (i e  cane, walker, etc), Requires the Assistance of Another Person for Safe Ambulation or to Leave the Home  Supporting Clincal Findings[de-identified] Fatigues Easliy in [employer], Limited Endurance    DME Referral Provided  Referral made for DME?: No    Other Referral/Resources/Interventions Provided:  Interventions: [facility]  Referral Comments: [facility] reserved in [place]    Would you like to participate in our [address] service program?  : No - Declined    Treatment Team Recommendation: Home with [facility]  Discharge Destination Plan[de-identified] Home with Alexis at Discharge : Family ()                                      Additional Comments: CM met with pt and  at baseline  CM reviewed available [address] agencies and family agreeable to [address] [facility]  [facility] reserved in [address]    confirmed he is able to transport home once pt is medically cleared

## 2022-12-21 NOTE — PHYSICAL THERAPY NOTE
PT EVALUATION    Pt  Name: Katya Oconnor  Pt  Age: 81 y o  MRN: [id number]  LENGTH OF STAY: 1      Admitting Diagnoses:   Dysrhythmia [I49 9]  Weakness [R53 1]  Hematuria [R31 9]    Past Medical History:   Diagnosis Date   • Diabetes mellitus ([facility] )    • Hemorrhoids    • Hyperlipidemia    • Hypertension    • ovarian    • Pseudopolyposis of colon without complication, unspecified part of colon ([facility] ) 07/01/2022       Past Surgical History:   Procedure Laterality Date   • BREAST BIOPSY      Bx breast percutan needle core use image guide (stereotactic)   • CATARACT EXTRACTION     • COLONOSCOPY     • HEMORROIDECTOMY     • SD TEMPORAL ARTERY LIGATN OR BX Bilateral 10/17/2022    Procedure: BIOPSY ARTERY TEMPORAL;  Surgeon: Yohan Osorio DO;  Location: [facility];  Service: Vascular   • TOTAL ABDOMINAL HYSTERECTOMY         Imaging Studies:  VAS lower limb venous duplex study, complete bilateral   Final Result by Elisabeth Aviles MD (12/20 2304)      X-ray chest 2 views   Final Result by Bebeto Campos MD (12/20 2039)      No acute cardiopulmonary disease  Workstation performed: [id number]         CT head without contrast   Final Result by Julian Flores MD (12/20 1253)   Addendum (preliminary) 1 of 1 by Julian Flores MD (12/20 1253)   ADDENDUM:      Trace gas fluid level in the right maxillary sinus attributed to dependent    drainage or sinusitis  Final      No acute intracranial process  No skull fracture  Chronic microangiopathy  Workstation performed: [id number]         CTA chest pe study    (Results Pending)        12/21/22 0927   PT Last Visit   PT Visit Date 12/21/22   Note Type   Note type Evaluation   Pain Assessment   Pain Score No Pain   Restrictions/Precautions   Weight Bearing Precautions Per Order No   Other Precautions Multiple lines; Fall Risk;Telemetry;Pain   Home Living   Type of Home House  (ranch style)   Home Layout One level;Stairs to enter with rails; Laundry in basement  (1+1STE w/ HR + 1 DEANGELO; FOS to basement laundry)   Bathroom Shower/Tub Tub/shower unit   Bathroom Toilet Standard   Bathroom Equipment Grab bars in shower; Tub transfer bench;Grab bars around toilet;Commode   Home Equipment Walker;Cane   Prior Function   Level of [place] Independent with functional mobility; Independent with ADLs  (w/o AD however have been using SPC in the past few days since feeling sick)   Lives With [address] in the last 6 months 1 to 4  (1x)   Vocational Retired   Comments (+) ; recent of use SPC   General   Family/Caregiver Present No   Cognition   Overall Cognitive Status WFL   Arousal/Participation Alert   Orientation Level Oriented X4   Following Commands Follows all commands and directions without difficulty   Comments pleasant & cooperative   Subjective   Subjective Pt agreeable to PT/OT evals  RUE Assessment   RUE Assessment   (refer to OT)   LUE Assessment   LUE Assessment   (refer to OT)   RLE Assessment   RLE Assessment WFL  (4/5 grossly)   LLE Assessment   LLE Assessment WFL  (4/5 grossly)   Coordination   Movements are Fluid and Coordinated 1   Sensation WFL   Bed Mobility   Supine to Sit 5  Supervision   Additional items HOB elevated; Bedrails; Increased time required;Verbal cues   Additional Comments cues for techniques & safety   Transfers   Sit to Stand 4  Minimal assistance   Additional items Assist x 1; Increased time required;Verbal cues   Stand to Sit 4  Minimal assistance   Additional items Assist x 1; Increased time required;Verbal cues   Additional Comments cues for hand placement   Ambulation/Elevation   Gait pattern Decreased foot clearance;Shuffling; Short stride; Excessively slow   Gait Assistance 4  Minimal assist   Additional items Assist x 1;Verbal cues; Tactile cues   Assistive Device None; Other (Comment)  (hand held assistance + IV pole) Distance 160'x1   Ambulation/Elevation Additional Comments no gross LOB noted; offered RW but pt requested to trial amb w/o AD   Balance   Static Sitting Good   Dynamic Sitting Fair +   Static Standing Fair -   Dynamic Standing Poor +   Ambulatory Poor +   Endurance Deficit   Endurance Deficit No   Activity Tolerance   Activity Tolerance Patient tolerated treatment well   Medical Staff Made Aware [address]   Nurse Made Aware yes   Assessment   Prognosis Good   Problem List Decreased strength;Decreased endurance; Impaired balance;Decreased mobility   Assessment Pt  82 y  o female presented w/ generalized weakness & s/p controlled fall in the bathroom at home  Pt admitted for Ambulatory dysfunction w/ RLE DVT, sinus tachycardia & hematuria possibly due to UTI  Pt referred to PT for mobility assessment & D/C planning w/ orders of up w/ assistance  Please see above for information re: home set-up & PLOF as well as objective findings during PT assessment  PTA, pt reports being I w/o AD however have been using SPC for the past few days since she started feeling sick  On eval, pt functioning below baseline hence will continue skilled PT to improve function & safety  Pt require S for bed mobility & minAx1 for transfers & amb w/ hand held assistance + IV pole  Offered RW but pt requested to trial amb w/o AD  Gait deviations as above, slow w/ dec foot clearance but no gross LOB noted  The patient's AM-PAC Basic Mobility Inpatient Short Form Raw Score is 19  A Raw score of greater than 16 suggests the patient may benefit from discharge to home  Please also refer to the recommendation of the Physical Therapist for safe discharge planning  From PT standpoint, will anticipate safe return to home w/ family support when medically cleared  Will recommend HHPT & inc family support at home  No SOB & dizziness reported t/o session  Noted elevated HR from 148-158 bpm t/o session, pt asymptomatic   Nsg staff most recent vital signs as follows: /81   Pulse (!) 129   Temp 98 7 °F (37 1 °C)   Resp 16   SpO2 93%   At end of session, pt OOB in chair in stable condition, call bell & phone in reach, all lines intact  Fall precautions reinforced w/ good understanding  CM to follow  Nsg staff to continue to mobilized pt (OOB in chair for all meals & ambulate in room/unit) as tolerated to prevent further decline in function  Nsg notified  Co-eval was necessary to complete this PT eval for the pt's best interest given pt's medical acuity & complexity  Goals   Patient Goals to go home   STG Expiration Date 12/31/22   Short Term Goal #1 Goals to be met in 10 days; pt will be able to: 1) inc strength & balance by 1/2 grade to improve overall functional mobility & dec fall risk; 2) inc bed mobility to I for pt to be able to get in/OOB safely w/ proper techniques 100% of the time, to dec caregiver burden & safely function at home; 3) inc transfers to modified I for pt to transition safely from one surface to another w/o % of the time, to dec caregiver burden & safely function at home; 4) inc amb w/ appropriate AD approx  >350' w/ S for pt to ambulate community distances w/o any % of the time, to dec caregiver burden & safely function at home; 5) negotiate stairs w/ S for inc safety during stair mgt inside/outside of home & dec caregiver burden; 6) pt/caregiver ed   PT Treatment Day 0   Plan   Treatment/Interventions Functional transfer training;LE strengthening/ROM; Elevations; Therapeutic exercise; Endurance training;Patient/family training;Bed mobility;Gait training;Spoke to nursing;OT   PT Frequency 3-5x/wk   Recommendation   PT Discharge Recommendation Home with home health rehabilitation   Equipment Recommended Meño Zimmerman  (pt has; trial next tx session)   AM-PAC Basic Mobility Inpatient   Turning in Bed Without Bedrails 4   Lying on Back to Sitting on Edge of Flat Bed 3   Moving Bed to Chair 3   Standing Up From Chair 3   Walk in Room 3 Climb 3-5 Stairs 3   Basic Mobility Inpatient Raw Score 19   Basic Mobility Standardized Score 42 48   Highest Level Of Mobility   -[facility] Goal 6: Walk 10 steps or more   -HLM Achieved 7: Walk 25 feet or more   End of Consult   Patient Position at End of Consult Bedside chair; All needs within reach   End of Consult Comments Pt in stable condition  All needs in reach  All lines intact     Hx/personal factors: co-morbidities, inaccessible home, advanced age, mutliple lines, telemetry, use of AD, h/o of falls, fall risk, and assist w/ ADL's  Examination: dec mobility, dec balance, dec endurance, dec amb, risk for falls  Clinical: unpredictable (ongoing medical status, abnormal lab values, and risk for falls)  Complexity: high    [employer]

## 2022-12-22 ENCOUNTER — APPOINTMENT (INPATIENT)
Dept: CT IMAGING | Facility: HOSPITAL | Age: 82
End: 2022-12-22

## 2022-12-22 ENCOUNTER — APPOINTMENT (INPATIENT)
Dept: NON INVASIVE DIAGNOSTICS | Facility: HOSPITAL | Age: 82
End: 2022-12-22

## 2022-12-22 PROBLEM — I26.99 ACUTE PULMONARY EMBOLISM WITHOUT ACUTE COR PULMONALE (HCC): Status: ACTIVE | Noted: 2022-12-22

## 2022-12-22 LAB
ANION GAP SERPL CALCULATED.3IONS-SCNC: 9 MMOL/L (ref 4–13)
AORTIC ROOT: 3.2 CM
AORTIC VALVE MEAN VELOCITY: 14.6 M/S
APICAL FOUR CHAMBER EJECTION FRACTION: 66 %
APTT PPP: 70 SECONDS (ref 23–37)
APTT PPP: 81 SECONDS (ref 23–37)
ASCENDING AORTA: 3.6 CM
AV AREA BY CONTINUOUS VTI: 1.2 CM2
AV AREA PEAK VELOCITY: 1.1 CM2
AV LVOT MEAN GRADIENT: 2 MMHG
AV LVOT PEAK GRADIENT: 3 MMHG
AV MEAN GRADIENT: 10 MMHG
AV PEAK GRADIENT: 18 MMHG
AV VALVE AREA: 1.21 CM2
AV VELOCITY RATIO: 0.4
BUN SERPL-MCNC: 13 MG/DL (ref 5–25)
CALCIUM SERPL-MCNC: 8.5 MG/DL (ref 8.3–10.1)
CHLORIDE SERPL-SCNC: 104 MMOL/L (ref 96–108)
CO2 SERPL-SCNC: 25 MMOL/L (ref 21–32)
CREAT SERPL-MCNC: 0.69 MG/DL (ref 0.6–1.3)
DOP CALC AO PEAK VEL: 2.13 M/S
DOP CALC AO VTI: 49.04 CM
DOP CALC LVOT AREA: 2.83 CM2
DOP CALC LVOT DIAMETER: 1.9 CM
DOP CALC LVOT PEAK VEL VTI: 20.95 CM
DOP CALC LVOT PEAK VEL: 0.85 M/S
DOP CALC LVOT STROKE INDEX: 36.3 ML/M2
DOP CALC LVOT STROKE VOLUME: 59.37
E WAVE DECELERATION TIME: 389 MS
ERYTHROCYTE [DISTWIDTH] IN BLOOD BY AUTOMATED COUNT: 16.5 % (ref 11.6–15.1)
FRACTIONAL SHORTENING: 36 (ref 28–44)
GFR SERPL CREATININE-BSD FRML MDRD: 81 ML/MIN/1.73SQ M
GLUCOSE SERPL-MCNC: 156 MG/DL (ref 65–140)
GLUCOSE SERPL-MCNC: 170 MG/DL (ref 65–140)
GLUCOSE SERPL-MCNC: 182 MG/DL (ref 65–140)
GLUCOSE SERPL-MCNC: 186 MG/DL (ref 65–140)
GLUCOSE SERPL-MCNC: 215 MG/DL (ref 65–140)
HCT VFR BLD AUTO: 31.2 % (ref 34.8–46.1)
HGB BLD-MCNC: 10.3 G/DL (ref 11.5–15.4)
INTERVENTRICULAR SEPTUM IN DIASTOLE (PARASTERNAL SHORT AXIS VIEW): 1.1 CM
INTERVENTRICULAR SEPTUM: 1.1 CM (ref 0.6–1.1)
LAAS-AP2: 17.6 CM2
LAAS-AP4: 17 CM2
LEFT ATRIUM SIZE: 4.2 CM
LEFT INTERNAL DIMENSION IN SYSTOLE: 2.3 CM (ref 2.1–4)
LEFT VENTRICULAR INTERNAL DIMENSION IN DIASTOLE: 3.6 CM (ref 3.5–6)
LEFT VENTRICULAR POSTERIOR WALL IN END DIASTOLE: 1.1 CM
LEFT VENTRICULAR STROKE VOLUME: 37 ML
LVSV (TEICH): 37 ML
MAGNESIUM SERPL-MCNC: 1.9 MG/DL (ref 1.6–2.6)
MCH RBC QN AUTO: 29.2 PG (ref 26.8–34.3)
MCHC RBC AUTO-ENTMCNC: 33 G/DL (ref 31.4–37.4)
MCV RBC AUTO: 88 FL (ref 82–98)
MV E'TISSUE VEL-LAT: 9 CM/S
MV E'TISSUE VEL-SEP: 9 CM/S
MV PEAK A VEL: 1.11 M/S
MV PEAK E VEL: 88 CM/S
MV STENOSIS PRESSURE HALF TIME: 113 MS
MV VALVE AREA P 1/2 METHOD: 1.95
NT-PROBNP SERPL-MCNC: 324 PG/ML
PLATELET # BLD AUTO: 159 THOUSANDS/UL (ref 149–390)
PMV BLD AUTO: 9.3 FL (ref 8.9–12.7)
POTASSIUM SERPL-SCNC: 4.5 MMOL/L (ref 3.5–5.3)
RA PRESSURE ESTIMATED: 3 MMHG
RBC # BLD AUTO: 3.53 MILLION/UL (ref 3.81–5.12)
RIGHT ATRIAL 2D VOLUME: 38 ML
RIGHT ATRIUM AREA SYSTOLE A4C: 15.9 CM2
RIGHT VENTRICLE ID DIMENSION: 3.1 CM
RV PSP: 50 MMHG
SL CV LEFT ATRIUM LENGTH A2C: 5.1 CM
SL CV LV EF: 75
SL CV PED ECHO LEFT VENTRICLE DIASTOLIC VOLUME (MOD BIPLANE) 2D: 54 ML
SL CV PED ECHO LEFT VENTRICLE SYSTOLIC VOLUME (MOD BIPLANE) 2D: 17 ML
SODIUM SERPL-SCNC: 138 MMOL/L (ref 135–147)
TR MAX PG: 47 MMHG
TR PEAK VELOCITY: 3.4 M/S
TRICUSPID VALVE PEAK REGURGITATION VELOCITY: 3.42 M/S
WBC # BLD AUTO: 4.33 THOUSAND/UL (ref 4.31–10.16)

## 2022-12-22 RX ORDER — IODIXANOL 320 MG/ML
85 INJECTION, SOLUTION INTRAVASCULAR
Status: COMPLETED | OUTPATIENT
Start: 2022-12-22 | End: 2022-12-22

## 2022-12-22 RX ADMIN — LATANOPROST 1 DROP: 50 SOLUTION OPHTHALMIC at 22:09

## 2022-12-22 RX ADMIN — DOCUSATE SODIUM 100 MG: 100 CAPSULE, LIQUID FILLED ORAL at 08:23

## 2022-12-22 RX ADMIN — PRAVASTATIN SODIUM 10 MG: 10 TABLET ORAL at 22:05

## 2022-12-22 RX ADMIN — Medication 250 MG: at 08:23

## 2022-12-22 RX ADMIN — SODIUM CHLORIDE AND POTASSIUM CHLORIDE 50 ML/HR: .9; .15 SOLUTION INTRAVENOUS at 02:19

## 2022-12-22 RX ADMIN — PANTOPRAZOLE SODIUM 40 MG: 40 TABLET, DELAYED RELEASE ORAL at 06:32

## 2022-12-22 RX ADMIN — INSULIN LISPRO 1 UNITS: 100 INJECTION, SOLUTION INTRAVENOUS; SUBCUTANEOUS at 11:43

## 2022-12-22 RX ADMIN — INSULIN LISPRO 1 UNITS: 100 INJECTION, SOLUTION INTRAVENOUS; SUBCUTANEOUS at 22:06

## 2022-12-22 RX ADMIN — Medication 1000 UNITS: at 08:23

## 2022-12-22 RX ADMIN — DOCUSATE SODIUM 100 MG: 100 CAPSULE, LIQUID FILLED ORAL at 18:32

## 2022-12-22 RX ADMIN — INSULIN LISPRO 1 UNITS: 100 INJECTION, SOLUTION INTRAVENOUS; SUBCUTANEOUS at 08:23

## 2022-12-22 RX ADMIN — METOPROLOL TARTRATE 25 MG: 25 TABLET, FILM COATED ORAL at 22:05

## 2022-12-22 RX ADMIN — APIXABAN 10 MG: 5 TABLET, FILM COATED ORAL at 22:04

## 2022-12-22 RX ADMIN — PREDNISONE 20 MG: 20 TABLET ORAL at 08:23

## 2022-12-22 RX ADMIN — IODIXANOL 85 ML: 320 INJECTION, SOLUTION INTRAVASCULAR at 00:48

## 2022-12-22 RX ADMIN — METOPROLOL TARTRATE 25 MG: 25 TABLET, FILM COATED ORAL at 08:23

## 2022-12-22 RX ADMIN — APIXABAN 10 MG: 5 TABLET, FILM COATED ORAL at 12:53

## 2022-12-22 RX ADMIN — INSULIN LISPRO 1 UNITS: 100 INJECTION, SOLUTION INTRAVENOUS; SUBCUTANEOUS at 16:16

## 2022-12-22 NOTE — PHYSICAL THERAPY NOTE
Physical Therapy Treatment Session:       12/22/22 8555   PT Last Visit   PT Visit Date 12/22/22   Note Type   Note Type Treatment   Pain Assessment   Pain Assessment Tool 0-10   Pain Score No Pain   Restrictions/Precautions   Other Precautions Multiple lines;Telemetry; Impulsive   General   Chart Reviewed Yes   Family/Caregiver Present No   Cognition   Overall Cognitive Status WFL   Arousal/Participation Cooperative;Responsive   Attention Within functional limits   Orientation Level Oriented X4   Following Commands Follows all commands and directions without difficulty   Subjective   Subjective Pt supine in bed resting comfortably;pt willing and agreeable to work with PT and to participate in therapy intervention;"I think I am going home tomorrow"  Bed Mobility   Supine to Sit 6  Modified independent   Additional items HOB elevated; Bedrails; Impulsive   Transfers   Sit to Stand 6  Modified independent   Additional items Bedrails; Impulsive   Stand to Sit 6  Modified independent   Additional items Armrests; Bedrails; Impulsive   Additional Comments STS transfers x10 with support of B UE on bed mattress   Ambulation/Elevation   Gait pattern Narrow OBIE; Forward Flexion; Foward flexed; Short stride   Gait Assistance 5  Supervision   Additional items Assist x 1   Assistive Device Rolling walker  (pt owns RW at home for use as needed)   Distance 250 feet with use of RW on various surfaces;no LOB noted and/or observed during upright mobility   Stair Management Assistance Not tested  (pt declined)   Balance   Static Sitting Good   Dynamic Sitting Fair +   Static Standing Fair +   Dynamic Standing Fair   Ambulatory Fair   Activity Tolerance   Activity Tolerance   (good)   Nurse Made Aware yes   Exercises   Hip Flexion Standing;10 reps;AROM; Bilateral  (support of RW in front for stability)   Hip Abduction Standing;10 reps;AROM; Bilateral  (support of RW in front for stability)   Hip Extension Standing;10 reps;AROM; Bilateral  (support of RW in front for stability)   Hip Adduction Standing;10 reps;AROM; Bilateral  (support of RW in front for stability)   Ankle Pumps Standing;10 reps;AROM; Bilateral  (heel/toe raises;support of RW in front for stability)   Squat Standing;10 reps;AROM; Bilateral  (mini squat;support of RW in front for stability)   Assessment   Prognosis Good   Problem List Decreased endurance; Impaired balance;Decreased skin integrity   Assessment Pt able to ambulate 250 feet with use of RW on various surfaces needing S level of A  No LOB noted and/or observed during ambulation trial with use of RW  Pt owns RW at home for use upon DC from hospital and currently recommend pt use RW for all mobility upon returning home, pt agreed  Pt able to perform bed mobilty and TT needing mod (I) level of A  Pt declined performance of stair training and reports "I just have two steps to get into my house and I can do that no problem"  pt able to perform and complete BLE ther ex HEP in static stand with support of RW for stability AROM  Pt would cont to benefit from skilled inpt PT services to maximize functional independence   Barriers to Discharge Inaccessible home environment  (DEANGELO)   Goals   Patient Goals to go home before the holidays   STG Expiration Date 12/31/22   PT Treatment Day 1   Plan   Treatment/Interventions Functional transfer training;LE strengthening/ROM; Elevations; Therapeutic exercise; Endurance training;Patient/family training;Equipment eval/education; Bed mobility;Gait training;Spoke to nursing   Progress Progressing toward goals   PT Frequency 3-5x/wk   Recommendation   PT Discharge Recommendation Home with home health rehabilitation   Equipment Recommended Walker  (pt owns RW at home for use as needed)   AM-PAC Basic Mobility Inpatient   Turning in Bed Without Bedrails 4   Lying on Back to Sitting on Edge of Flat Bed 4   Moving Bed to Chair 4   Standing Up From Chair 4   Walk in Room 3   Climb 3-5 Stairs 3   Basic Mobility Inpatient Raw Score 22   Basic Mobility Standardized Score 47 4   Highest Level Of Mobility   JH-HLM Goal 7: Walk 25 feet or more   JH-HLM Achieved 8: Walk 250 feet ot more

## 2022-12-22 NOTE — UTILIZATION REVIEW
NOTIFICATION OF INPATIENT ADMISSION   AUTHORIZATION REQUEST   SERVICING FACILITY:   [address] [place] [address], [facility]  Tax ID: [id number]  NPI: [id number] ATTENDING PROVIDER:  Attending Name and NPI#: [id number] DESTINY Sanchez Md [[id number]]  Address: [address] [place] [address] [facility]  Phone: [phone]     ADMISSION INFORMATION:  Place of Service: [facility]patient [facility]  [address]  Place of Service Code: 21  Inpatient Admission Date/Time: 12/20/22  2:35 PM  Discharge Date/Time: No discharge date for patient encounter  Admitting Diagnosis Code/Description:  Dysrhythmia [I49 9]  Weakness [R53 1]  Hematuria [R31 9]     UTILIZATION REVIEW CONTACT:  Luca Soto Utilization   Network Utilization Review Department  Phone: [phone]  Fax: [phone]  Email: [email or web address]  Contact for approvals/pending authorizations, clinical reviews, and discharge  PHYSICIAN ADVISORY SERVICES:  Medical Necessity Denial & Mpih-lp-Kszp Review  Phone: [phone]  Fax: [phone]  Email: [email or web address]

## 2022-12-22 NOTE — ASSESSMENT & PLAN NOTE
Biopsy proved temporal arteritis  Ultrasound had been negative  Follows with Ophthalmology    On high dose prednisone which has been slowly tapered outpatient

## 2022-12-22 NOTE — ASSESSMENT & PLAN NOTE
· Per CTA chest, "Pulmonary embolus in the upper and lower lobe pulmonary arteries on the right is noted"  · The patient was empirically treated with heparin infusion since her admission, with high clinical suspicion for PE, elevated D-dimer  · Will now transition to Eliquis, prescription electronically sent to patient's pharmacy for pricing  · Fortunately patient remains hemodynamically stable  · A 2D echo has been obtained, report pending

## 2022-12-22 NOTE — ASSESSMENT & PLAN NOTE
· Secondary to acute PE  · Patient was initiated on metoprolol, heart rate now improved and in the 90s  · TSH low normal, free T4 elevated, check T3 total  · Can discontinue telemetry

## 2022-12-22 NOTE — PLAN OF CARE
Problem: PHYSICAL THERAPY ADULT  Goal: Performs mobility at highest level of function for planned discharge setting  See evaluation for individualized goals  Description: Treatment/Interventions: Functional transfer training, LE strengthening/ROM, Elevations, Therapeutic exercise, Endurance training, Patient/family training, Bed mobility, Gait training, Spoke to nursing, OT  Equipment Recommended: Steve Lambert (pt has; trial next tx session)       See flowsheet documentation for full assessment, interventions and recommendations  Note: Prognosis: Good  Problem List: Decreased endurance, Impaired balance, Decreased skin integrity  Assessment: Pt able to ambulate 250 feet with use of RW on various surfaces needing S level of A  No LOB noted and/or observed during ambulation trial with use of RW  Pt owns RW at home for use upon DC from hospital and currently recommend pt use RW for all mobility upon returning home, pt agreed  Pt able to perform bed mobilty and TT needing mod (I) level of A  Pt declined performance of stair training and reports "I just have two steps to get into my house and I can do that no problem"  pt able to perform and complete BLE ther ex HEP in static stand with support of RW for stability AROM  Pt would cont to benefit from skilled inpt PT services to maximize functional independence  Barriers to Discharge: Inaccessible home environment (DEANGELO)     PT Discharge Recommendation: Home with home health rehabilitation    See flowsheet documentation for full assessment

## 2022-12-22 NOTE — CASE MANAGEMENT
Case Management Discharge Planning Note    Patient name Zainab Kirby  Location [facility] 68 2 /South 2 Lamont Gonzalez* MRN [id number]  : 1940 Date 2022       Current Admission Date: 2022  Current Admission Diagnosis:Acute pulmonary embolism without acute cor pulmonale [facility])   Patient Active Problem List    Diagnosis Date Noted   • Acute pulmonary embolism without acute cor pulmonale ([address] ) 2022   • Ambulatory dysfunction 2022   • Hematuria 2022   • Sinus tachycardia 2022   • Hypokalemia 2022   • Memory changes 2022   • Hammertoes of both feet 10/27/2022   • Onychomycosis 10/27/2022   • Constipation 10/27/2022   • Anxiety 10/27/2022   • Temporal arteritis ([facility] [address] ) 10/12/2022   • Pseudopolyposis of colon without complication, unspecified part of colon ([address] ) 2022   • Hypercalcemia 2021   • Type 2 diabetes mellitus with ophthalmic manifestations ([address] ) 2021   • 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 2021   • Gross hematuria 2021   • Atrophic vaginitis 2020   • Murmur, cardiac 2020   • Primary osteoarthritis of right knee 2019   • Chronic fatigue 10/16/2018   • Mixed hyperlipidemia 10/21/2016   • Vitamin D deficiency 2013   • Osteopenia 10/28/2013   • Soft tissue neoplasm 2013   • Synovial cyst 2013   • Allergic rhinitis 2012   • Alopecia 2012   • Essential hypertension 2012      LOS (days): 2  Geometric Mean LOS (GMLOS) (days): 3 00  Days to GMLOS:1     OBJECTIVE:  Risk of Unplanned Readmission Score: 13 2         Current admission status: Inpatient   Preferred Pharmacy:   [facility]/pharmacy #[id number] MARTINEZ - [address] [facility]  Phone: [phone] Fax: Yoan [address] [place] [address] [address] ,  [facility] [address] [facility] [address]  Phone: [phone] Fax: [phone]    Primary Care Provider: Jimbo Espinosa PA-C    Primary Insurance: Noemy WALTON  Secondary Insurance:     DISCHARGE DETAILS:                                [address]         Is the patient interested in Stephani Donato at discharge?: Yes  [address] requested[de-identified] Occupational Therapy, Physical [address] Name[de-identified] Other Jo Ann Huang)  [address] Provider[de-identified] PCP  Home Health Services Needed[de-identified] Gait/ADL Training, Evaluate Functional Status and Safety, Strengthening/Theraputic Exercises to Improve Function  Homebound Criteria Met[de-identified] Uses an Assist Device (i e  cane, walker, etc)  Supporting Clincal Findings[de-identified] Fatigues Easliy in [employer], Limited Endurance                                                       IMM Given (Date):: 12/22/22  IMM Given to[de-identified] Patient

## 2022-12-22 NOTE — PROGRESS NOTES
[facility] [facility]  Progress Note Jumana Risk 1940, 81 y o  female MRN: [id number]  Unit/Bed#: [address] 2 [address] 224-02 Encounter: [id number]  Primary Care Provider: Torie Osuna PA-C   Date and time admitted to hospital: 12/20/2022 11:22 AM    * Acute pulmonary embolism without acute cor pulmonale ([facility] [address] )  Assessment & Plan  · Per CTA chest, "Pulmonary embolus in the upper and lower lobe pulmonary arteries on the right is noted"  · The patient was empirically treated with heparin infusion since her admission, with high clinical suspicion for PE, elevated D-dimer  · Will now transition to Eliquis, prescription electronically sent to patient's pharmacy for pricing  · Fortunately patient remains hemodynamically stable  · A 2D echo has been obtained, report pending    Sinus tachycardia  Assessment & Plan  · Secondary to acute PE  · Patient was initiated on metoprolol, heart rate now improved and in the 90s  · TSH low normal, free T4 elevated, check T3 total  · Can discontinue telemetry    Ambulatory dysfunction  Assessment & Plan  · 4 weeks of progressive weakness with a controlled fall in the bathroom today which she was lowered to the floor  · Difficulty ambulating after the fall  No loss of consciousness and did not hit her head  · Patient was complaining of palpitations on exertion, now diagnosed with acute PE, likely contributing to difficulty with ambulating  · PT/OT recommending home with VNA, will make arrangements    Hematuria  Assessment & Plan  Urine cultures negative, discontinue antibiotics  Hemoglobin dropped initially from 12 to 10 and stable since  Can refer to Urology for outpatient cystoscopy    Memory changes  Assessment & Plan  Possibly early acute encephalopathy due to uti vs steroid induced vs early dementia  Will need eval by geriatrics outpt after steroid taper and uti treatment       Hypokalemia  Assessment & Plan  Associated with hypomagnesemia  Resolved with replacement Temporal arteritis ([facility] [address] )  Assessment & Plan  Biopsy proved temporal arteritis  Ultrasound had been negative  Follows with Ophthalmology  On high dose prednisone which has been slowly tapered outpatient     Type 2 diabetes mellitus with ophthalmic manifestations [facility])  Assessment & Plan  Lab Results   Component Value Date    HGBA1C 6 3 10/27/2022       Recent Labs     22  1642 22  2101 22  0730 22  1108   POCGLU 210* 233* 156* 186*       Blood Sugar Average: Last 72 hrs:  (P) 158   · Had previously been on metformin which has been d/garrett  · Continue insulin sliding scale alone, blood glucose in low normal range    Essential hypertension  Assessment & Plan  · HCTZ was held  · Initiated metoprolol due to marked sinus tachycardia        VTE Pharmacologic Prophylaxis: VTE Score: 4 Moderate Risk (Score 3-4) - Pharmacological DVT Prophylaxis Ordered: apixaban (Eliquis)  Patient Centered Rounds: I performed bedside rounds with nursing staff today  Discussions with Specialists or Other Care Team Provider: Multidisciplinary meeting    Time Spent for Care: 45 minutes  More than 50% of total time spent on counseling and coordination of care as described above  Current Length of Stay: 2 day(s)  Current Patient Status: Inpatient   Certification Statement: The patient will continue to require additional inpatient hospital stay due to Close monitoring  Discharge Plan: Anticipate discharge tomorrow to home with home services  Code Status: Level 1 - Full Code    Subjective:   Patient seen and examined  She reports feeling significantly improved today  No overnight events  Objective:     Vitals:   Temp (24hrs), Av 2 °F (36 8 °C), Min:97 8 °F (36 6 °C), Max:98 5 °F (36 9 °C)    Temp:  [97 8 °F (36 6 °C)-98 5 °F (36 9 °C)] 97 8 °F (36 6 °C)  HR:  [69-87] 69  Resp:  [16-18] 16  BP: (130-153)/(62-87) 137/87  SpO2:  [91 %-96 %] 94 %  Body mass index is 28 17 kg/m²       Input and Output Summary (last 24 hours): Intake/Output Summary (Last 24 hours) at 12/22/2022 1317  Last data filed at 12/22/2022 0007  Gross per 24 hour   Intake 142 94 ml   Output 300 ml   Net -157 06 ml       Physical Exam:   Physical Exam  Constitutional:       General: She is not in acute distress  HENT:      Head: Normocephalic and atraumatic  Nose: No congestion  Mouth/Throat:      Pharynx: Oropharynx is clear  Eyes:      Conjunctiva/sclera: Conjunctivae normal    Cardiovascular:      Rate and Rhythm: Normal rate and regular rhythm  Heart sounds: No murmur heard  Pulmonary:      Effort: No respiratory distress  Breath sounds: No wheezing or rales  Abdominal:      General: There is no distension  Tenderness: There is no abdominal tenderness  There is no guarding  Musculoskeletal:      Right lower leg: No edema  Left lower leg: No edema  Skin:     General: Skin is warm and dry  Neurological:      Mental Status: She is oriented to person, place, and time  Psychiatric:         Mood and Affect: Mood normal           Additional Data:     Labs:  Results from last 7 days   Lab Units 12/22/22  0557 12/21/22  0823 12/20/22  1229   WBC Thousand/uL 4 33   < > 5 28   HEMOGLOBIN g/dL 10 3*   < > 12 3   HEMATOCRIT % 31 2*   < > 36 4   PLATELETS Thousands/uL 159   < > 181   BANDS PCT %  --   --  1   LYMPHO PCT %  --   --  6*   MONO PCT %  --   --  7   EOS PCT %  --   --  0    < > = values in this interval not displayed       Results from last 7 days   Lab Units 12/22/22  0557 12/21/22  0805 12/20/22  1229   SODIUM mmol/L 138   < > 139   POTASSIUM mmol/L 4 5   < > 3 3*   CHLORIDE mmol/L 104   < > 100   CO2 mmol/L 25   < > 30   BUN mg/dL 13   < > 19   CREATININE mg/dL 0 69   < > 0 72   ANION GAP mmol/L 9   < > 9   CALCIUM mg/dL 8 5   < > 9 7   ALBUMIN g/dL  --   --  2 9*   TOTAL BILIRUBIN mg/dL  --   --  1 29*   ALK PHOS U/L  --   --  50   ALT U/L  --   --  50   AST U/L  --   --  18   GLUCOSE RANDOM mg/dL 170*   < > 126    < > = values in this interval not displayed       Results from last 7 days   Lab Units 12/20/22  1814   INR  1 04     Results from last 7 days   Lab Units 12/22/22  1108 12/22/22  0730 12/21/22  2101 12/21/22  1642 12/21/22  1103 12/21/22  0710 12/21/22  0056 12/20/22  2057 12/20/22  1910   POC GLUCOSE mg/dl 186* 156* 233* 210* 119 67 100 195* 156*               Lines/Drains:  Invasive Devices     Peripheral Intravenous Line  Duration           Peripheral IV 12/21/22 Left;Proximal;Ventral (anterior) Forearm 1 day    Peripheral IV 12/21/22 Right;Ventral (anterior) Forearm 1 day    Peripheral IV 12/21/22 Right Antecubital <1 day                      Imaging: Reviewed radiology reports from this admission including: chest CT scan and Personally reviewed the following imaging: chest CT scan    Recent Cultures (last 7 days):   Results from last 7 days   Lab Units 12/20/22  1228   URINE CULTURE  10,000-19,000 cfu/ml       Last 24 Hours Medication List:   Current Facility-Administered Medications   Medication Dose Route Frequency Provider Last Rate   • acetaminophen  650 mg Oral Q6H PRN Yaritza Fuentes DO     • ALPRAZolam  0 25 mg Oral Daily PRN Yaritza Fuentes DO     • apixaban  10 mg Oral BID Carolin Rodriguez MD     • ascorbic acid  250 mg Oral Daily Yaritza Fuentes DO     • cholecalciferol  1,000 Units Oral Daily Yaritza Fuentes DO     • diphenhydrAMINE  50 mg Oral 60 Min Pre-Op Carolin Rodriguez MD     • docusate sodium  100 mg Oral BID Yaritza Fuentes DO     • insulin lispro  1-5 Units Subcutaneous TID AC Yaritza Fuentes DO     • insulin lispro  1-5 Units Subcutaneous HS Yaritza Fuentes DO     • latanoprost  1 drop Both Eyes HS Yaritza Fuentes DO     • metoprolol tartrate  25 mg Oral Q12H [facility] Carolin Rodriguez MD     • ondansetron  4 mg Intravenous Q6H PRN Yaritza Fuentes DO     • pantoprazole  40 mg Oral Early Morning Yaritza Fuentes DO     • pravastatin  10 mg Oral HS Yaritza Fuentes DO     • predniSONE  20 mg Oral Daily Yaritza Fuentes DO     • sodium chloride (PF)  3 mL Intravenous Q1H PRN Tiffanie Medina DO          Today, Patient Was Seen By: Antony Mancia MD    **Please Note: This note may have been constructed using a voice recognition system  **

## 2022-12-22 NOTE — ASSESSMENT & PLAN NOTE
Lab Results   Component Value Date    HGBA1C 6 3 10/27/2022       Recent Labs     12/21/22  1642 12/21/22  2101 12/22/22  0730 12/22/22  1108   POCGLU 210* 233* 156* 186*       Blood Sugar Average: Last 72 hrs:  (P) 158   · Had previously been on metformin which has been d/garrett  · Continue insulin sliding scale alone, blood glucose in low normal range

## 2022-12-22 NOTE — ASSESSMENT & PLAN NOTE
Urine cultures negative, discontinue antibiotics  Hemoglobin dropped initially from 12 to 10 and stable since  Can refer to Urology for outpatient cystoscopy

## 2022-12-22 NOTE — QUICK NOTE
Was notified by radiology department of results of STAT CTA PE which demonstrated PE in the right upper and lower lobe pulmonary arteries  Patient already empirically started started on Heparin drip, will continue  Evidence of right heart strain, so ECHO and Pro-BNP ordered

## 2022-12-22 NOTE — PLAN OF CARE
Problem: PAIN - ADULT  Goal: Verbalizes/displays adequate comfort level or baseline comfort level  Description: Interventions:  - Encourage patient to monitor pain and request assistance  - Assess pain using appropriate pain scale  - Administer analgesics based on type and severity of pain and evaluate response  - Implement non-pharmacological measures as appropriate and evaluate response  - Consider cultural and social influences on pain and pain management  - Notify physician/advanced practitioner if interventions unsuccessful or patient reports new pain  Outcome: Progressing     Problem: INFECTION - ADULT  Goal: Absence or prevention of progression during hospitalization  Description: INTERVENTIONS:  - Assess and monitor for signs and symptoms of infection  - Monitor lab/diagnostic results  - Monitor all insertion sites, i e  indwelling lines, tubes, and drains  - Monitor endotracheal if appropriate and nasal secretions for changes in amount and color  - [place] appropriate cooling/warming therapies per order  - Administer medications as ordered  - Instruct and encourage patient and family to use good hand hygiene technique  - Identify and instruct in appropriate isolation precautions for identified infection/condition  Outcome: Progressing     Problem: SAFETY ADULT  Goal: Patient will remain free of falls  Description: INTERVENTIONS:  - Educate patient/family on patient safety including physical limitations  - Instruct patient to call for assistance with activity   - Consult OT/PT to assist with strengthening/mobility   - Keep Call bell within reach  - Keep bed low and locked with side rails adjusted as appropriate  - Keep care items and personal belongings within reach  - Initiate and maintain comfort rounds  - Make Fall Risk Sign visible to staff  - Offer Toileting every 2 Hours, in advance of need  - Initiate/Maintain bed alarm  - Obtain necessary fall risk management equipment: alarms   - Apply yellow socks and bracelet for high fall risk patients  - Consider moving patient to room near nurses station  Outcome: Progressing  Goal: Maintain or return to baseline ADL function  Description: INTERVENTIONS:  -  Assess patient's ability to carry out ADLs; assess patient's baseline for ADL function and identify physical deficits which impact ability to perform ADLs (bathing, care of mouth/teeth, toileting, grooming, dressing, etc )  - Assess/evaluate cause of self-care deficits   - Assess range of motion  - Assess patient's mobility; develop plan if impaired  - Assess patient's need for assistive devices and provide as appropriate  - Encourage maximum independence but intervene and supervise when necessary  - Involve family in performance of ADLs  - Assess for home care needs following discharge   - Consider OT consult to assist with ADL evaluation and planning for discharge  - Provide patient education as appropriate  Outcome: Progressing  Goal: Maintains/Returns to pre admission functional level  Description: INTERVENTIONS:  - Perform BMAT or MOVE assessment daily    - Set and communicate daily mobility goal to care team and patient/family/caregiver  - Collaborate with rehabilitation services on mobility goals if consulted  - Perform Range of Motion 4 times a day  - Reposition patient every 2 hours    - Dangle patient 3 times a day  - Stand patient 3 times a day  - Ambulate patient 3 times a day  - Out of bed to chair 3 times a day   - Out of bed for meals 3 times a day  - Out of bed for toileting  - Record patient progress and toleration of activity level   Outcome: Progressing     Problem: DISCHARGE PLANNING  Goal: Discharge to home or other facility with appropriate resources  Description: INTERVENTIONS:  - Identify barriers to discharge w/patient and caregiver  - Arrange for needed discharge resources and transportation as appropriate  - Identify discharge learning needs (meds, wound care, etc )  - Arrange for interpretive services to assist at discharge as needed  - Refer to Case Management Department for coordinating discharge planning if the patient needs post-hospital services based on physician/advanced practitioner order or complex needs related to functional status, cognitive ability, or social support system  Outcome: Progressing     Problem: Knowledge Deficit  Goal: Patient/family/caregiver demonstrates understanding of disease process, treatment plan, medications, and discharge instructions  Description: Complete learning assessment and assess knowledge base  Interventions:  - Provide teaching at level of understanding  - Provide teaching via preferred learning methods  Outcome: Progressing     Problem: Nutrition/Hydration-ADULT  Goal: Nutrient/Hydration intake appropriate for improving, restoring or maintaining nutritional needs  Description: Monitor and assess patient's nutrition/hydration status for malnutrition  Collaborate with interdisciplinary team and initiate plan and interventions as ordered  Monitor patient's weight and dietary intake as ordered or per policy  Utilize nutrition screening tool and intervene as necessary  Determine patient's food preferences and provide high-protein, high-caloric foods as appropriate       INTERVENTIONS:  - Monitor oral intake, urinary output, labs, and treatment plans  - Assess nutrition and hydration status and recommend course of action  - Evaluate amount of meals eaten  - Assist patient with eating if necessary   - Allow adequate time for meals  - Recommend/ encourage appropriate diets, oral nutritional supplements, and vitamin/mineral supplements  - Order, calculate, and assess calorie counts as needed  - Recommend, monitor, and adjust tube feedings and TPN/PPN based on assessed needs  - Assess need for intravenous fluids  - Provide specific nutrition/hydration education as appropriate  - Include patient/family/caregiver in decisions related to nutrition  Outcome: Progressing

## 2022-12-23 ENCOUNTER — TRANSITIONAL CARE MANAGEMENT (OUTPATIENT)
Dept: FAMILY MEDICINE CLINIC | Facility: CLINIC | Age: 82
End: 2022-12-23

## 2022-12-23 VITALS
BODY MASS INDEX: 28.34 KG/M2 | HEIGHT: 62 IN | OXYGEN SATURATION: 93 % | RESPIRATION RATE: 16 BRPM | HEART RATE: 71 BPM | SYSTOLIC BLOOD PRESSURE: 162 MMHG | TEMPERATURE: 98.6 F | WEIGHT: 154 LBS | DIASTOLIC BLOOD PRESSURE: 59 MMHG

## 2022-12-23 LAB
ANION GAP SERPL CALCULATED.3IONS-SCNC: 5 MMOL/L (ref 4–13)
APTT PPP: 26 SECONDS (ref 23–37)
BASOPHILS # BLD AUTO: 0.06 THOUSANDS/ÂΜL (ref 0–0.1)
BASOPHILS NFR BLD AUTO: 1 % (ref 0–1)
BUN SERPL-MCNC: 22 MG/DL (ref 5–25)
CALCIUM SERPL-MCNC: 9.3 MG/DL (ref 8.3–10.1)
CHLORIDE SERPL-SCNC: 104 MMOL/L (ref 96–108)
CO2 SERPL-SCNC: 29 MMOL/L (ref 21–32)
CREAT SERPL-MCNC: 0.72 MG/DL (ref 0.6–1.3)
EOSINOPHIL # BLD AUTO: 0.02 THOUSAND/ÂΜL (ref 0–0.61)
EOSINOPHIL NFR BLD AUTO: 0 % (ref 0–6)
ERYTHROCYTE [DISTWIDTH] IN BLOOD BY AUTOMATED COUNT: 17.2 % (ref 11.6–15.1)
GFR SERPL CREATININE-BSD FRML MDRD: 78 ML/MIN/1.73SQ M
GLUCOSE SERPL-MCNC: 65 MG/DL (ref 65–140)
GLUCOSE SERPL-MCNC: 73 MG/DL (ref 65–140)
HCT VFR BLD AUTO: 34.3 % (ref 34.8–46.1)
HGB BLD-MCNC: 11.3 G/DL (ref 11.5–15.4)
IMM GRANULOCYTES # BLD AUTO: >0.5 THOUSAND/UL (ref 0–0.2)
IMM GRANULOCYTES NFR BLD AUTO: 8 % (ref 0–2)
LYMPHOCYTES # BLD AUTO: 1.17 THOUSANDS/ÂΜL (ref 0.6–4.47)
LYMPHOCYTES NFR BLD AUTO: 16 % (ref 14–44)
MAGNESIUM SERPL-MCNC: 2 MG/DL (ref 1.6–2.6)
MCH RBC QN AUTO: 29.3 PG (ref 26.8–34.3)
MCHC RBC AUTO-ENTMCNC: 32.9 G/DL (ref 31.4–37.4)
MCV RBC AUTO: 89 FL (ref 82–98)
MONOCYTES # BLD AUTO: 0.52 THOUSAND/ÂΜL (ref 0.17–1.22)
MONOCYTES NFR BLD AUTO: 7 % (ref 4–12)
NEUTROPHILS # BLD AUTO: 5.08 THOUSANDS/ÂΜL (ref 1.85–7.62)
NEUTS SEG NFR BLD AUTO: 68 % (ref 43–75)
NRBC BLD AUTO-RTO: 0 /100 WBCS
PLATELET # BLD AUTO: 205 THOUSANDS/UL (ref 149–390)
PMV BLD AUTO: 9.6 FL (ref 8.9–12.7)
POTASSIUM SERPL-SCNC: 4.1 MMOL/L (ref 3.5–5.3)
RBC # BLD AUTO: 3.86 MILLION/UL (ref 3.81–5.12)
SODIUM SERPL-SCNC: 138 MMOL/L (ref 135–147)
T3 SERPL-MCNC: 0.6 NG/ML (ref 0.6–1.8)
WBC # BLD AUTO: 7.41 THOUSAND/UL (ref 4.31–10.16)

## 2022-12-23 RX ORDER — HYDROCHLOROTHIAZIDE 25 MG/1
25 TABLET ORAL DAILY
Qty: 30 TABLET | Refills: 0 | Status: SHIPPED | OUTPATIENT
Start: 2022-12-23

## 2022-12-23 RX ORDER — PANTOPRAZOLE SODIUM 40 MG/1
40 TABLET, DELAYED RELEASE ORAL
Qty: 30 TABLET | Refills: 0 | Status: SHIPPED | OUTPATIENT
Start: 2022-12-24

## 2022-12-23 RX ADMIN — DOCUSATE SODIUM 100 MG: 100 CAPSULE, LIQUID FILLED ORAL at 08:11

## 2022-12-23 RX ADMIN — APIXABAN 10 MG: 5 TABLET, FILM COATED ORAL at 08:11

## 2022-12-23 RX ADMIN — PREDNISONE 20 MG: 20 TABLET ORAL at 08:11

## 2022-12-23 RX ADMIN — PANTOPRAZOLE SODIUM 40 MG: 40 TABLET, DELAYED RELEASE ORAL at 05:06

## 2022-12-23 RX ADMIN — Medication 1000 UNITS: at 08:11

## 2022-12-23 RX ADMIN — Medication 250 MG: at 08:11

## 2022-12-23 RX ADMIN — METOPROLOL TARTRATE 25 MG: 25 TABLET, FILM COATED ORAL at 08:11

## 2022-12-23 NOTE — CONSULTS
Consultation - Pulmonary Medicine   Ana Rivera 81 y o  female MRN: [id number]  Unit/Bed#: [facility] [address] [facility] [facility] [id number] Encounter: [id number]      Assessment/Plan:    1  Acute pulmonary embolism without acute cor pulmonale   - Elevated D-dimer on admission, 1 84  - Subsequent CTA showed pulmonary embolus in the upper and lower lobe pulmonary arteries on the right  - Patient was on a heparin infusion, has since transitioned to Eliquis  -Echo shows right ventricular cavity size is normal   Systolic function is normal   The right atrium is normal in size  Right ventricular systolic pressure is moderately elevated at 50 00 mmHg    2  Sinus tachycardia  -Secondary to acute PE  Patient was initiated on metoprolol    3  Ambulatory dysfunction  - Has had an episode of a controlled fall at home, where she was lowered to the floor and her   Patient did not hit her head or lose consciousness  - Patient will be discharged home with VNA services    4  Hematuria   - referred to outpatient urology     Patient will see us in the office in the end of January, appointment has been made  History of Present Illness   Physician Requesting Consult: Paige Brown MD  Reason for Consult / Principal Problem: pulmonary embolism   Hx and PE limited by: none  Chief Complaint: "Ready to go home "  HPI: Ana Rivera is a 81 y o  [place] female who [facility]"" [facility]  with complaints of weakness s/p controlled fall at home and tachycardia  Patient states that about 4 weeks ago she and her  had bronchitis  It "hit her a little harder" because she is on chronic steroids for temporal arteritis  She had an appointment with her primary care doctor and was sent to the ER from there due to sinus tachycardia  Patient denied any lower extremity edema, chest pain, dyspnea on exertion, or shortness of breath  D-dimer was elevated at 1 84 on admission    CTA showed pulmonary embolus in the upper and lower lobe lobe pulmonary arteries on the right  Lower extremity Dopplers did not show any DVTs  Pulmonary was consulted for pulmonary embolism and outpatient follow-up  Patient has personal history of ovarian cancer that never required treatment because it was not aggressive and very small  States this was around 30 years ago  Denies any other cancers, recent travel, or personal history of DVT/PE  Patient was initially on a heparin drip, has since transitioned to Eliquis and will be going home today  She will continue Eliquis outpatient  An appointment was made for her and is on her discharge instructions  Inpatient consult to Pulmonology  Consult performed by: SHREE Zaidi  Consult ordered by: Marlen Asher MD          Review of Systems   Constitutional: Positive for activity change and fatigue  Negative for chills  HENT: Negative for congestion and postnasal drip  Respiratory: Negative for cough, chest tightness, shortness of breath and wheezing  Cardiovascular: Negative for chest pain, palpitations and leg swelling  Musculoskeletal: Positive for gait problem         Historical Information   Past Medical History:   Diagnosis Date   • Diabetes mellitus ([facility] )    • Hemorrhoids    • Hyperlipidemia    • Hypertension    • ovarian    • Pseudopolyposis of colon without complication, unspecified part of colon ([facility] ) 07/01/2022     Past Surgical History:   Procedure Laterality Date   • BREAST BIOPSY      Bx breast percutan needle core use image guide (stereotactic)   • CATARACT EXTRACTION     • COLONOSCOPY     • HEMORROIDECTOMY     • SD TEMPORAL ARTERY LIGATN OR BX Bilateral 10/17/2022    Procedure: BIOPSY ARTERY TEMPORAL;  Surgeon: Asia Rodrgiuez DO;  Location: [facility];  Service: Vascular   • TOTAL ABDOMINAL HYSTERECTOMY       Social History   Social History     Substance and Sexual Activity   Alcohol Use Not Currently    Comment: rarely     Social History     Substance and Sexual Activity   Drug Use Never     Social History     Tobacco Use   Smoking Status Never   Smokeless Tobacco Never     E-Cigarette/Vaping     E-Cigarette/Vaping Substances   • Nicotine No    • THC No    • CBD No      Occupational History: retired     Family History:   Family History   Problem Relation Age of Onset   • Heart disease Mother    • Lung cancer Mother    • Uterine cancer Mother    • Heart disease Father    • Breast cancer Sister    • Diabetes Maternal Grandmother        Meds/Allergies   all current active meds have been reviewed, pertinent pulmonary meds have been reviewed and current meds:   Current Facility-Administered Medications   Medication Dose Route Frequency   • acetaminophen (TYLENOL) tablet 650 mg  650 mg Oral Q6H PRN   • ALPRAZolam (XANAX) tablet 0 25 mg  0 25 mg Oral Daily PRN   • apixaban (ELIQUIS) tablet 10 mg  10 mg Oral BID   • ascorbic acid (VITAMIN C) tablet 250 mg  250 mg Oral Daily   • cholecalciferol (VITAMIN D3) tablet 1,000 Units  1,000 Units Oral Daily   • diphenhydrAMINE (BENADRYL) tablet 50 mg  50 mg Oral 60 Min Pre-Op   • docusate sodium (COLACE) capsule 100 mg  100 mg Oral BID   • insulin lispro (HumaLOG) 100 units/mL subcutaneous injection 1-5 Units  1-5 Units Subcutaneous TID AC   • insulin lispro (HumaLOG) 100 units/mL subcutaneous injection 1-5 Units  1-5 Units Subcutaneous HS   • latanoprost (XALATAN) 0 005 % ophthalmic solution 1 drop  1 drop Both Eyes HS   • metoprolol tartrate (LOPRESSOR) tablet 25 mg  25 mg Oral Q12H ELIAS   • ondansetron (ZOFRAN) injection 4 mg  4 mg Intravenous Q6H PRN   • pantoprazole (PROTONIX) EC tablet 40 mg  40 mg Oral Early Morning   • pravastatin (PRAVACHOL) tablet 10 mg  10 mg Oral HS   • predniSONE tablet 20 mg  20 mg Oral Daily   • sodium chloride (PF) 0 9 % injection 3 mL  3 mL Intravenous Q1H PRN       Allergies   Allergen Reactions   • Iodinated Diagnostic Agents Hives       Objective   Vitals: Blood pressure 162/59, pulse 71, temperature 98 6 °F (37 °C), temperature source Oral, resp  rate 16, height 5' 2" (1 575 m), weight 69 9 kg (154 lb), SpO2 93 %  RA,Body mass index is 28 17 kg/m²  No intake or output data in the 24 hours ending 12/23/22 0950  Invasive Devices     Peripheral Intravenous Line  Duration           Peripheral IV 12/21/22 Left;Proximal;Ventral (anterior) Forearm 2 days    Peripheral IV 12/21/22 Right;Ventral (anterior) Forearm 2 days    Peripheral IV 12/21/22 Right Antecubital 1 day                Physical Exam  Vitals reviewed  Constitutional:       General: She is not in acute distress  Appearance: Normal appearance  She is not ill-appearing  HENT:      Head: Normocephalic and atraumatic  Nose: Nose normal       Mouth/Throat:      Pharynx: Oropharynx is clear  Eyes:      Conjunctiva/sclera: Conjunctivae normal    Cardiovascular:      Rate and Rhythm: Normal rate and regular rhythm  Pulses: Normal pulses  Heart sounds: Normal heart sounds  Pulmonary:      Effort: Pulmonary effort is normal  No respiratory distress  Breath sounds: Normal breath sounds  No stridor  No wheezing, rhonchi or rales  Chest:      Chest wall: No tenderness  Abdominal:      Palpations: Abdomen is soft  Musculoskeletal:         General: No swelling or tenderness  Normal range of motion  Cervical back: Normal range of motion  Right lower leg: No edema  Left lower leg: No edema  Skin:     General: Skin is warm and dry  Neurological:      General: No focal deficit present  Mental Status: She is alert and oriented to person, place, and time  Psychiatric:         Mood and Affect: Mood normal          Behavior: Behavior normal          Lab Results:   I have personally reviewed pertinent lab results  , CBC:   Lab Results   Component Value Date    WBC 7 41 12/23/2022    HGB 11 3 (L) 12/23/2022    HCT 34 3 (L) 12/23/2022    MCV 89 12/23/2022     12/23/2022    MCH 29 3 12/23/2022    MCHC 32 9 12/23/2022 RDW 17 2 (H) 12/23/2022    MPV 9 6 12/23/2022    NRBC 0 12/23/2022   , CMP:   Lab Results   Component Value Date    SODIUM 138 12/23/2022    K 4 1 12/23/2022     12/23/2022    CO2 29 12/23/2022    BUN 22 12/23/2022    CREATININE 0 72 12/23/2022    CALCIUM 9 3 12/23/2022    EGFR 78 12/23/2022     D-Dimer: 1 84    BNP: 324    Imaging Studies: I have personally reviewed pertinent reports  and I have personally reviewed pertinent films in PACS   12/22/22 CTA chest PE study: Pulmonary embolus in the upper and lower lobe pulmonary arteries on the right  Right anterior lower chest wall skin nodule likely a sebaceous cyst   No evidence for right heart strain     12/20/22 lower limb venous duplex: No evidence of acute or chronic deep vein thrombosis in either the right or left lower limb  No evidence of superficial thrombophlebitis noted in either the right or the left lower limb  EKG, Pathology, and Other Studies: I have personally reviewed pertinent reports  12/22/22 Echo: Left ventricular cavity size is normal   Wall thickness is mildly increased  The left ventricular ejection fraction is 75%  Systolic function is hyperdynamic  Diastolic function is mildly abnormal, consistent with grade 1 relaxation  Mild aortic valve regurgitation  Mild to moderate regurgitation of the mitral valve  Moderate regurgitation of the tricuspid valve  The right ventricular systolic pressure is moderately elevated at 50 00 mmHg  12/21/22 ECG: Sinus tachycardia with premature atrial complexes    Pulmonary Results (PFTs, PSG): None on file, pt  does not follow with a pulmonologist       VTE Prophylaxis: Eliquis    Code Status: Level 1 - Full Code    Portions of the record may have been created with voice recognition software  Occasional wrong word or "sound a like" substitutions may have occurred due to the inherent limitations of voice recognition software    Read the chart carefully and recognize, using context, where substitutions have occurred

## 2022-12-23 NOTE — NURSING NOTE
Reviewed discharge instructions and coming medications with patient and spouse  Discussed the importance of follow up care and reviewed all medication changes

## 2022-12-23 NOTE — PLAN OF CARE
Problem: PAIN - ADULT  Goal: Verbalizes/displays adequate comfort level or baseline comfort level  Description: Interventions:  - Encourage patient to monitor pain and request assistance  - Assess pain using appropriate pain scale  - Administer analgesics based on type and severity of pain and evaluate response  - Implement non-pharmacological measures as appropriate and evaluate response  - Consider cultural and social influences on pain and pain management  - Notify physician/advanced practitioner if interventions unsuccessful or patient reports new pain  Outcome: Progressing     Problem: INFECTION - ADULT  Goal: Absence or prevention of progression during hospitalization  Description: INTERVENTIONS:  - Assess and monitor for signs and symptoms of infection  - Monitor lab/diagnostic results  - Monitor all insertion sites, i e  indwelling lines, tubes, and drains  - Monitor endotracheal if appropriate and nasal secretions for changes in amount and color  - [place] appropriate cooling/warming therapies per order  - Administer medications as ordered  - Instruct and encourage patient and family to use good hand hygiene technique  - Identify and instruct in appropriate isolation precautions for identified infection/condition  Outcome: Progressing     Problem: SAFETY ADULT  Goal: Patient will remain free of falls  Description: INTERVENTIONS:  - Educate patient/family on patient safety including physical limitations  - Instruct patient to call for assistance with activity   - Consult OT/PT to assist with strengthening/mobility   - Keep Call bell within reach  - Keep bed low and locked with side rails adjusted as appropriate  - Keep care items and personal belongings within reach  - Initiate and maintain comfort rounds  - Make Fall Risk Sign visible to staff  - Offer Toileting every  Hours, in advance of need  - Initiate/Maintain alarm  - Obtain necessary fall risk management equipment:   - Apply yellow socks and bracelet for high fall risk patients  - Consider moving patient to room near nurses station  Outcome: Progressing  Goal: Maintain or return to baseline ADL function  Description: INTERVENTIONS:  -  Assess patient's ability to carry out ADLs; assess patient's baseline for ADL function and identify physical deficits which impact ability to perform ADLs (bathing, care of mouth/teeth, toileting, grooming, dressing, etc )  - Assess/evaluate cause of self-care deficits   - Assess range of motion  - Assess patient's mobility; develop plan if impaired  - Assess patient's need for assistive devices and provide as appropriate  - Encourage maximum independence but intervene and supervise when necessary  - Involve family in performance of ADLs  - Assess for home care needs following discharge   - Consider OT consult to assist with ADL evaluation and planning for discharge  - Provide patient education as appropriate  Outcome: Progressing  Goal: Maintains/Returns to pre admission functional level  Description: INTERVENTIONS:  - Perform BMAT or MOVE assessment daily    - Set and communicate daily mobility goal to care team and patient/family/caregiver  - Collaborate with rehabilitation services on mobility goals if consulted  - Perform Range of Motion  times a day  - Reposition patient every  hours    - Dangle patient  times a day  - Stand patient  times a day  - Ambulate patient  times a day  - Out of bed to chair  times a day   - Out of bed for meals  times a day  - Out of bed for toileting  - Record patient progress and toleration of activity level   Outcome: Progressing     Problem: DISCHARGE PLANNING  Goal: Discharge to home or other facility with appropriate resources  Description: INTERVENTIONS:  - Identify barriers to discharge w/patient and caregiver  - Arrange for needed discharge resources and transportation as appropriate  - Identify discharge learning needs (meds, wound care, etc )  - Arrange for interpretive services to assist at discharge as needed  - Refer to Case Management Department for coordinating discharge planning if the patient needs post-hospital services based on physician/advanced practitioner order or complex needs related to functional status, cognitive ability, or social support system  Outcome: Progressing     Problem: Knowledge Deficit  Goal: Patient/family/caregiver demonstrates understanding of disease process, treatment plan, medications, and discharge instructions  Description: Complete learning assessment and assess knowledge base  Interventions:  - Provide teaching at level of understanding  - Provide teaching via preferred learning methods  Outcome: Progressing     Problem: Nutrition/Hydration-ADULT  Goal: Nutrient/Hydration intake appropriate for improving, restoring or maintaining nutritional needs  Description: Monitor and assess patient's nutrition/hydration status for malnutrition  Collaborate with interdisciplinary team and initiate plan and interventions as ordered  Monitor patient's weight and dietary intake as ordered or per policy  Utilize nutrition screening tool and intervene as necessary  Determine patient's food preferences and provide high-protein, high-caloric foods as appropriate       INTERVENTIONS:  - Monitor oral intake, urinary output, labs, and treatment plans  - Assess nutrition and hydration status and recommend course of action  - Evaluate amount of meals eaten  - Assist patient with eating if necessary   - Allow adequate time for meals  - Recommend/ encourage appropriate diets, oral nutritional supplements, and vitamin/mineral supplements  - Order, calculate, and assess calorie counts as needed  - Recommend, monitor, and adjust tube feedings and TPN/PPN based on assessed needs  - Assess need for intravenous fluids  - Provide specific nutrition/hydration education as appropriate  - Include patient/family/caregiver in decisions related to nutrition  Outcome: Progressing     Problem: Potential for Falls  Goal: Patient will remain free of falls  Description: INTERVENTIONS:  - Educate patient/family on patient safety including physical limitations  - Instruct patient to call for assistance with activity   - Consult OT/PT to assist with strengthening/mobility   - Keep Call bell within reach  - Keep bed low and locked with side rails adjusted as appropriate  - Keep care items and personal belongings within reach  - Initiate and maintain comfort rounds  - Make Fall Risk Sign visible to staff  - Offer Toileting every  Hours, in advance of need  - Initiate/Maintain alarm  - Obtain necessary fall risk management equipment  - Apply yellow socks and bracelet for high fall risk patients  - Consider moving patient to room near nurses station  Outcome: Progressing     Problem: Prexisting or High Potential for Compromised Skin Integrity  Goal: Skin integrity is maintained or improved  Description: INTERVENTIONS:  - Identify patients at risk for skin breakdown  - Assess and monitor skin integrity  - Assess and monitor nutrition and hydration status  - Monitor labs   - Assess for incontinence   - Turn and reposition patient  - Assist with mobility/ambulation  - Relieve pressure over bony prominences  - Avoid friction and shearing  - Provide appropriate hygiene as needed including keeping skin clean and dry  - Evaluate need for skin moisturizer/barrier cream  - Collaborate with interdisciplinary team   - Patient/family teaching  - Consider wound care consult   Outcome: Progressing

## 2022-12-24 NOTE — ASSESSMENT & PLAN NOTE
· 4 weeks of progressive weakness with a controlled fall in the bathroom today which she was lowered to the floor  · Difficulty ambulating after the fall   No loss of consciousness and did not hit her head  · Patient was complaining of palpitations on exertion, now diagnosed with acute PE, likely contributing to difficulty with ambulating  · PT/OT recommending home with VNA, case management input appreciated, arrangements have been made

## 2022-12-24 NOTE — ASSESSMENT & PLAN NOTE
· Per CTA chest, "Pulmonary embolus in the upper and lower lobe pulmonary arteries on the right is noted"  · The patient was empirically treated with heparin infusion since her admission, with high clinical suspicion for PE, elevated D-dimer  · Will now transition to Eliquis, prescription electronically sent to patient's pharmacy for pricing  · Fortunately patient remains hemodynamically stable  · A 2D echo is reassuring  · Appreciate pulmonology input, outpatient follow-up has been scheduled

## 2022-12-24 NOTE — ASSESSMENT & PLAN NOTE
Microscopic hematuria, possibly related to recent UTI  Urine culture is negative, diuretic has been discontinued  Can repeat the UA on outpatient basis to ensure resolution

## 2022-12-24 NOTE — ASSESSMENT & PLAN NOTE
Possibly early acute encephalopathy due to uti vs steroid induced vs early dementia  Can be referred for neurology evaluation by PCP

## 2022-12-24 NOTE — ASSESSMENT & PLAN NOTE
· Secondary to acute PE  · Patient was initiated on metoprolol, tachycardia has resolved  · TSH low normal, free T4 elevated, check T3 total

## 2022-12-24 NOTE — ASSESSMENT & PLAN NOTE
Lab Results   Component Value Date    HGBA1C 6 3 10/27/2022       Recent Labs     12/22/22  1108 12/22/22  1557 12/22/22 2122 12/23/22  0746   POCGLU 186* 182* 215* 65       Blood Sugar Average: Last 72 hrs:  (P) 157   · Had previously been on metformin which has been d/garrett  · Continue diet control

## 2022-12-24 NOTE — ASSESSMENT & PLAN NOTE
· HCTZ resumed at half dose  · Initiated metoprolol due to marked sinus tachycardia which is now resolved

## 2022-12-24 NOTE — DISCHARGE SUMMARY
[address] [facility]  Discharge- Zainab Kirby 1940, 81 y o  female MRN: [id number]  Unit/Bed#: [address] 224-02 Encounter: [id number]  Primary Care Provider: Benson Chester PA-C   Date and time admitted to hospital: 12/20/2022 11:22 AM    * Acute pulmonary embolism without acute cor pulmonale ([address] )  Assessment & Plan  · Per CTA chest, "Pulmonary embolus in the upper and lower lobe pulmonary arteries on the right is noted"  · The patient was empirically treated with heparin infusion since her admission, with high clinical suspicion for PE, elevated D-dimer  · Will now transition to Eliquis, prescription electronically sent to patient's pharmacy for pricing  · Fortunately patient remains hemodynamically stable  · A 2D echo is reassuring  · Appreciate pulmonology input, outpatient follow-up has been scheduled    Sinus tachycardia  Assessment & Plan  · Secondary to acute PE  · Patient was initiated on metoprolol, tachycardia has resolved  · TSH low normal, free T4 elevated, check T3 total      Ambulatory dysfunction  Assessment & Plan  · 4 weeks of progressive weakness with a controlled fall in the bathroom today which she was lowered to the floor  · Difficulty ambulating after the fall   No loss of consciousness and did not hit her head  · Patient was complaining of palpitations on exertion, now diagnosed with acute PE, likely contributing to difficulty with ambulating  · PT/OT recommending home with VNA, case management input appreciated, arrangements have been made    Hematuria  Assessment & Plan  Microscopic hematuria, possibly related to recent UTI  Urine culture is negative, diuretic has been discontinued  Can repeat the UA on outpatient basis to ensure resolution    Memory changes  Assessment & Plan  Possibly early acute encephalopathy due to uti vs steroid induced vs early dementia  Can be referred for neurology evaluation by PCP    Temporal arteritis [facility])  Assessment & Plan  Biopsy proved temporal arteritis  Ultrasound had been negative  Follows with Ophthalmology  On high dose prednisone which has been slowly tapered outpatient     Type 2 diabetes mellitus with ophthalmic manifestations [facility])  Assessment & Plan  Lab Results   Component Value Date    HGBA1C 6 3 10/27/2022       Recent Labs     12/22/22  1108 12/22/22  1557 12/22/22  2122 12/23/22  0746   POCGLU 186* 182* 215* 65       Blood Sugar Average: Last 72 hrs:  (P) 157   · Had previously been on metformin which has been d/garrett  · Continue diet control    Essential hypertension  Assessment & Plan  · HCTZ resumed at half dose  · Initiated metoprolol due to marked sinus tachycardia which is now resolved      Medical Problems     Resolved Problems  Date Reviewed: 12/23/2022   None       Discharging Physician / Practitioner: Vasu Quiroga MD  PCP: Leeanne Mckeon PA-C  Admission Date:   Admission Orders (From admission, onward)     Ordered        12/20/22 1435  INPATIENT ADMISSION  Once                      Discharge Date: 12/23/22    Consultations During Hospital Stay:  · Pulmonology    Procedures Performed:   CTA chest pe study   Final Result by Jocelyn Lujan MD (12/22 0202)      Pulmonary embolus in the upper and lower lobe pulmonary arteries on the right is noted          Right anterior lower chest wall skin nodule likely a sebaceous cyst  Recommend direct visualization         No CT evidence for right heart strain however recommend correlation with echocardiography  I personally discussed this study with FABI Luna on 12/22/2022 at 2:02 AM       Workstation performed: [id number]         VAS lower limb venous duplex study, complete bilateral   Final Result by Brooke Aguilera MD (12/20 2304)      X-ray chest 2 views   Final Result by Antwon Soni MD (12/20 2039)      No acute cardiopulmonary disease                    Workstation performed: [id number]         CT head without contrast   Final Result by Yessenia Soto Arvell Crigler, MD (12/20 5083)   Addendum (preliminary) 1 of 1 by Alana Coburn MD (12/20 1253)   ADDENDUM:      Trace gas fluid level in the right maxillary sinus attributed to dependent    drainage or sinusitis  Final      No acute intracranial process  No skull fracture  Chronic microangiopathy  Workstation performed: [id number]               Significant Findings / Test Results:   Results from last 7 days   Lab Units 12/23/22  0509   WBC Thousand/uL 7 41   HEMOGLOBIN g/dL 11 3*   HEMATOCRIT % 34 3*   PLATELETS Thousands/uL 205     Results from last 7 days   Lab Units 12/23/22  0509   SODIUM mmol/L 138   POTASSIUM mmol/L 4 1   CHLORIDE mmol/L 104   CO2 mmol/L 29   BUN mg/dL 22   CREATININE mg/dL 0 72   CALCIUM mg/dL 9 3             Test Results Pending at Discharge (will require follow up): · None     Outpatient Tests Requested:  · None    Complications:  None     Reason for Admission: Ambulatory dysfunction    Hospital Course:   Imer Marin is a 81 y o  female patient who originally presented to the hospital on 12/20/2022 due to ambulatory dysfunction  The patient's initial presentation was suspicious for possibility of underlying pulmonary embolism and CT of the chest did confirm this clinical suspicion  The patient was empirically initiated on heparin infusion prior to the confirmed diagnosis and then continued on Eliquis during hospitalization and at discharge  Per PT/OT evaluation arrangements were made for the patient to have home VNA upon her discharge  Prior to her discharge pulmonology evaluated the patient and she will be scheduling outpatient follow-up with pulmonology  The patient was discharged in improved and stable condition  Plan of care was discussed with the patient and her  at the bedside  All questions were answered  Please see above list of diagnoses and related plan for additional information  Condition at Discharge: stable    Discharge Day Visit / Exam:   Subjective: Patient seen and examined  She reports feeling well and would like to be discharged home  No complaints, no overnight events  Vitals: Blood Pressure: 162/59 (12/23/22 0734)  Pulse: 71 (12/23/22 0734)  Temperature: 98 6 °F (37 °C) (12/23/22 0734)  Temp Source: Oral (12/23/22 0734)  Respirations: 16 (12/23/22 0734)  Height: 5' 2" (157 5 cm) (12/22/22 0915)  Weight - Scale: 69 9 kg (154 lb) (12/22/22 0915)  SpO2: 93 % (12/23/22 0734)  Exam:   Physical Exam  Constitutional:       General: She is not in acute distress  HENT:      Head: Normocephalic and atraumatic  Nose: No congestion  Eyes:      Conjunctiva/sclera: Conjunctivae normal    Cardiovascular:      Rate and Rhythm: Normal rate and regular rhythm  Heart sounds: No murmur heard  Pulmonary:      Effort: No respiratory distress  Breath sounds: No wheezing or rales  Abdominal:      General: There is no distension  Tenderness: There is no abdominal tenderness  There is no guarding  Musculoskeletal:      Right lower leg: No edema  Left lower leg: No edema  Skin:     General: Skin is warm and dry  Neurological:      Mental Status: She is oriented to person, place, and time  Psychiatric:         Mood and Affect: Mood normal           Discussion with Family: Updated  () at bedside  Discharge instructions/Information to patient and family:   See after visit summary for information provided to patient and family  Provisions for Follow-Up Care:  See after visit summary for information related to follow-up care and any pertinent home health orders  Disposition:   Home with VNA Services (Reminder: Complete face to face encounter)    Planned Readmission: No     Discharge Statement:  I spent 35 minutes discharging the patient  This time was spent on the day of discharge   I had direct contact with the patient on the day of discharge  Greater than 50% of the total time was spent examining patient, answering all patient questions, arranging and discussing plan of care with patient as well as directly providing post-discharge instructions  Additional time then spent on discharge activities  Discharge Medications:  See after visit summary for reconciled discharge medications provided to patient and/or family        **Please Note: This note may have been constructed using a voice recognition system**

## 2022-12-28 ENCOUNTER — APPOINTMENT (OUTPATIENT)
Dept: LAB | Facility: CLINIC | Age: 82
End: 2022-12-28

## 2022-12-28 DIAGNOSIS — E78.2 MIXED HYPERLIPIDEMIA: ICD-10-CM

## 2022-12-28 DIAGNOSIS — E55.9 VITAMIN D DEFICIENCY: ICD-10-CM

## 2022-12-28 DIAGNOSIS — E11.9 CONTROLLED TYPE 2 DIABETES MELLITUS WITHOUT COMPLICATION, WITHOUT LONG-TERM CURRENT USE OF INSULIN (HCC): ICD-10-CM

## 2022-12-28 LAB
25(OH)D3 SERPL-MCNC: 45 NG/ML (ref 30–100)
ALBUMIN SERPL BCP-MCNC: 3.1 G/DL (ref 3.5–5)
ALP SERPL-CCNC: 48 U/L (ref 46–116)
ALT SERPL W P-5'-P-CCNC: 49 U/L (ref 12–78)
ANION GAP SERPL CALCULATED.3IONS-SCNC: 9 MMOL/L (ref 4–13)
AST SERPL W P-5'-P-CCNC: 15 U/L (ref 5–45)
BILIRUB SERPL-MCNC: 1.12 MG/DL (ref 0.2–1)
BUN SERPL-MCNC: 20 MG/DL (ref 5–25)
CALCIUM ALBUM COR SERPL-MCNC: 10.7 MG/DL (ref 8.3–10.1)
CALCIUM SERPL-MCNC: 10 MG/DL (ref 8.3–10.1)
CHLORIDE SERPL-SCNC: 98 MMOL/L (ref 96–108)
CHOLEST SERPL-MCNC: 202 MG/DL
CO2 SERPL-SCNC: 30 MMOL/L (ref 21–32)
CREAT SERPL-MCNC: 0.84 MG/DL (ref 0.6–1.3)
EST. AVERAGE GLUCOSE BLD GHB EST-MCNC: 151 MG/DL
GFR SERPL CREATININE-BSD FRML MDRD: 65 ML/MIN/1.73SQ M
GLUCOSE P FAST SERPL-MCNC: 112 MG/DL (ref 65–99)
HBA1C MFR BLD: 6.9 %
HDLC SERPL-MCNC: 63 MG/DL
LDLC SERPL CALC-MCNC: 90 MG/DL (ref 0–100)
POTASSIUM SERPL-SCNC: 3.4 MMOL/L (ref 3.5–5.3)
PROT SERPL-MCNC: 6.1 G/DL (ref 6.4–8.4)
SODIUM SERPL-SCNC: 137 MMOL/L (ref 135–147)
TRIGL SERPL-MCNC: 245 MG/DL

## 2023-01-09 ENCOUNTER — RA CDI HCC (OUTPATIENT)
Dept: OTHER | Facility: HOSPITAL | Age: 83
End: 2023-01-09

## 2023-01-09 NOTE — PROGRESS NOTES
[address]  coding opportunities     [id number]     Chart Reviewed number of suggestions sent to Provider: 1     Patients Insurance     Medicare Insurance: [address] [place] [address]

## 2023-01-13 ENCOUNTER — OFFICE VISIT (OUTPATIENT)
Dept: FAMILY MEDICINE CLINIC | Facility: CLINIC | Age: 83
End: 2023-01-13

## 2023-01-13 VITALS
HEART RATE: 128 BPM | BODY MASS INDEX: 28.78 KG/M2 | SYSTOLIC BLOOD PRESSURE: 118 MMHG | OXYGEN SATURATION: 96 % | HEIGHT: 62 IN | WEIGHT: 156.4 LBS | DIASTOLIC BLOOD PRESSURE: 72 MMHG

## 2023-01-13 DIAGNOSIS — M31.6 TEMPORAL ARTERITIS (HCC): Primary | ICD-10-CM

## 2023-01-13 DIAGNOSIS — E78.2 MIXED HYPERLIPIDEMIA: ICD-10-CM

## 2023-01-13 DIAGNOSIS — Z01.818 PREOP TESTING: ICD-10-CM

## 2023-01-13 DIAGNOSIS — E83.52 HYPERCALCEMIA: ICD-10-CM

## 2023-01-13 DIAGNOSIS — Z01.818 PREOP GENERAL PHYSICAL EXAM: ICD-10-CM

## 2023-01-13 DIAGNOSIS — I26.99 PULMONARY EMBOLISM (HCC): ICD-10-CM

## 2023-01-13 DIAGNOSIS — I26.99 OTHER ACUTE PULMONARY EMBOLISM WITHOUT ACUTE COR PULMONALE (HCC): ICD-10-CM

## 2023-01-13 DIAGNOSIS — M85.80 OSTEOPENIA, UNSPECIFIED LOCATION: ICD-10-CM

## 2023-01-13 DIAGNOSIS — K51.40 PSEUDOPOLYPOSIS OF COLON WITHOUT COMPLICATION, UNSPECIFIED PART OF COLON (HCC): ICD-10-CM

## 2023-01-13 DIAGNOSIS — R00.0 SINUS TACHYCARDIA: ICD-10-CM

## 2023-01-13 DIAGNOSIS — F41.9 ANXIETY: ICD-10-CM

## 2023-01-13 DIAGNOSIS — R63.4 WEIGHT LOSS: ICD-10-CM

## 2023-01-13 DIAGNOSIS — I10 ESSENTIAL HYPERTENSION: ICD-10-CM

## 2023-01-13 DIAGNOSIS — E55.9 VITAMIN D DEFICIENCY: ICD-10-CM

## 2023-01-13 DIAGNOSIS — R41.3 MEMORY CHANGES: ICD-10-CM

## 2023-01-13 DIAGNOSIS — E11.311 TYPE 2 DIABETES MELLITUS WITH LEFT EYE AFFECTED BY RETINOPATHY AND MACULAR EDEMA, WITHOUT LONG-TERM CURRENT USE OF INSULIN, UNSPECIFIED RETINOPATHY SEVERITY (HCC): ICD-10-CM

## 2023-01-13 RX ORDER — HYDROCHLOROTHIAZIDE 25 MG/1
25 TABLET ORAL DAILY
Qty: 30 TABLET | Refills: 11 | Status: SHIPPED | OUTPATIENT
Start: 2023-01-13

## 2023-01-13 RX ORDER — METOPROLOL TARTRATE 50 MG/1
50 TABLET, FILM COATED ORAL EVERY 12 HOURS SCHEDULED
Qty: 60 TABLET | Refills: 11 | Status: SHIPPED | OUTPATIENT
Start: 2023-01-13

## 2023-01-13 NOTE — ASSESSMENT & PLAN NOTE
Patient's status post [facility]'s from 12/20 to 12/23 currently on Eliquis 5 mg twice daily  Patient does have pulmonology appointment scheduled for the 24th of this month  Her resting heart rate is still elevated so I will increase her metoprolol to 50 mg twice daily from 25  Patient still feels weak and not back to her baseline prior to admission her  has been doing all of her normal household chores including cooking  She has lost weight over the course of a few months

## 2023-01-13 NOTE — ASSESSMENT & PLAN NOTE
Since the last time I saw the patient in July her weight has gone down from 1720 to 156  Patient has had a lot of medical problems since last time I saw her and since her hospitalization her  has been doing all of the household chores including the cooking and because he is not as good as a cook as her they have actually both lost weight  At this point time her protein and albumin are slightly low so I have encouraged her to bring in more protein and may add protein shakes as well  Recheck in 2 to 3 months

## 2023-01-13 NOTE — PROGRESS NOTES
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, without long-term current use of insulin, unspecified retinopathy severity (HCC)           Subjective      HPI  Review of Systems    Current Outpatient Medications on File Prior to Visit   Medication Sig   • apixaban (Eliquis) 5 mg Take 2 tablets (10 mg total) by mouth 2 (two) times a day for 7 days, THEN 1 tablet (5 mg total) 2 (two) times a day for 23 days  • ascorbic acid (VITAMIN C) 250 mg tablet Take 250 mg by mouth daily   • Cholecalciferol (VITAMIN D) 2000 units CAPS Take 1 capsule by mouth 2 (two) times a day   • hydrochlorothiazide (HYDRODIURIL) 25 mg tablet Take 1 tablet (25 mg total) by mouth daily   • latanoprost (XALATAN) 0 005 % ophthalmic solution    • metoprolol tartrate (LOPRESSOR) 25 mg tablet Take 1 tablet (25 mg total) by mouth every 12 (twelve) hours   • pantoprazole (PROTONIX) 40 mg tablet Take 1 tablet (40 mg total) by mouth daily in the early morning Do not start before 2022  • pravastatin (PRAVACHOL) 10 mg tablet TAKE 1 TABLET BY MOUTH EVERYDAY AT BEDTIME   • predniSONE 10 mg tablet Take 5 tablets (50 mg total) by mouth daily for 14 days, THEN 4 tablets (40 mg total) daily for 14 days, THEN 3 tablets (30 mg total) daily for 14 days, THEN 2 tablets (20 mg total) daily for 14 days, THEN 1 tablet (10 mg total) daily for 28 days     • ALPRAZolam (XANAX) 0 25 mg tablet Take 1 tablet (0 25 mg total) by mouth daily as needed for anxiety   • Biotin 93768 MCG TABS Take by mouth   (Patient not taking: Reported on 2023) Objective     /72 (BP Location: Left arm, Patient Position: Sitting, Cuff Size: Standard)   Pulse (!) 128   Ht 5' 2" (1 575 m)   Wt 70 9 kg (156 lb 6 4 oz)   SpO2 96%   BMI 28 61 kg/m²     Physical Exam  Roberto Carlos Almonte PA-C

## 2023-01-13 NOTE — ASSESSMENT & PLAN NOTE
Patient was started on metoprolol tartrate 25 mg twice daily upon admission  Today for patient's resting heart rate is 128 and therefore I have increased metoprolol to 50 mg twice daily

## 2023-01-13 NOTE — ASSESSMENT & PLAN NOTE
Vitamin D is normal calcium continues to be slightly elevated PTH and phosphorus in the past normal

## 2023-01-13 NOTE — ASSESSMENT & PLAN NOTE
Discovered and discussed during admission thought to be either secondary to acute UTI or steroids follow-up to be as outpatient  Patient states that memory feels so much better  states that she is back to her baseline but still has mild memory issues however continues to do COVID and crypto grams wonderfully  Will observe

## 2023-01-13 NOTE — ASSESSMENT & PLAN NOTE
Patient continues on prednisone taper at this point she is on her final month of 10 mg daily and should be finished after that  Patient states that she still can only see partial vision out of her left eye seeing from bottom up horizontally residential  She states that sometimes by the end of the day this does go away and she can see normally however  Continue to follow-up with ophthalmology

## 2023-01-13 NOTE — PATIENT INSTRUCTIONS
1  [facility] )  Assessment & Plan:  Patient continues on prednisone taper at this point she is on her final month of 10 mg daily and should be finished after that  Orders:  -     TSH, 3rd generation with Free T4 reflex; Future; Expected date: 07/12/2023    2  Essential hypertension  Assessment & Plan:  Stable on current regimen  3  Osteopenia, unspecified location  Assessment & Plan:  DEXA due this year order reprinted  4  Vitamin D deficiency  Assessment & Plan:  Vitamin D is normal 45 continue supplementation check yearly  5  Mixed hyperlipidemia  Assessment & Plan:  Patient is on Pravachol 10 mg once daily keeping her LDL at goal at 90 continue recheck with next labs  Orders:  -     Lipid Panel with Direct LDL reflex; Future; Expected date: 07/12/2023    6  Anxiety  Assessment & Plan:  Patient has a prescription for Xanax that she uses very infrequently  7  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, without long-term current use of insulin, unspecified retinopathy severity ([facility] )  Assessment & Plan:  Patient is very well controlled with an A1c of 6 9 and a fasting sugar of 112  No change recheck 6 months with this  Lab Results   Component Value Date    HGBA1C 6 9 (H) 12/28/2022       Orders:  -     Hemoglobin A1C; Future; Expected date: 07/12/2023  -     Comprehensive metabolic panel; Future; Expected date: 07/12/2023  -     Microalbumin / creatinine urine ratio; Future; Expected date: 07/12/2023    8  Other acute pulmonary embolism without acute cor pulmonale ([facility] [address] )  Assessment & Plan:  Patient's status post shin [facility] from 12/20 to 12/23 currently on Eliquis 5 mg twice daily  Patient does have pulmonology appointment scheduled for the 24th of this month  Orders:  -     TSH, 3rd generation with Free T4 reflex; Future; Expected date: 07/12/2023    9   Hypercalcemia  Assessment & Plan:  Vitamin D is normal calcium continues to be slightly elevated PTH and phosphorus in the past normal       10  Memory changes  Assessment & Plan:  Discovered and discussed during admission thought to be either secondary to acute UTI or steroids follow-up to be as outpatient

## 2023-01-13 NOTE — ASSESSMENT & PLAN NOTE
Patient is on Pravachol 10 mg once daily keeping her LDL at goal at 90 continue recheck with next labs

## 2023-01-13 NOTE — PROGRESS NOTES
Assessment & Plan     1  Temporal arteritis ([facility] [address] )  Assessment & Plan:  Patient continues on prednisone taper at this point she is on her final month of 10 mg daily and should be finished after that  Patient states that she still can only see partial vision out of her left eye seeing from bottom up horizontally half-way  She states that sometimes by the end of the day this does go away and she can see normally however  Continue to follow-up with ophthalmology  Orders:  -     TSH, 3rd generation with Free T4 reflex; Future; Expected date: 07/12/2023    2  Essential hypertension  Assessment & Plan:  Stable on current regimen  Orders:  -     hydrochlorothiazide (HYDRODIURIL) 25 mg tablet; Take 1 tablet (25 mg total) by mouth daily    3  Osteopenia, unspecified location  Assessment & Plan:  DEXA due this year order reprinted  4  Vitamin D deficiency  Assessment & Plan:  Vitamin D is normal 45 continue supplementation check yearly  5  Mixed hyperlipidemia  Assessment & Plan:  Patient is on Pravachol 10 mg once daily keeping her LDL at goal at 90 continue recheck with next labs  Orders:  -     Lipid Panel with Direct LDL reflex; Future; Expected date: 07/12/2023    6  Anxiety  Assessment & Plan:  Patient has a prescription for Xanax that she uses very infrequently  7  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 [address] )  Assessment & Plan:  Patient is very well controlled with an A1c of 6 9 and a fasting sugar of 112  No change recheck 6 months with this  Lab Results   Component Value Date    HGBA1C 6 9 (H) 12/28/2022       Orders:  -     Hemoglobin A1C; Future; Expected date: 07/12/2023  -     Comprehensive metabolic panel; Future; Expected date: 07/12/2023  -     Microalbumin / creatinine urine ratio; Future; Expected date: 07/12/2023    8   Other acute pulmonary embolism without acute cor pulmonale ([facility] [address] )  Assessment & Plan:  Patient's status post shin [facility] from 12/20 to 12/23 currently on Eliquis 5 mg twice daily  Patient does have pulmonology appointment scheduled for the 24th of this month  Her resting heart rate is still elevated so I will increase her metoprolol to 50 mg twice daily from 25  Patient still feels weak and not back to her baseline prior to admission her  has been doing all of her normal household chores including cooking  She has lost weight over the course of a few months  Orders:  -     TSH, 3rd generation with Free T4 reflex; Future; Expected date: 07/12/2023    9  Hypercalcemia  Assessment & Plan:  Vitamin D is normal calcium continues to be slightly elevated PTH and phosphorus in the past normal     Orders:  -     Comprehensive metabolic panel; Future    10  Memory changes  Assessment & Plan:  Discovered and discussed during admission thought to be either secondary to acute UTI or steroids follow-up to be as outpatient  Patient states that memory feels so much better  states that she is back to her baseline but still has mild memory issues however continues to do COVID and crypto grams wonderfully  Will observe  11  Pulmonary embolism (HCC)  -     apixaban (Eliquis) 5 mg; Take 1 tablet (5 mg total) by mouth 2 (two) times a day    12  Sinus tachycardia  Assessment & Plan:  Patient was started on metoprolol tartrate 25 mg twice daily upon admission  Today for patient's resting heart rate is 128 and therefore I have increased metoprolol to 50 mg twice daily  Orders:  -     metoprolol tartrate (LOPRESSOR) 50 mg tablet; Take 1 tablet (50 mg total) by mouth every 12 (twelve) hours    13  Preop general physical exam  -     hydrochlorothiazide (HYDRODIURIL) 25 mg tablet; Take 1 tablet (25 mg total) by mouth daily    14  Preop testing  -     hydrochlorothiazide (HYDRODIURIL) 25 mg tablet; Take 1 tablet (25 mg total) by mouth daily    15  Pseudopolyposis of colon without complication, unspecified part of colon ([address] )    16  Weight loss  Assessment & Plan:  Since the last time I saw the patient in July her weight has gone down from 1720 to 156  Patient has had a lot of medical problems since last time I saw her and since her hospitalization her  has been doing all of the household chores including the cooking and because he is not as good as a cook as her they have actually both lost weight  At this point time her protein and albumin are slightly low so I have encouraged her to bring in more protein and may add protein shakes as well  Recheck in 2 to 3 months  BMI Counseling: Body mass index is 28 61 kg/m²  The BMI is above normal  Nutrition recommendations include decreasing portion sizes, encouraging healthy choices of fruits and vegetables, decreasing fast food intake, consuming healthier snacks, limiting drinks that contain sugar, moderation in carbohydrate intake, increasing intake of lean protein, reducing intake of saturated and trans fat and reducing intake of cholesterol  Exercise recommendations include exercising 3-5 times per week  No pharmacotherapy was ordered  Rationale for BMI follow-up plan is due to patient being overweight or obese  Subjective     Transitional Care Management Review:   Medina Bhat is a 81 y o  female here for TCM follow up       During the TCM phone call patient stated:  TCM Call     Date and time call was made  12/23/2022  3:01 PM    Hospital care reviewed  Records reviewed    Patient was hospitialized at  [address]    Date of Admission  12/20/22    Date of discharge  12/23/22    Diagnosis  Acute pulmonary embolism without acute cor pulmonale    Disposition  Home      TCM Call     I have advised the patient to call PCP with any new or worsening symptoms  [facility]          Medina Bhat is here for chronic conditions f/u including the diagnosis of Temporal arteritis (hcc) (primary encounter diagnosis)  Essential hypertension  Osteopenia, unspecified location  Vitamin d deficiency  Mixed hyperlipidemia  Anxiety  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, without long-term current use of insulin, unspecified retinopathy severity (hcc)   Pt  states they are taking all medications as directed without complaints or side effects   Pt  had labs done prior to today's visit which included Recent Results (from the past 672 hour(s))  -ECG 12 lead:   Collection Time: 12/20/22 11:33 AM       Result                      Value             Ref Range           Ventricular Rate            90                BPM                 Atrial Rate                 90                BPM                 NC Interval                 130               ms                  QRSD Interval               76                ms                  QT Interval                 328               ms                  QTC Interval                401               ms                  P Axis                      69                degrees             QRS Axis                    29                degrees             T Wave Axis                 75                degrees        -UA w Reflex to Microscopic w Reflex to Culture:   Collection Time: 12/20/22 12:28 PM  Specimen: Urine, Other       Result                      Value             Ref Range           Color, UA                   Yellow                                Clarity, UA                                                   Slightly Cloudy       Specific [place], UA        1 020             1 003 - 1 030       pH, UA                      7 5               4 5, 5 0, 5 *       Leukocytes, UA              Negative          Negative            Nitrite, UA                 Negative          Negative            Protein, UA                 Negative          Negative mg/*       Glucose, UA                 Negative          Negative mg/*       Ketones, UA Negative          Negative mg/*       Urobilinogen, UA            1 0               0 2, 1 0 E U*       Bilirubin, UA               Negative          Negative            Occult Blood, UA            Large (A)         Negative       -Urine Microscopic:   Collection Time: 12/20/22 12:28 PM       Result                      Value             Ref Range           RBC, UA                     30-50 (A)         None Seen, 2*       WBC, UA                     None Seen         None Seen, 2*       Epithelial Cells            None Seen         None Seen, O*       Bacteria, UA                Occasional        None Seen, O*  -Urine culture:   Collection Time: 12/20/22 12:28 PM  Specimen: Urine, Other       Result                      Value             Ref Range           Urine Culture                                                 10,000-19,000 cfu/ml  -CBC and differential:   Collection Time: 12/20/22 12:29 PM       Result                      Value             Ref Range           WBC                         5 28              4 31 - 10 16*       RBC                         4 19              3 81 - 5 12 *       Hemoglobin                  12 3              11 5 - 15 4 *       Hematocrit                  36 4              34 8 - 46 1 %       MCV                         87                82 - 98 fL          MCH                         29 4              26 8 - 34 3 *       MCHC                        33 8              31 4 - 37 4 *       RDW                         16 0 (H)          11 6 - 15 1 %       MPV                         9 6               8 9 - 12 7 fL       Platelets                   181               149 - 390 Th*  -HS Troponin 0hr (reflex protocol):   Collection Time: 12/20/22 12:29 PM       Result                      Value             Ref Range           hs TnI 0hr                  33                "Refer to [facility]*  -Comprehensive metabolic panel:   Collection Time: 12/20/22 12:29 PM       Result Value             Ref Range           Sodium                      139               135 - 147 mm*       Potassium                   3 3 (L)           3 5 - 5 3 mm*       Chloride                    100               96 - 108 mmo*       CO2                         30                21 - 32 mmol*       ANION GAP                   9                 4 - 13 mmol/L       BUN                         19                5 - 25 mg/dL        Creatinine                  0 72              0 60 - 1 30 *       Glucose                     126               65 - 140 mg/*       Calcium                     9 7               8 3 - 10 1 m*       Corrected Calcium           10 6 (H)          8 3 - 10 1 m*       AST                         18                5 - 45 U/L          ALT                         50                12 - 78 U/L         Alkaline Phosphatase        50                46 - 116 U/L        Total Protein               6 2 (L)           6 4 - 8 4 g/*       Albumin                     2 9 (L)           3 5 - 5 0 g/*       Total Bilirubin             1 29 (H)          0 20 - 1 00 *       eGFR                        78                ml/min/1 73s*  -Manual Differential(PHLEBS Do Not Order):   Collection Time: 12/20/22 12:29 PM       Result                      Value             Ref Range           Segmented %                 86 (H)            43 - 75 %           Bands %                     1                 0 - 8 %             Lymphocytes %               6 (L)             14 - 44 %           Monocytes %                 7                 4 - 12 %            Eosinophils, %              0                 0 - 6 %             Basophils %                 0                 0 - 1 %             Absolute Neutrophils        4 59              1 85 - 7 62 *       Lymphocytes Absolute        0 32 (L)          0 60 - 4 47 *       Monocytes Absolute          0 37              0 00 - 1 22 *       Eosinophils Absolute        0 00 0 00 - 0 40 *       Basophils Absolute          0 00              0 00 - 0 10 *       Total Counted                                                     Anisocytosis                Present                               Ovalocytes                  Present                               Platelet Estimate           Adequate          Adequate       -ECG 12 lead:   Collection Time: 12/20/22 12:51 PM       Result                      Value             Ref Range           Ventricular Rate            89                BPM                 Atrial Rate                 99                BPM                 IN Interval                 128               ms                  QRSD Interval               80                ms                  QT Interval                 346               ms                  QTC Interval                420               ms                  P Axis                      73                degrees             QRS Axis                    31                degrees             T Wave Axis                 76                degrees        -ECG 12 lead:   Collection Time: 12/20/22  2:30 PM       Result                      Value             Ref Range           Ventricular Rate            97                BPM                 Atrial Rate                 97                BPM                 IN Interval                                   ms                  QRSD Interval               84                ms                  QT Interval                 348               ms                  QTC Interval                441               ms                  P Axis                      67                degrees             QRS Axis                    30                degrees             T Wave Axis                 90                degrees        -HS Troponin I 2hr:   Collection Time: 12/20/22  2:35 PM       Result                      Value             Ref Range           hs TnI 2hr                  37 "Refer to [facility]*       Delta 2hr hsTnI             4                 <20 ng/L       -D-dimer, quantitative:   Collection Time: 12/20/22  2:35 PM       Result                      Value             Ref Range           D-Dimer, Quant              1  84 (H)          <0 50 ug/ml *  -HS Troponin I 4hr:   Collection Time: 12/20/22  6:14 PM       Result                      Value             Ref Range           hs TnI 4hr                  40                "Refer to [facility]*       Delta 4hr hsTnI             7                 <20 ng/L       -APTT:   Collection Time: 12/20/22  6:14 PM       Result                      Value             Ref Range           PTT                         20 (L)            23 - 37 seco*  -Protime-INR:   Collection Time: 12/20/22  6:14 PM       Result                      Value             Ref Range           Protime                     13 6              11 6 - 14 5 *       INR                         1 04              0 84 - 1 19    -ECG 12 lead:   Collection Time: 12/20/22  6:14 PM       Result                      Value             Ref Range           Ventricular Rate            94                BPM                 Atrial Rate                 84                BPM                 CT Interval                 134               ms                  QRSD Interval               78                ms                  QT Interval                 336               ms                  QTC Interval                420               ms                  P Axis                      76                degrees             QRS Axis                    33                degrees             T Wave Axis                 86                degrees        -COVID/FLU/RSV:   Collection Time: 12/20/22  6:36 PM  Specimen: Nose; Nares       Result                      Value             Ref Range           SARS-CoV-2                  Negative          Negative            INFLUENZA A PCR             Negative          Negative INFLUENZA B PCR             Negative          Negative            RSV PCR                     Negative          Negative       -Fingerstick Glucose (POCT):   Collection Time: 12/20/22  7:10 PM       Result                      Value             Ref Range           POC Glucose                 156 (H)           65 - 140 mg/*  -Fingerstick Glucose (POCT):   Collection Time: 12/20/22  8:57 PM       Result                      Value             Ref Range           POC Glucose                 195 (H)           65 - 140 mg/*  -TSH, 3rd generation with Free T4 reflex:   Collection Time: 12/20/22  9:00 PM       Result                      Value             Ref Range           TSH 3RD GENERATON           0 638             0 450 - 4 50*  -Fingerstick Glucose (POCT):   Collection Time: 12/21/22 12:56 AM       Result                      Value             Ref Range           POC Glucose                 100               65 - 140 mg/*  -APTT:   Collection Time: 12/21/22  2:29 AM       Result                      Value             Ref Range           PTT                         78 (H)            23 - 37 seco*  -Fingerstick Glucose (POCT):   Collection Time: 12/21/22  7:10 AM       Result                      Value             Ref Range           POC Glucose                 67                65 - 140 mg/*  -Basic metabolic panel:   Collection Time: 12/21/22  8:05 AM       Result                      Value             Ref Range           Sodium                      140               135 - 147 mm*       Potassium                   3 0 (L)           3 5 - 5 3 mm*       Chloride                    101               96 - 108 mmo*       CO2                         28                21 - 32 mmol*       ANION GAP                   11                4 - 13 mmol/L       BUN                         15                5 - 25 mg/dL        Creatinine                  0 74              0 60 - 1 30 *       Glucose                     71 65 - 140 mg/*       Calcium                     9 1               8 3 - 10 1 m*       eGFR                        76                ml/min/1 73s*  -Magnesium:   Collection Time: 12/21/22  8:05 AM       Result                      Value             Ref Range           Magnesium                   1 3 (L)           1 6 - 2 6 mg*  -T4, free:   Collection Time: 12/21/22  8:05 AM       Result                      Value             Ref Range           Free T4                     1 78 (H)          0 76 - 1 46 *  -CBC (With Platelets):   Collection Time: 12/21/22  8:23 AM       Result                      Value             Ref Range           WBC                         4 92              4 31 - 10 16*       RBC                         3 76 (L)          3 81 - 5 12 *       Hemoglobin                  10 9 (L)          11 5 - 15 4 *       Hematocrit                  32 9 (L)          34 8 - 46 1 %       MCV                         88                82 - 98 fL          MCH                         29 0              26 8 - 34 3 *       MCHC                        33 1              31 4 - 37 4 *       RDW                         16 4 (H)          11 6 - 15 1 %       Platelets                   173               149 - 390 Th*       MPV                         9 9               8 9 - 12 7 fL  -Platelet count:   Collection Time: 12/21/22  9:10 AM       Result                      Value             Ref Range           Platelets                   178               149 - 390 Th*       MPV                         9 4               8 9 - 12 7 fL  -APTT:   Collection Time: 12/21/22  9:10 AM       Result                      Value             Ref Range           PTT                         108 (H)           23 - 37 seco*  -ECG 12 lead:   Collection Time: 12/21/22  9:58 AM       Result                      Value             Ref Range           Ventricular Rate            140               BPM                 Atrial Rate 140               BPM                 WI Interval                 152               ms                  QRSD Interval               84                ms                  QT Interval                 372               ms                  QTC Interval                567               ms                  P Axis                      29                degrees             QRS Axis                    26                degrees             T Wave Axis                 82                degrees        -Fingerstick Glucose (POCT):   Collection Time: 12/21/22 11:03 AM       Result                      Value             Ref Range           POC Glucose                 119               65 - 140 mg/*  -APTT:   Collection Time: 12/21/22  4:29 PM       Result                      Value             Ref Range           PTT                         >210 (HH)         23 - 37 seco*  -Fingerstick Glucose (POCT):   Collection Time: 12/21/22  4:42 PM       Result                      Value             Ref Range           POC Glucose                 210 (H)           65 - 140 mg/*  -Fingerstick Glucose (POCT):   Collection Time: 12/21/22  9:01 PM       Result                      Value             Ref Range           POC Glucose                 233 (H)           65 - 140 mg/*  -APTT:   Collection Time: 12/22/22 12:07 AM       Result                      Value             Ref Range           PTT                         70 (H)            23 - 37 seco*  -APTT:   Collection Time: 12/22/22  5:57 AM       Result                      Value             Ref Range           PTT                         81 (H)            23 - 37 seco*  -CBC and differential:   Collection Time: 12/22/22  5:57 AM       Result                      Value             Ref Range           WBC                         4 33              4 31 - 10 16*       RBC                         3 53 (L)          3 81 - 5 12 *       Hemoglobin                  10 3 (L)          11 5 - 15 4 *       Hematocrit                  31 2 (L)          34 8 - 46 1 %       MCV                         88                82 - 98 fL          MCH                         29 2              26 8 - 34 3 *       MCHC                        33 0              31 4 - 37 4 *       RDW                         16 5 (H)          11 6 - 15 1 %       MPV                         9 3               8 9 - 12 7 fL       Platelets                   159               149 - 390 Th*  -Basic metabolic panel:   Collection Time: 12/22/22  5:57 AM       Result                      Value             Ref Range           Sodium                      138               135 - 147 mm*       Potassium                   4 5               3 5 - 5 3 mm*       Chloride                    104               96 - 108 mmo*       CO2                         25                21 - 32 mmol*       ANION GAP                   9                 4 - 13 mmol/L       BUN                         13                5 - 25 mg/dL        Creatinine                  0 69              0 60 - 1 30 *       Glucose                     170 (H)           65 - 140 mg/*       Calcium                     8 5               8 3 - 10 1 m*       eGFR                        81                ml/min/1 73s*  -Magnesium:   Collection Time: 12/22/22  5:57 AM       Result                      Value             Ref Range           Magnesium                   1 9               1 6 - 2 6 mg*  -NT-BNP PRO:   Collection Time: 12/22/22  5:57 AM       Result                      Value             Ref Range           NT-proBNP                   324               <450 pg/mL     -Fingerstick Glucose (POCT):   Collection Time: 12/22/22  7:30 AM       Result                      Value             Ref Range           POC Glucose                 156 (H)           65 - 140 mg/*  -Echo complete w/ contrast if indicated:   Collection Time: 12/22/22  9:49 AM       Result                      Value Ref Range           A4C EF                      66                %                   LVOT stroke volume          59 37                                 LVOT stroke volume ind*     36 30             ml/m2               LVIDd                       3 60              cm                  LVIDS                       2 30              cm                  IVSd                        1  10              cm                  LVPWd                       1  10              cm                  FS                          36                28 - 44             MV E' Tissue Velocity *     9                 cm/s                MV E' Tissue Velocity *     9                 cm/s                E wave deceleration ti*     389               ms                  MV Peak E Arthur               88                cm/s                MV Peak A Arthur               1 11              m/s                 AV LVOT peak gradient       3                 mmHg                LVOT peak VTI               20 95             cm                  LVOT peak arthur               0 85              m/s                 RVID d                      3 1               cm                  LA size                     4 2               cm                  LA length (A2C)             5 10              cm                  RA 2D Volume                38 0              mL                  RAA A4C                     15 9              cm2                 LVOT diameter               1 9               cm                  Aortic valve peak velo*     2 13              m/s                 Ao VTI                      49 04             cm                  AV mean gradient            10                mmHg                LVOT mn grad                2 0               mmHg                AV peak gradient            18                mmHg                AV area by cont VTI         1 2               cm2                 AV area peak arthur            1 1               cm2                 MV stenosis pressure 1*     113               ms                  MV valve area p 1/2 me*     1 95                                  TR Peak Arthur                 3 4               m/s                 Triscuspid Valve Regur*     47 0              mmHg                Ao root                     3 20              cm                  Asc Ao                      3 6               cm                  Aortic valve mean velo*     14 60             m/s                 Tricuspid valve peak r*     3 42              m/s                 Left ventricular strok*     37 00             mL                  IVS                         1 1               cm                  LEFT VENTRICLE SYSTOLI*     17                mL                  LV DIASTOLIC VOLUME (M*     54                mL                  Left Atrium Area-systo*     17                cm2                 Left Atrium Area-systo*     17 6              cm2                 LVSV, 2D                    37                mL                  LVOT area                   2 83              cm2                 Dimensionless velociy *     0 40                                  AV valve area               1 21              cm2                 LV EF                       75                                    Est  RA pres                3 0               mmHg                Right Ventricular Peak*     50 00             mmHg           -Fingerstick Glucose (POCT):   Collection Time: 12/22/22 11:08 AM       Result                      Value             Ref Range           POC Glucose                 186 (H)           65 - 140 mg/*  -Fingerstick Glucose (POCT):   Collection Time: 12/22/22  3:57 PM       Result                      Value             Ref Range           POC Glucose                 182 (H)           65 - 140 mg/*  -Fingerstick Glucose (POCT):   Collection Time: 12/22/22  9:22 PM       Result                      Value             Ref Range           POC Glucose                 215 (H) 65 - 140 mg/*  -APTT:   Collection Time: 12/23/22  5:09 AM       Result                      Value             Ref Range           PTT                         26                23 - 37 seco*  -T3:   Collection Time: 12/23/22  5:09 AM       Result                      Value             Ref Range           T3, Total                   0 60              0 60 - 1 80 *  -CBC and differential:   Collection Time: 12/23/22  5:09 AM       Result                      Value             Ref Range           WBC                         7 41              4 31 - 10 16*       RBC                         3 86              3 81 - 5 12 *       Hemoglobin                  11 3 (L)          11 5 - 15 4 *       Hematocrit                  34 3 (L)          34 8 - 46 1 %       MCV                         89                82 - 98 fL          MCH                         29 3              26 8 - 34 3 *       MCHC                        32 9              31 4 - 37 4 *       RDW                         17 2 (H)          11 6 - 15 1 %       MPV                         9 6               8 9 - 12 7 fL       Platelets                   205               149 - 390 Th*       nRBC                        0                 /100 WBCs           Neutrophils Relative        68                43 - 75 %           Immat GRANS %               8 (H)             0 - 2 %             Lymphocytes Relative        16                14 - 44 %           Monocytes Relative          7                 4 - 12 %            Eosinophils Relative        0                 0 - 6 %             Basophils Relative          1                 0 - 1 %             Neutrophils Absolute        5 08              1 85 - 7 62 *       Immature Grans Absolute     >0 50 (H)         0 00 - 0 20 *       Lymphocytes Absolute        1 17              0 60 - 4 47 *       Monocytes Absolute          0 52              0 17 - 1 22 *       Eosinophils Absolute        0 02              0 00 - 0 61 * Basophils Absolute          0 06              0 00 - 0 10 *  -Basic metabolic panel:   Collection Time: 12/23/22  5:09 AM       Result                      Value             Ref Range           Sodium                      138               135 - 147 mm*       Potassium                   4 1               3 5 - 5 3 mm*       Chloride                    104               96 - 108 mmo*       CO2                         29                21 - 32 mmol*       ANION GAP                   5                 4 - 13 mmol/L       BUN                         22                5 - 25 mg/dL        Creatinine                  0 72              0 60 - 1 30 *       Glucose                     73                65 - 140 mg/*       Calcium                     9 3               8 3 - 10 1 m*       eGFR                        78                ml/min/1 73s*  -Magnesium:   Collection Time: 12/23/22  5:09 AM       Result                      Value             Ref Range           Magnesium                   2 0               1 6 - 2 6 mg*  -Fingerstick Glucose (POCT):   Collection Time: 12/23/22  7:46 AM       Result                      Value             Ref Range           POC Glucose                 65                65 - 140 mg/*  -Comprehensive metabolic panel:   Collection Time: 12/28/22  8:50 AM       Result                      Value             Ref Range           Sodium                      137               135 - 147 mm*       Potassium                   3 4 (L)           3 5 - 5 3 mm*       Chloride                    98                96 - 108 mmo*       CO2                         30                21 - 32 mmol*       ANION GAP                   9                 4 - 13 mmol/L       BUN                         20                5 - 25 mg/dL        Creatinine                  0 84              0 60 - 1 30 *       Glucose, Fasting            112 (H)           65 - 99 mg/dL       Calcium                     10 0 8 3 - 10 1 m*       Corrected Calcium           10 7 (H)          8 3 - 10 1 m*       AST                         15                5 - 45 U/L          ALT                         49                12 - 78 U/L         Alkaline Phosphatase        48                46 - 116 U/L        Total Protein               6 1 (L)           6 4 - 8 4 g/*       Albumin                     3 1 (L)           3 5 - 5 0 g/*       Total Bilirubin             1 12 (H)          0 20 - 1 00 *       eGFR                        65                ml/min/1 73s*  -Hemoglobin A1C:   Collection Time: 12/28/22  8:50 AM       Result                      Value             Ref Range           Hemoglobin A1C              6 9 (H)           Normal 3 8-5*       EAG                         151               mg/dl          -Lipid Panel with Direct LDL reflex:   Collection Time: 12/28/22  8:50 AM       Result                      Value             Ref Range           Cholesterol                 202 (H)           See Comment *       Triglycerides               245 (H)           See Comment *       HDL, Direct                 63                >=50 mg/dL          LDL Calculated              90                0 - 100 mg/dL  -Vitamin D 25 hydroxy:   Collection Time: 12/28/22  8:50 AM       Result                      Value             Ref Range           Vit D, 25-Hydroxy           45 0              30 0 - 100 0*      Review of Systems   Constitutional: Negative  HENT: Negative  Eyes: Negative  Respiratory: Negative  Cardiovascular: Negative  Gastrointestinal: Negative  Endocrine: Negative  Genitourinary: Negative  Musculoskeletal: Negative  Skin: Negative  Allergic/Immunologic: Negative  Neurological: Negative  Hematological: Negative  Psychiatric/Behavioral: Negative          Objective     /72 (BP Location: Left arm, Patient Position: Sitting, Cuff Size: Standard)   Pulse (!) 128   Ht 5' 2" (1 575 m)   Wt 70 9 kg (156 lb 6 4 oz)   SpO2 96%   BMI 28 61 kg/m²      Physical Exam  Vitals and nursing note reviewed  Constitutional:       Appearance: Normal appearance  She is well-developed  HENT:      Head: Normocephalic and atraumatic  Eyes:      General: Lids are normal       Conjunctiva/sclera: Conjunctivae normal       Pupils: Pupils are equal, round, and reactive to light  Cardiovascular:      Rate and Rhythm: Regular rhythm  Tachycardia present  Heart sounds: No murmur heard  Pulmonary:      Effort: Pulmonary effort is normal       Breath sounds: Normal breath sounds  Musculoskeletal:      Right lower leg: Edema present  Left lower leg: Edema present  Comments: She ambulated with a walker today which is not normal baseline for her  Skin:     General: Skin is warm and dry  Neurological:      General: No focal deficit present  Mental Status: She is alert  Coordination: Coordination is intact  Psychiatric:         Mood and Affect: Mood normal          Behavior: Behavior normal  Behavior is cooperative  Thought Content:  Thought content normal          Judgment: Judgment normal        Medications have been reviewed by provider in current encounter    Ramiro Champagne PA-C

## 2023-01-13 NOTE — ASSESSMENT & PLAN NOTE
Patient is very well controlled with an A1c of 6 9 and a fasting sugar of 112  No change recheck 6 months with this    Lab Results   Component Value Date    HGBA1C 6 9 (H) 12/28/2022

## 2023-01-24 ENCOUNTER — OFFICE VISIT (OUTPATIENT)
Dept: PULMONOLOGY | Facility: CLINIC | Age: 83
End: 2023-01-24

## 2023-01-24 ENCOUNTER — TELEPHONE (OUTPATIENT)
Dept: FAMILY MEDICINE CLINIC | Facility: CLINIC | Age: 83
End: 2023-01-24

## 2023-01-24 VITALS
DIASTOLIC BLOOD PRESSURE: 58 MMHG | WEIGHT: 157 LBS | SYSTOLIC BLOOD PRESSURE: 112 MMHG | HEART RATE: 65 BPM | OXYGEN SATURATION: 99 % | HEIGHT: 62 IN | BODY MASS INDEX: 28.89 KG/M2

## 2023-01-24 DIAGNOSIS — M31.6 TEMPORAL ARTERITIS (HCC): ICD-10-CM

## 2023-01-24 DIAGNOSIS — O22.30 DVT (DEEP VEIN THROMBOSIS) IN PREGNANCY: ICD-10-CM

## 2023-01-24 DIAGNOSIS — I26.99 OTHER ACUTE PULMONARY EMBOLISM WITHOUT ACUTE COR PULMONALE (HCC): Primary | ICD-10-CM

## 2023-01-24 DIAGNOSIS — R06.09 DYSPNEA ON EXERTION: ICD-10-CM

## 2023-01-24 NOTE — TELEPHONE ENCOUNTER
Should Patient continue taking her Prednisone 10 mg she seen the Pulmonary today and they said maybe she shouldn't be on it   states he has a lot at home  He thinks he has 2 prescription of if   Please call him so they know if she should take it and for how long

## 2023-01-24 NOTE — PROGRESS NOTES
Office Progress Note - Pulmonary    Abdulaziz Wang 81 y o  female MRN: [id number]    Encounter: [id number]      Assessment:  • Acute pulmonary embolism  • DVT  • Dyspnea on exertion  • Temporal arteritis  Plan:   • Eliquis 5 mg p o  twice daily  • Regular walks and exercise to improve stamina  • Follow-up in 5 months  Discussion:   The patient's acute pulmonary embolism and DVT was secondary to her sedentary lifestyle  Fortunately she did not have right heart strain  Overall her symptoms have markedly improved  This includes the dyspnea on exertion  I have maintained her on the Eliquis 5 mg p o  twice daily  We will continue this for 5 more months and then reevaluate her  Most likely will stop the Eliquis after 5 months  I have encouraged her to take regular walks and to exercise to improve stamina  I will see her in 5 months and a follow-up visit  Subjective: The patient is here for a follow-up visit  She was admitted in December with DVT and acute pulmonary embolism  She was initially started on heparin and then transition to Eliquis  Since discharge she has been feeling better  She is improving strength wise and ability to do things  Also her shortness of breath has improved  She has no nocturnal symptoms  She is taking Eliquis twice a day  She has no bleeding from anywhere  She still on the prednisone for her temporal arteritis  Review of systems:  A 12 point system review is done and aside from what is stated above the rest of the review of systems is negative  Family history and social history are reviewed  Medications list is reviewed  Vitals: Blood pressure 112/58, pulse 65, height 5' 2" (1 575 m), weight 71 2 kg (157 lb), SpO2 99 %  ,     Physical Exam  Gen: Awake, alert, oriented x 3, no acute distress  HEENT: Mucous membranes moist, no oral lesions, no thrush  NECK: No accessory muscle use, JVP not elevated  Cardiac: Regular, single S1, single S2, no murmurs, no rubs, no gallops  Lungs: Clear breath sounds  No wheezing or rhonchi  Abdomen: normoactive bowel sounds, soft nontender, nondistended, no rebound or rigidity, no guarding  Extremities: no cyanosis, no clubbing, no edema  Neuro:  Grossly nonfocal   Skin:  No rash  CT of the chest which was done in December is reviewed on the PACS system  It showed filling defects in the right upper and lower pulmonary artery branches consistent with acute pulmonary embolism  2D echo report is reviewed  RV with normal size and function      Lab Results   Component Value Date    WBC 7 41 12/23/2022    HGB 11 3 (L) 12/23/2022    HCT 34 3 (L) 12/23/2022    MCV 89 12/23/2022     12/23/2022     Lab Results   Component Value Date    SODIUM 137 12/28/2022    K 3 4 (L) 12/28/2022    CL 98 12/28/2022    CO2 30 12/28/2022    BUN 20 12/28/2022    CREATININE 0 84 12/28/2022    GLUC 73 12/23/2022    CALCIUM 10 0 12/28/2022

## 2023-01-24 NOTE — TELEPHONE ENCOUNTER
Yes this is correct  Here is the A/P from our last note:  "1  Temporal arteritis ([facility] [address] )  Assessment & Plan:  Patient continues on prednisone taper at this point she is on her final month of 10 mg daily and should be finished after that  Patient states that she still can only see partial vision out of her left eye seeing from bottom up horizontally nursing home  She states that sometimes by the end of the day this does go away and she can see normally however  Continue to follow-up with ophthalmology  "

## 2023-01-24 NOTE — TELEPHONE ENCOUNTER
This topic was discussed at pts last apt and is in her visit summary  PT should stop the prednisone after she is finished with her month of 10 mg's  SO if she is out she is done and no longer needs it

## 2023-02-05 DIAGNOSIS — R00.0 SINUS TACHYCARDIA: ICD-10-CM

## 2023-02-06 DIAGNOSIS — Z01.818 PREOP TESTING: ICD-10-CM

## 2023-02-06 DIAGNOSIS — I10 ESSENTIAL HYPERTENSION: ICD-10-CM

## 2023-02-06 DIAGNOSIS — Z01.818 PREOP GENERAL PHYSICAL EXAM: ICD-10-CM

## 2023-02-06 RX ORDER — METOPROLOL TARTRATE 50 MG/1
TABLET, FILM COATED ORAL
Qty: 180 TABLET | Refills: 1 | Status: SHIPPED | OUTPATIENT
Start: 2023-02-06

## 2023-02-06 RX ORDER — HYDROCHLOROTHIAZIDE 25 MG/1
25 TABLET ORAL DAILY
Qty: 90 TABLET | Refills: 4 | Status: SHIPPED | OUTPATIENT
Start: 2023-02-06

## 2023-03-13 ENCOUNTER — APPOINTMENT (OUTPATIENT)
Dept: LAB | Facility: CLINIC | Age: 83
End: 2023-03-13

## 2023-03-13 DIAGNOSIS — E83.52 HYPERCALCEMIA: ICD-10-CM

## 2023-03-13 LAB
ALBUMIN SERPL BCP-MCNC: 3.4 G/DL (ref 3.5–5)
ALP SERPL-CCNC: 48 U/L (ref 46–116)
ALT SERPL W P-5'-P-CCNC: 14 U/L (ref 12–78)
ANION GAP SERPL CALCULATED.3IONS-SCNC: 7 MMOL/L (ref 4–13)
AST SERPL W P-5'-P-CCNC: 14 U/L (ref 5–45)
BILIRUB SERPL-MCNC: 0.48 MG/DL (ref 0.2–1)
BUN SERPL-MCNC: 14 MG/DL (ref 5–25)
CALCIUM ALBUM COR SERPL-MCNC: 11 MG/DL (ref 8.3–10.1)
CALCIUM SERPL-MCNC: 10.5 MG/DL (ref 8.3–10.1)
CHLORIDE SERPL-SCNC: 103 MMOL/L (ref 96–108)
CO2 SERPL-SCNC: 27 MMOL/L (ref 21–32)
CREAT SERPL-MCNC: 0.79 MG/DL (ref 0.6–1.3)
GFR SERPL CREATININE-BSD FRML MDRD: 70 ML/MIN/1.73SQ M
GLUCOSE P FAST SERPL-MCNC: 133 MG/DL (ref 65–99)
POTASSIUM SERPL-SCNC: 3.4 MMOL/L (ref 3.5–5.3)
PROT SERPL-MCNC: 6.8 G/DL (ref 6.4–8.4)
SODIUM SERPL-SCNC: 137 MMOL/L (ref 135–147)

## 2023-03-27 ENCOUNTER — OFFICE VISIT (OUTPATIENT)
Dept: FAMILY MEDICINE CLINIC | Facility: CLINIC | Age: 83
End: 2023-03-27

## 2023-03-27 VITALS
SYSTOLIC BLOOD PRESSURE: 128 MMHG | WEIGHT: 157.4 LBS | DIASTOLIC BLOOD PRESSURE: 48 MMHG | HEART RATE: 64 BPM | BODY MASS INDEX: 28.97 KG/M2 | HEIGHT: 62 IN

## 2023-03-27 DIAGNOSIS — I10 ESSENTIAL HYPERTENSION: Primary | ICD-10-CM

## 2023-03-27 DIAGNOSIS — R63.4 WEIGHT LOSS: ICD-10-CM

## 2023-03-27 DIAGNOSIS — K51.40 PSEUDOPOLYPOSIS OF COLON WITHOUT COMPLICATION, UNSPECIFIED PART OF COLON (HCC): ICD-10-CM

## 2023-03-27 DIAGNOSIS — E11.311 TYPE 2 DIABETES MELLITUS WITH LEFT EYE AFFECTED BY RETINOPATHY AND MACULAR EDEMA, WITHOUT LONG-TERM CURRENT USE OF INSULIN, UNSPECIFIED RETINOPATHY SEVERITY (HCC): ICD-10-CM

## 2023-03-27 DIAGNOSIS — E87.6 HYPOKALEMIA: ICD-10-CM

## 2023-03-27 DIAGNOSIS — E78.2 MIXED HYPERLIPIDEMIA: ICD-10-CM

## 2023-03-27 DIAGNOSIS — I26.99 PULMONARY EMBOLISM (HCC): ICD-10-CM

## 2023-03-27 DIAGNOSIS — M31.6 TEMPORAL ARTERITIS (HCC): ICD-10-CM

## 2023-03-27 DIAGNOSIS — I26.99 OTHER ACUTE PULMONARY EMBOLISM WITHOUT ACUTE COR PULMONALE (HCC): ICD-10-CM

## 2023-03-27 DIAGNOSIS — R00.0 SINUS TACHYCARDIA: ICD-10-CM

## 2023-03-27 NOTE — PATIENT INSTRUCTIONS
1  Essential hypertension  Assessment & Plan:  Currently stable on medications  2  Pseudopolyposis of colon without complication, unspecified part of colon [facility])  Assessment & Plan:  Patient was told she no longer needs colonoscopies at her age  3  Temporal arteritis ([facility] [address] )  Assessment & Plan:  Patient is off her prednisone taper at this time  Continues to follow-up with ophthalmology  4  Weight loss  Assessment & Plan:  Patient has regained 1 pound since her last visit  Able at this time  5  Sinus tachycardia  Assessment & Plan:  Currently stable at 65 after increasing metoprolol  6  Other acute pulmonary embolism without acute cor pulmonale (HCC)  Assessment & Plan:  Patient was hospitalized and started on Eliquis roughly 2 to 3 months ago  She did see pulmonary as follow-up and they plan to keep her on Eliquis for total of 6 months  Diagnosis arose from patient's sedentary lifestyle so she was told to take frequent walks and be more active and hopefully in the near future as a follow-up with pulmonology they will decrease her Eliquis as planned  7  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, without long-term current use of insulin, unspecified retinopathy severity ([facility] [address] )  Assessment & Plan:  Blood work due in July  Stable off medication at this time  Lab Results   Component Value Date    HGBA1C 6 9 (H) 12/28/2022         8  Mixed hyperlipidemia  Assessment & Plan:  Continue Pravachol blood work due in July 9  Hypokalemia  Assessment & Plan:  Potassium only one tenth a point low  Increase potassium in her diet  10  Pulmonary embolism (HCC)  -     apixaban (Eliquis) 5 mg;  Take 1 tablet (5 mg total) by mouth 2 (two) times a day

## 2023-03-27 NOTE — PROGRESS NOTES
Name: Mary Carmen Moe      : 1940      MRN: [id number]  Encounter Provider: Ander Valente PA-C  Encounter Date: 3/27/2023   Encounter department: [facility]    Assessment & Plan     1  Essential hypertension  Assessment & Plan:  Currently stable on medications  2  Pseudopolyposis of colon without complication, unspecified part of colon [facility])  Assessment & Plan:  Patient was told she no longer needs colonoscopies at her age  3  Temporal arteritis ([facility] [address] )  Assessment & Plan:  Patient is off her prednisone taper at this time  Continues to follow-up with ophthalmology  4  Weight loss  Assessment & Plan:  Patient has regained 1 pound since her last visit  Able at this time  5  Sinus tachycardia  Assessment & Plan:  Currently stable at 64 after increasing metoprolol  6  Other acute pulmonary embolism without acute cor pulmonale (HCC)  Assessment & Plan:  Patient was hospitalized and started on Eliquis roughly 2 to 3 months ago  She did see pulmonary as follow-up and they plan to keep her on Eliquis for total of 6 months  Diagnosis arose from patient's sedentary lifestyle so she was told to take frequent walks and be more active and hopefully in the near future as a follow-up with pulmonology they will decrease her Eliquis as planned  7  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, without long-term current use of insulin, unspecified retinopathy severity ([facility] [address] )  Assessment & Plan:  Blood work due in July  Stable off medication at this time  Lab Results   Component Value Date    HGBA1C 6 9 (H) 2022         8  Mixed hyperlipidemia  Assessment & Plan:  Continue Pravachol blood work due in July  9  Hypokalemia  Assessment & Plan:  Potassium only one tenth a point low  Increase potassium in her diet  10  Pulmonary embolism (HCC)  -     apixaban (Eliquis) 5 mg;  Take 1 tablet (5 mg total) by mouth 2 (two) times a day           Subjective        Danisha Brown is here for chronic conditions f/u  Pt  had labs done prior to today's visit which included Recent Results (from the past 672 hour(s))  -Comprehensive metabolic panel:   Collection Time: 03/13/23  8:22 AM       Result                      Value             Ref Range           Sodium                      137               135 - 147 mm*       Potassium                   3 4 (L)           3 5 - 5 3 mm*       Chloride                    103               96 - 108 mmo*       CO2                         27                21 - 32 mmol*       ANION GAP                   7                 4 - 13 mmol/L       BUN                         14                5 - 25 mg/dL        Creatinine                  0 79              0 60 - 1 30 *       Glucose, Fasting            133 (H)           65 - 99 mg/dL       Calcium                     10 5 (H)          8 3 - 10 1 m*       Corrected Calcium           11 0 (H)          8 3 - 10 1 m*       AST                         14                5 - 45 U/L          ALT                         14                12 - 78 U/L         Alkaline Phosphatase        48                46 - 116 U/L        Total Protein               6 8               6 4 - 8 4 g/*       Albumin                     3 4 (L)           3 5 - 5 0 g/*       Total Bilirubin             0 48              0 20 - 1 00 *       eGFR                        70                ml/min/1 73s*    Patient here today for a 2-month check on her recent hospitalization for PE  She did follow-up with pulmonology they suspect that she developed a PE secondary to her sedentary lifestyle so she has been walking 5 to 6 days a week back to doing almost 99% of everything she did previous to the hospitalization including cooking laundry chores    Patient states that her stamina is much better she is eating normally she is taking her medications as directed her  would like a 90-day for the Eliquis as "it is $50 a month  Patient states she wears compression stockings having no problems with swelling or shortness of breath with exertion  No problems with dizziness or low blood pressure with the increase of metoprolol from the last visit from 25 twice daily to 50 twice daily  Patient does have further blood work with me and a follow-up in July for chronic conditions  Review of Systems   Constitutional: Negative  HENT: Negative  Eyes: Negative  Respiratory: Negative  Cardiovascular: Negative  Gastrointestinal: Negative  Endocrine: Negative  Genitourinary: Negative  Musculoskeletal: Negative  Skin: Negative  Allergic/Immunologic: Negative  Neurological: Negative  Hematological: Negative  Psychiatric/Behavioral: Negative  Current Outpatient Medications on File Prior to Visit   Medication Sig   • ascorbic acid (VITAMIN C) 250 mg tablet Take 250 mg by mouth daily   • Biotin 55698 MCG TABS Take by mouth   • Cholecalciferol (VITAMIN D) 2000 units CAPS Take 1 capsule by mouth 2 (two) times a day   • hydrochlorothiazide (HYDRODIURIL) 25 mg tablet TAKE 1 TABLET (25 MG TOTAL) BY MOUTH DAILY  • latanoprost (XALATAN) 0 005 % ophthalmic solution    • metoprolol tartrate (LOPRESSOR) 50 mg tablet TAKE 1 TABLET BY MOUTH EVERY 12 HOURS  • minoxidil (ROGAINE) 2 % external solution Apply topically daily   • pravastatin (PRAVACHOL) 10 mg tablet TAKE 1 TABLET BY MOUTH EVERYDAY AT BEDTIME   • [DISCONTINUED] apixaban (Eliquis) 5 mg Take 1 tablet (5 mg total) by mouth 2 (two) times a day   • ALPRAZolam (XANAX) 0 25 mg tablet Take 1 tablet (0 25 mg total) by mouth daily as needed for anxiety (Patient not taking: Reported on 3/27/2023)       Objective     BP (!) 128/48   Pulse 64   Ht 5' 2\" (1 575 m)   Wt 71 4 kg (157 lb 6 4 oz)   BMI 28 79 kg/m²     Physical Exam  Vitals and nursing note reviewed  Constitutional:       General: She is not in acute distress       Appearance: She is " well-developed  She is not diaphoretic  HENT:      Head: Normocephalic and atraumatic  Eyes:      General:         Right eye: No discharge  Left eye: No discharge  Conjunctiva/sclera: Conjunctivae normal    Neck:      Vascular: No carotid bruit  Cardiovascular:      Rate and Rhythm: Normal rate and regular rhythm  Heart sounds: Normal heart sounds  No murmur heard  No friction rub  No gallop  Pulmonary:      Effort: Pulmonary effort is normal  No respiratory distress  Breath sounds: Normal breath sounds  No wheezing or rales  Musculoskeletal:      Cervical back: Neck supple  Skin:     General: Skin is warm and dry  Neurological:      Mental Status: She is alert and oriented to person, place, and time     Psychiatric:         Judgment: Judgment normal        Toni Santos PA-C

## 2023-03-27 NOTE — ASSESSMENT & PLAN NOTE
Blood work due in July  Stable off medication at this time    Lab Results   Component Value Date    HGBA1C 6 9 (H) 12/28/2022

## 2023-03-27 NOTE — ASSESSMENT & PLAN NOTE
Patient was hospitalized and started on Eliquis roughly 2 to 3 months ago  She did see pulmonary as follow-up and they plan to keep her on Eliquis for total of 6 months  Diagnosis arose from patient's sedentary lifestyle so she was told to take frequent walks and be more active and hopefully in the near future as a follow-up with pulmonology they will decrease her Eliquis as planned

## 2023-03-30 ENCOUNTER — TELEPHONE (OUTPATIENT)
Dept: ADMINISTRATIVE | Facility: OTHER | Age: 83
End: 2023-03-30

## 2023-03-30 NOTE — TELEPHONE ENCOUNTER
Upon review of the In Basket request we were able to locate, review, and update the patient chart as requested for Diabetic Eye Exam     Any additional questions or concerns should be emailed to the Practice Liaisons via the appropriate education email address, please do not reply via In Basket      Thank you  Orvilla Mcardle

## 2023-03-30 NOTE — TELEPHONE ENCOUNTER
Upon review of the In Basket request we were able to locate, review, and update the patient chart as requested for Diabetic Foot Exam     Any additional questions or concerns should be emailed to the Practice Liaisons via the appropriate education email address, please do not reply via In Basket      Thank you  Artur Peres

## 2023-03-30 NOTE — TELEPHONE ENCOUNTER
----- Message from Saman Torres sent at 3/30/2023  8:14 AM EDT -----  Regarding: care gap request  03/30/23 8:14 AM    Hello, our patient attached above has had Diabetic Eye Exam completed/performed  Please assist in updating the patient chart by pulling the document from the Media Tab  The date of service is 12/9/2022       Thank you,  Shanita Garcia  [facility] PRIMARY [facility]

## 2023-04-06 ENCOUNTER — TELEPHONE (OUTPATIENT)
Dept: PULMONOLOGY | Facility: CLINIC | Age: 83
End: 2023-04-06

## 2023-04-06 DIAGNOSIS — M31.6 TEMPORAL ARTERITIS (HCC): ICD-10-CM

## 2023-04-06 RX ORDER — PREDNISONE 10 MG/1
TABLET ORAL
Qty: 126 TABLET | Refills: 1 | Status: SHIPPED | OUTPATIENT
Start: 2023-04-06 | End: 2023-06-29

## 2023-04-06 NOTE — TELEPHONE ENCOUNTER
Left a voicemail for patient to schedule a 5 month follow up at our [facility] office with Dr Naomi Siddiqui or GAY in [place]

## 2023-05-30 ENCOUNTER — OFFICE VISIT (OUTPATIENT)
Dept: FAMILY MEDICINE CLINIC | Facility: CLINIC | Age: 83
End: 2023-05-30

## 2023-05-30 ENCOUNTER — RA CDI HCC (OUTPATIENT)
Dept: OTHER | Facility: HOSPITAL | Age: 83
End: 2023-05-30

## 2023-05-30 VITALS
BODY MASS INDEX: 30 KG/M2 | HEIGHT: 62 IN | TEMPERATURE: 97.8 F | SYSTOLIC BLOOD PRESSURE: 120 MMHG | DIASTOLIC BLOOD PRESSURE: 70 MMHG | HEART RATE: 76 BPM | WEIGHT: 163 LBS

## 2023-05-30 DIAGNOSIS — L30.9 DERMATITIS: Primary | ICD-10-CM

## 2023-05-30 DIAGNOSIS — I10 ESSENTIAL HYPERTENSION: ICD-10-CM

## 2023-05-30 NOTE — PATIENT INSTRUCTIONS
1  Dermatitis  Assessment & Plan: It is not clear what the actual cause is of this dermatitis  It could fall into several different categories  At this time trial of Mycolog-II cream  Instructed patient to apply cream to the area of the rash 2 times daily  Patient should use the cream for 2 weeks  If the rash gets better, but is still noticeable after 2 weeks, she should continue to use the cream for 1 more week  She should make another follow-up appointment if the rash does not get any better, or gets worse, after 2 weeks with the use of the cream   If no improvement at all will consider referral to [facility]      Orders:  -     nystatin-triamcinolone (MYCOLOG-II) cream; Apply topically 2 (two) times a day

## 2023-05-30 NOTE — ASSESSMENT & PLAN NOTE
It is not clear what the actual cause is of this dermatitis  It could fall into several different categories  At this time trial of Mycolog-II cream  Instructed patient to apply cream to the area of the rash 2 times daily  Patient should use the cream for 2 weeks  If the rash gets better, but is still noticeable after 2 weeks, she should continue to use the cream for 1 more week  She should make another follow-up appointment if the rash does not get any better, or gets worse, after 2 weeks with the use of the cream   If no improvement at all will consider referral to [facility]

## 2023-05-30 NOTE — PROGRESS NOTES
Name: Haseeb Valera      : 1940      MRN: [id number]  Encounter Provider: Lamberto Oconnor PA-C  Encounter Date: 2023   Encounter department: [facility] PRIMARY CARE    Assessment & Plan     1  Dermatitis  Assessment & Plan: It is not clear what the actual cause is of this dermatitis  It could fall into several different categories  At this time trial of Mycolog-II cream  Instructed patient to apply cream to the area of the rash 2 times daily  Patient should use the cream for 2 weeks  If the rash gets better, but is still noticeable after 2 weeks, she should continue to use the cream for 1 more week  She should make another follow-up appointment if the rash does not get any better, or gets worse, after 2 weeks with the use of the cream   If no improvement at all will consider referral to [facility]  Orders:  -     nystatin-triamcinolone (MYCOLOG-II) cream; Apply topically 2 (two) times a day         Subjective      HPI  Review of Systems    Current Outpatient Medications on File Prior to Visit   Medication Sig   • ALPRAZolam (XANAX) 0 25 mg tablet Take 1 tablet (0 25 mg total) by mouth daily as needed for anxiety   • apixaban (Eliquis) 5 mg Take 1 tablet (5 mg total) by mouth 2 (two) times a day   • ascorbic acid (VITAMIN C) 250 mg tablet Take 250 mg by mouth daily   • Cholecalciferol (VITAMIN D) 2000 units CAPS Take 1 capsule by mouth 2 (two) times a day   • hydrochlorothiazide (HYDRODIURIL) 25 mg tablet TAKE 1 TABLET (25 MG TOTAL) BY MOUTH DAILY  • latanoprost (XALATAN) 0 005 % ophthalmic solution    • metoprolol tartrate (LOPRESSOR) 50 mg tablet TAKE 1 TABLET BY MOUTH EVERY 12 HOURS     • pravastatin (PRAVACHOL) 10 mg tablet TAKE 1 TABLET BY MOUTH EVERYDAY AT BEDTIME   • [DISCONTINUED] Biotin 66283 MCG TABS Take by mouth   • [DISCONTINUED] minoxidil (ROGAINE) 2 % external solution Apply topically daily   • [DISCONTINUED] predniSONE 10 mg tablet TAKE 5 TABLETS (50 MG TOTAL) BY MOUTH DAILY FOR "14 DAYS, THEN 4 TABLETS (40 MG TOTAL) DAILY FOR 14 DAYS, THEN 3 TABLETS (30 MG TOTAL) DAILY FOR 14 DAYS, THEN 2 TABLETS (20 MG TOTAL) DAILY FOR 14 DAYS, THEN 1 TABLET (10 MG TOTAL) DAILY FOR 28 DAYS         Objective     /70   Pulse 76   Temp 97 8 °F (36 6 °C)   Ht 5' 2\" (1 575 m)   Wt 73 9 kg (163 lb)   BMI 29 81 kg/m²     Physical Exam  Sonya Schilling PA-C  "

## 2023-05-30 NOTE — PROGRESS NOTES
[address]  coding opportunities     E11 [id number]     Chart Reviewed number of suggestions sent to Provider: 1     Patients Insurance     Medicare Insurance: [address] [place] [address]

## 2023-05-30 NOTE — PROGRESS NOTES
Name: Katie Harrison      : 1940      MRN: [id number]  Encounter Provider: Junior Loreta PA-C  Encounter Date: 2023   Encounter department: [facility] PRIMARY CARE    Assessment & Plan     1  Dermatitis  Assessment & Plan: It is not clear what the actual cause is of this dermatitis  It could fall into several different categories  At this time trial of Mycolog-II cream  Instructed patient to apply cream to the area of the rash 2 times daily  Patient should use the cream for 2 weeks  If the rash gets better, but is still noticeable after 2 weeks, she should continue to use the cream for 1 more week  She should make another follow-up appointment if the rash does not get any better, or gets worse, after 2 weeks with the use of the cream   If no improvement at all will consider referral to [facility]  Orders:  -     nystatin-triamcinolone (MYCOLOG-II) cream; Apply topically 2 (two) times a day    2  Essential hypertension  Assessment & Plan:  BP stable today  Booker Baker is an 82 y/o female presenting with complaints of a facial rash x 2 weeks  She states that 2 weeks ago, she noted an area of erythema between her bottom lip and chin  The rash spread to involve the area between the bottom of her nose and her upper lip  The rash has not spread to anywhere else on the patient's face or anywhere on her body  She states that at the beginning, the rash was slightly itchy  Denies itching, pain, or blisters with the rash at present  She noted dry skin and peeling skin in the area of the rash  She has tried Neosporin ointment, Vaseline ointment, and facial moisturizer without relief  She has never had anything like this before  Denies history of eczema or dermatitis  She denies use of new medications, moisturizers, laundry detergents, masks, or other products in her daily routines   She does note that she walks outside every day with a hat on and was at first concerned that the rash "was sunburn  However, the rash did not get better since it was first noted 2 weeks ago  Rash      Review of Systems   Constitutional: Negative  HENT: Negative  Eyes: Negative  Respiratory: Negative  Cardiovascular: Negative  Gastrointestinal: Negative  Endocrine: Negative  Genitourinary: Negative  Musculoskeletal: Negative  Skin: Positive for color change (Erythema) and rash (Facial rash)  Allergic/Immunologic: Negative  Neurological: Negative  Hematological: Negative  All other systems reviewed and are negative  Current Outpatient Medications on File Prior to Visit   Medication Sig   • ALPRAZolam (XANAX) 0 25 mg tablet Take 1 tablet (0 25 mg total) by mouth daily as needed for anxiety   • apixaban (Eliquis) 5 mg Take 1 tablet (5 mg total) by mouth 2 (two) times a day   • ascorbic acid (VITAMIN C) 250 mg tablet Take 250 mg by mouth daily   • Cholecalciferol (VITAMIN D) 2000 units CAPS Take 1 capsule by mouth 2 (two) times a day   • hydrochlorothiazide (HYDRODIURIL) 25 mg tablet TAKE 1 TABLET (25 MG TOTAL) BY MOUTH DAILY  • latanoprost (XALATAN) 0 005 % ophthalmic solution    • metoprolol tartrate (LOPRESSOR) 50 mg tablet TAKE 1 TABLET BY MOUTH EVERY 12 HOURS  • pravastatin (PRAVACHOL) 10 mg tablet TAKE 1 TABLET BY MOUTH EVERYDAY AT BEDTIME   • [DISCONTINUED] Biotin 61669 MCG TABS Take by mouth   • [DISCONTINUED] minoxidil (ROGAINE) 2 % external solution Apply topically daily   • [DISCONTINUED] predniSONE 10 mg tablet TAKE 5 TABLETS (50 MG TOTAL) BY MOUTH DAILY FOR 14 DAYS, THEN 4 TABLETS (40 MG TOTAL) DAILY FOR 14 DAYS, THEN 3 TABLETS (30 MG TOTAL) DAILY FOR 14 DAYS, THEN 2 TABLETS (20 MG TOTAL) DAILY FOR 14 DAYS, THEN 1 TABLET (10 MG TOTAL) DAILY FOR 28 DAYS  Objective     /70   Pulse 76   Temp 97 8 °F (36 6 °C)   Ht 5' 2\" (1 575 m)   Wt 73 9 kg (163 lb)   BMI 29 81 kg/m²     Physical Exam  Vitals and nursing note reviewed     Constitutional:       " General: She is not in acute distress  Appearance: Normal appearance  She is not ill-appearing  HENT:      Head: Normocephalic and atraumatic  Mouth/Throat:      Lips: Pink  Mouth: Mucous membranes are moist  No oral lesions  Comments: No swelling of the lips or tongue  Rash does not extend to involve the lips or mouth  Eyes:      Conjunctiva/sclera: Conjunctivae normal    Pulmonary:      Effort: Pulmonary effort is normal    Skin:     General: Skin is warm and dry  Findings: Erythema and rash present  Rash is macular and scaling  Rash is not crusting, nodular, papular, purpuric, pustular, urticarial or vesicular  Comments: Macular, erythematous rash noted between the bottom of the nose extending to the upper lip and continuing from the bottom lip to the bottom of the chin  Rash is sharply demarcated and not raised  Scaling noted in the area between the upper lip and nose  No vesicles, blistering, or scaling of the rash  Rash is not noted in any other area of the face         Luciano Griffin PA-C

## 2023-07-11 ENCOUNTER — APPOINTMENT (OUTPATIENT)
Dept: LAB | Facility: CLINIC | Age: 83
End: 2023-07-11
Payer: COMMERCIAL

## 2023-07-11 DIAGNOSIS — M31.6 TEMPORAL ARTERITIS (HCC): ICD-10-CM

## 2023-07-11 DIAGNOSIS — E11.311 TYPE 2 DIABETES MELLITUS WITH LEFT EYE AFFECTED BY RETINOPATHY AND MACULAR EDEMA, WITHOUT LONG-TERM CURRENT USE OF INSULIN, UNSPECIFIED RETINOPATHY SEVERITY (HCC): ICD-10-CM

## 2023-07-11 DIAGNOSIS — I26.99 OTHER ACUTE PULMONARY EMBOLISM WITHOUT ACUTE COR PULMONALE (HCC): ICD-10-CM

## 2023-07-11 DIAGNOSIS — E78.2 MIXED HYPERLIPIDEMIA: ICD-10-CM

## 2023-07-11 LAB
ALBUMIN SERPL BCP-MCNC: 3.6 G/DL (ref 3.5–5)
ALP SERPL-CCNC: 57 U/L (ref 46–116)
ALT SERPL W P-5'-P-CCNC: 19 U/L (ref 12–78)
ANION GAP SERPL CALCULATED.3IONS-SCNC: 1 MMOL/L
AST SERPL W P-5'-P-CCNC: 18 U/L (ref 5–45)
BILIRUB SERPL-MCNC: 0.64 MG/DL (ref 0.2–1)
BUN SERPL-MCNC: 15 MG/DL (ref 5–25)
CALCIUM SERPL-MCNC: 10.2 MG/DL (ref 8.3–10.1)
CHLORIDE SERPL-SCNC: 107 MMOL/L (ref 96–108)
CHOLEST SERPL-MCNC: 162 MG/DL
CO2 SERPL-SCNC: 29 MMOL/L (ref 21–32)
CREAT SERPL-MCNC: 0.81 MG/DL (ref 0.6–1.3)
CREAT UR-MCNC: 77.4 MG/DL
GFR SERPL CREATININE-BSD FRML MDRD: 67 ML/MIN/1.73SQ M
GLUCOSE P FAST SERPL-MCNC: 107 MG/DL (ref 65–99)
HDLC SERPL-MCNC: 57 MG/DL
LDLC SERPL CALC-MCNC: 80 MG/DL (ref 0–100)
MICROALBUMIN UR-MCNC: 5.2 MG/L (ref 0–20)
MICROALBUMIN/CREAT 24H UR: 7 MG/G CREATININE (ref 0–30)
POTASSIUM SERPL-SCNC: 4.1 MMOL/L (ref 3.5–5.3)
PROT SERPL-MCNC: 6.6 G/DL (ref 6.4–8.4)
SODIUM SERPL-SCNC: 137 MMOL/L (ref 135–147)
TRIGL SERPL-MCNC: 123 MG/DL
TSH SERPL DL<=0.05 MIU/L-ACNC: 1.25 UIU/ML (ref 0.45–4.5)

## 2023-07-11 PROCEDURE — 36415 COLL VENOUS BLD VENIPUNCTURE: CPT

## 2023-07-11 PROCEDURE — 82043 UR ALBUMIN QUANTITATIVE: CPT

## 2023-07-11 PROCEDURE — 82570 ASSAY OF URINE CREATININE: CPT

## 2023-07-11 PROCEDURE — 80053 COMPREHEN METABOLIC PANEL: CPT

## 2023-07-11 PROCEDURE — 83036 HEMOGLOBIN GLYCOSYLATED A1C: CPT

## 2023-07-11 PROCEDURE — 84443 ASSAY THYROID STIM HORMONE: CPT

## 2023-07-11 PROCEDURE — 80061 LIPID PANEL: CPT

## 2023-07-12 LAB
EST. AVERAGE GLUCOSE BLD GHB EST-MCNC: 108 MG/DL
HBA1C MFR BLD: 5.4 %

## 2023-07-16 DIAGNOSIS — E78.2 MIXED HYPERLIPIDEMIA: ICD-10-CM

## 2023-07-16 DIAGNOSIS — R00.0 SINUS TACHYCARDIA: ICD-10-CM

## 2023-07-17 RX ORDER — PRAVASTATIN SODIUM 10 MG
TABLET ORAL
Qty: 90 TABLET | Refills: 1 | Status: SHIPPED | OUTPATIENT
Start: 2023-07-17

## 2023-07-17 RX ORDER — METOPROLOL TARTRATE 50 MG/1
TABLET, FILM COATED ORAL
Qty: 180 TABLET | Refills: 1 | Status: SHIPPED | OUTPATIENT
Start: 2023-07-17

## 2023-07-20 DIAGNOSIS — I26.99 PULMONARY EMBOLISM (HCC): ICD-10-CM

## 2023-07-20 RX ORDER — APIXABAN 5 MG/1
5 TABLET, FILM COATED ORAL 2 TIMES DAILY
Qty: 180 TABLET | Refills: 1 | Status: SHIPPED | OUTPATIENT
Start: 2023-07-20

## 2023-07-21 ENCOUNTER — OFFICE VISIT (OUTPATIENT)
Dept: FAMILY MEDICINE CLINIC | Facility: CLINIC | Age: 83
End: 2023-07-21
Payer: COMMERCIAL

## 2023-07-21 VITALS
HEIGHT: 62 IN | BODY MASS INDEX: 30.36 KG/M2 | OXYGEN SATURATION: 98 % | DIASTOLIC BLOOD PRESSURE: 60 MMHG | SYSTOLIC BLOOD PRESSURE: 138 MMHG | HEART RATE: 63 BPM | WEIGHT: 165 LBS

## 2023-07-21 DIAGNOSIS — E78.2 MIXED HYPERLIPIDEMIA: ICD-10-CM

## 2023-07-21 DIAGNOSIS — E11.311 TYPE 2 DIABETES MELLITUS WITH LEFT EYE AFFECTED BY RETINOPATHY AND MACULAR EDEMA, WITHOUT LONG-TERM CURRENT USE OF INSULIN, UNSPECIFIED RETINOPATHY SEVERITY (HCC): ICD-10-CM

## 2023-07-21 DIAGNOSIS — Z12.31 ENCOUNTER FOR SCREENING MAMMOGRAM FOR MALIGNANT NEOPLASM OF BREAST: ICD-10-CM

## 2023-07-21 DIAGNOSIS — E55.9 VITAMIN D DEFICIENCY: ICD-10-CM

## 2023-07-21 DIAGNOSIS — M85.80 OSTEOPENIA, UNSPECIFIED LOCATION: ICD-10-CM

## 2023-07-21 DIAGNOSIS — I10 ESSENTIAL HYPERTENSION: Primary | ICD-10-CM

## 2023-07-21 PROCEDURE — 99214 OFFICE O/P EST MOD 30 MIN: CPT | Performed by: PHYSICIAN ASSISTANT

## 2023-07-21 PROCEDURE — G0439 PPPS, SUBSEQ VISIT: HCPCS | Performed by: PHYSICIAN ASSISTANT

## 2023-07-21 NOTE — PROGRESS NOTES
Assessment and Plan:     Problem List Items Addressed This Visit        Endocrine    Type 2 diabetes mellitus with ophthalmic manifestations ([address])     Very well controlled with an A1c of 5.4 and a fasting sugar of 107 without the use of medication at this time. Continue to observe and recheck in 6 months. Lab Results   Component Value Date    HGBA1C 5.4 07/11/2023            Relevant Orders    Albumin / creatinine urine ratio    Comprehensive metabolic panel    Hemoglobin A1C    CBC and Platelet       Cardiovascular and Mediastinum    Essential hypertension - Primary     Stable on current meds. Musculoskeletal and Integument    Osteopenia     DEXA not due until 2024. Other    Mixed hyperlipidemia     Patient is on Pravachol 10 keeping her LDL at goal at 80 continue recheck 6 months. Relevant Orders    Lipid Panel with Direct LDL reflex    Vitamin D deficiency     Check vitamin D with next labs. Relevant Orders    Vitamin D 25 hydroxy   Other Visit Diagnoses     Encounter for screening mammogram for malignant neoplasm of breast              Depression Screening and Follow-up Plan: Patient was screened for depression during today's encounter. They screened negative with a PHQ-2 score of 0. Urinary Incontinence Plan of Care: counseling topics discussed: practice Kegel (pelvic floor strengthening) exercises, limit alcohol, caffeine, spicy foods, and acidic foods, limiting fluid intake 3-4 hours before bed and weight loss. Preventive health issues were discussed with patient, and age appropriate screening tests were ordered as noted in patient's After Visit Summary. Personalized health advice and appropriate referrals for health education or preventive services given if needed, as noted in patient's After Visit Summary. History of Present Illness:     Patient presents for a Medicare Wellness Visit      Markus Herrera is here for chronic conditions f/u.  Pt. had labs done prior to today's visit which included Recent Results (from the past 672 hour(s))  -Hemoglobin A1C:   Collection Time: 07/11/23  8:47 AM       Result                      Value             Ref Range           Hemoglobin A1C              5.4               Normal 3.8-5*       EAG                         108               mg/dl          -Comprehensive metabolic panel:   Collection Time: 07/11/23  8:47 AM       Result                      Value             Ref Range           Sodium                      137               135 - 147 mm*       Potassium                   4.1               3.5 - 5.3 mm*       Chloride                    107               96 - 108 mmo*       CO2                         29                21 - 32 mmol*       ANION GAP                   1                 mmol/L              BUN                         15                5 - 25 mg/dL        Creatinine                  0.81              0.60 - 1.30 *       Glucose, Fasting            107 (H)           65 - 99 mg/dL       Calcium                     10.2 (H)          8.3 - 10.1 m*       AST                         18                5 - 45 U/L          ALT                         19                12 - 78 U/L         Alkaline Phosphatase        57                46 - 116 U/L        Total Protein               6.6               6.4 - 8.4 g/*       Albumin                     3.6               3.5 - 5.0 g/*       Total Bilirubin             0.64              0.20 - 1.00 *       eGFR                        67                ml/min/1.73s*  -Lipid Panel with Direct LDL reflex:   Collection Time: 07/11/23  8:47 AM       Result                      Value             Ref Range           Cholesterol                 162               See Comment *       Triglycerides               123               See Comment *       HDL, Direct                 57                >=50 mg/dL          LDL Calculated              80                0 - 100 mg/dL  -TSH, 3rd generation with Free T4 reflex:   Collection Time: 07/11/23  8:47 AM       Result                      Value             Ref Range           TSH 3RD GENERATON           1.247             0.450 - 4.50*  -Microalbumin / creatinine urine ratio:   Collection Time: 07/11/23 10:38 AM       Result                      Value             Ref Range           Creatinine, Ur              77.4              mg/dL               Albumin,U,Random            5.2               0.0 - 20.0 m*       Albumin Creat Ratio         7                 0 - 30 mg/g *       Patient Care Team:  Diann Rocha PA-C as PCP - General (Family Medicine)  Tiago Kong MD (Inactive) (Gynecology)  Shana Pete DPM (Podiatry)  Tom Ndiaye MD (Ophthalmology)     Review of Systems:     Review of Systems   Constitutional: Negative. HENT: Negative. Eyes: Negative. Respiratory: Negative. Cardiovascular: Negative. Gastrointestinal: Negative. Endocrine: Negative. Genitourinary: Negative. Musculoskeletal: Negative. Skin: Negative. Allergic/Immunologic: Negative. Neurological: Negative. Hematological: Negative. Psychiatric/Behavioral: Negative.          Problem List:     Patient Active Problem List   Diagnosis   • Allergic rhinitis   • Alopecia   • Mixed hyperlipidemia   • Essential hypertension   • Osteopenia   • Soft tissue neoplasm   • Synovial cyst   • Vitamin D deficiency   • Chronic fatigue   • Primary osteoarthritis of right knee   • Atrophic vaginitis   • Murmur, cardiac   • Gross hematuria   • 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   • Type 2 diabetes mellitus with ophthalmic manifestations (HCC)   • Hypercalcemia   • Pseudopolyposis of colon without complication, unspecified part of colon (HCC)   • Temporal arteritis (HCC)   • Hammertoes of both feet   • Onychomycosis   • Constipation   • Anxiety   • Ambulatory dysfunction   • Hematuria   • Sinus tachycardia   • Hypokalemia   • Memory changes   • Acute pulmonary embolism without acute cor pulmonale (HCC)   • Weight loss   • Dermatitis      Past Medical and Surgical History:     Past Medical History:   Diagnosis Date   • Diabetes mellitus ([address])    • Hemorrhoids    • Hyperlipidemia    • Hypertension    • ovarian    • Pseudopolyposis of colon without complication, unspecified part of colon ([address]) 07/01/2022     Past Surgical History:   Procedure Laterality Date   • BREAST BIOPSY      Bx breast percutan needle core use image guide (stereotactic)   • CATARACT EXTRACTION     • COLONOSCOPY     • HEMORROIDECTOMY     • OH LIGATION/BIOPSY TEMPORAL ARTERY Bilateral 10/17/2022    Procedure: BIOPSY ARTERY TEMPORAL;  Surgeon: Homa Valdez DO;  Location: [facility];  Service: Vascular   • TOTAL ABDOMINAL HYSTERECTOMY        Family History:     Family History   Problem Relation Age of Onset   • Heart disease Mother    • Lung cancer Mother    • Uterine cancer Mother    • Heart disease Father    • Breast cancer Sister    • Diabetes Maternal Grandmother       Social History:     Social History     Socioeconomic History   • Marital status: /Civil Union     Spouse name: None   • Number of children: None   • Years of education: None   • Highest education level: None   Occupational History   • None   Tobacco Use   • Smoking status: Never   • Smokeless tobacco: Never   Vaping Use   • Vaping Use: None   Substance and Sexual Activity   • Alcohol use: Not Currently     Comment: rarely   • Drug use: Never   • Sexual activity: None   Other Topics Concern   • None   Social History Narrative        Retired     Social Determinants of Health     Financial Resource Strain: [address]  (7/21/2023)    Overall Financial Resource Strain (CARDIA)    • Difficulty of Paying Living Expenses: Not very hard   Food Insecurity: No Food Insecurity (12/21/2022)    Hunger Vital Sign    • Worried About Running Out of Food in the Last Year: Never true    • Ran Out of Food in the Last Year: Never true   Transportation Needs: No Transportation Needs (7/21/2023)    PRAPARE - Transportation    • Lack of Transportation (Medical): No    • Lack of Transportation (Non-Medical): No   Physical Activity: Not on file   Stress: Not on file   Social Connections: Not on file   Intimate Partner Violence: Not on file   Housing Stability: Low Risk  (12/21/2022)    Housing Stability Vital Sign    • Unable to Pay for Housing in the Last Year: No    • Number of Places Lived in the Last Year: 1    • Unstable Housing in the Last Year: No      Medications and Allergies:     Current Outpatient Medications   Medication Sig Dispense Refill   • ALPRAZolam (XANAX) 0.25 mg tablet Take 1 tablet (0.25 mg total) by mouth daily as needed for anxiety 10 tablet 0   • ascorbic acid (VITAMIN C) 250 mg tablet Take 250 mg by mouth daily     • Cholecalciferol (VITAMIN D) 2000 units CAPS Take 1 capsule by mouth 2 (two) times a day     • Eliquis 5 MG TAKE 1 TABLET BY MOUTH TWICE A  tablet 1   • hydrochlorothiazide (HYDRODIURIL) 25 mg tablet TAKE 1 TABLET (25 MG TOTAL) BY MOUTH DAILY. 90 tablet 4   • latanoprost (XALATAN) 0.005 % ophthalmic solution      • metoprolol tartrate (LOPRESSOR) 50 mg tablet TAKE 1 TABLET BY MOUTH EVERY 12 HOURS 180 tablet 1   • nystatin-triamcinolone (MYCOLOG-II) cream Apply topically 2 (two) times a day 30 g 0   • pravastatin (PRAVACHOL) 10 mg tablet TAKE 1 TABLET BY MOUTH EVERYDAY AT BEDTIME 90 tablet 1     No current facility-administered medications for this visit.      Allergies   Allergen Reactions   • Iodinated Contrast Media Hives      Immunizations:     Immunization History   Administered Date(s) Administered   • COVID-19 MODERNA VACC 0.5 ML IM 01/11/2021, 02/08/2021, 10/27/2021, 04/04/2022   • COVID-19 Moderna Vac BIVALENT 12 Yr+ IM (BOOSTER ONLY) 0.5 ML 10/03/2022   • INFLUENZA 09/28/2020   • Influenza Split High Dose Preservative Free IM 10/19/2015, 10/21/2016, 10/31/2017   • Influenza, high dose seasonal 0.7 mL 09/21/2018, 11/05/2019, 08/20/2021, 09/23/2022   • Influenza, seasonal, injectable 11/06/2010   • Pneumococcal Conjugate 13-Valent 04/05/2019   • Pneumococcal Polysaccharide PPV23 02/18/2011   • Zoster 07/21/2012   • Zoster Vaccine Recombinant 07/01/2020, 09/28/2020      Health Maintenance:         Topic Date Due   • Breast Cancer Screening: Mammogram  03/23/2023   • DXA SCAN  12/12/2027         Topic Date Due   • COVID-19 Vaccine (6 - Moderna series) 02/03/2023   • Influenza Vaccine (1) 09/01/2023      Medicare Screening Tests and Risk Assessments:     Katherine Fan is here for her Subsequent Wellness visit. Health Risk Assessment:   Patient rates overall health as excellent. Patient feels that their physical health rating is much better. Patient is very satisfied with their life. Eyesight was rated as same. Hearing was rated as same. Patient feels that their emotional and mental health rating is much better. Patients states they are never, rarely angry. Patient states they are never, rarely unusually tired/fatigued. Pain experienced in the last 7 days has been none. Patient states that she has experienced no weight loss or gain in last 6 months. Depression Screening:   PHQ-2 Score: 0      Fall Risk Screening: In the past year, patient has experienced: no history of falling in past year      Urinary Incontinence Screening:   Patient has leaked urine accidently in the last six months. A little bit    Home Safety:  Patient does not have trouble with stairs inside or outside of their home. Patient has working smoke alarms and has working carbon monoxide detector. Home safety hazards include: none. Nutrition:   Current diet is Regular. Medications:   Patient is currently taking over-the-counter supplements. OTC medications include: see medication list. Patient is able to manage medications.      Activities of Daily Living (ADLs)/Instrumental Activities of Daily Living (IADLs):   Walk and transfer into and out of bed and chair?: Yes  Dress and groom yourself?: Yes    Bathe or shower yourself?: Yes    Feed yourself? Yes  Do your laundry/housekeeping?: Yes  Manage your money, pay your bills and track your expenses?: Yes  Make your own meals?: Yes    Do your own shopping?: Yes    Previous Hospitalizations:   Any hospitalizations or ED visits within the last 12 months?: Yes    How many hospitalizations have you had in the last year?: 1-2    Advance Care Planning:   Living will: Yes    Durable POA for healthcare: Yes    Advanced directive: Yes    Advanced directive counseling given: Yes      Cognitive Screening:   Provider or family/friend/caregiver concerned regarding cognition?: No    PREVENTIVE SCREENINGS      Cardiovascular Screening:    General: Screening Not Indicated, History Lipid Disorder and Patient Declines      Diabetes Screening:     General: Screening Not Indicated and History Diabetes      Colorectal Cancer Screening:     General: Screening Not Indicated      Breast Cancer Screening:     General: Screening Current      Cervical Cancer Screening:    General: Screening Not Indicated      Osteoporosis Screening:    General: Screening Current      Abdominal Aortic Aneurysm (AAA) Screening:        General: Screening Not Indicated      Lung Cancer Screening:     General: Screening Not Indicated    Screening, Brief Intervention, and Referral to Treatment (SBIRT)    Screening  Typical number of drinks in a day: 0  Typical number of drinks in a week: 0  Interpretation: Low risk drinking behavior. Single Item Drug Screening:  How often have you used an illegal drug (including marijuana) or a prescription medication for non-medical reasons in the past year? never    Single Item Drug Screen Score: 0  Interpretation: Negative screen for possible drug use disorder    No results found.      Physical Exam:     /60   Pulse 63   Ht 5' 2" (1.575 m)   Wt 74.8 kg (165 lb)   SpO2 98%   BMI 30.18 kg/m²     Physical Exam  Vitals and nursing note reviewed. Constitutional:       Appearance: Normal appearance. She is well-developed. HENT:      Head: Normocephalic and atraumatic. Eyes:      General: Lids are normal.      Conjunctiva/sclera: Conjunctivae normal.      Pupils: Pupils are equal, round, and reactive to light. Cardiovascular:      Rate and Rhythm: Normal rate and regular rhythm. Heart sounds: No murmur heard. Pulmonary:      Effort: Pulmonary effort is normal.      Breath sounds: Normal breath sounds. Skin:     General: Skin is warm and dry. Neurological:      General: No focal deficit present. Mental Status: She is alert. Coordination: Coordination is intact. Psychiatric:         Mood and Affect: Mood normal.         Behavior: Behavior normal. Behavior is cooperative. Thought Content:  Thought content normal.         Judgment: Judgment normal.          Willi Weiss PA-C

## 2023-07-21 NOTE — PATIENT INSTRUCTIONS
1. Essential hypertension    2. Osteopenia, unspecified location    3. 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 Continue to observe and recheck in 6 months. Lab Results   Component Value Date    HGBA1C 5.4 07/11/2023         4. Vitamin D deficiency  Assessment & Plan:  Check vitamin D with next labs. 5. Mixed hyperlipidemia  Assessment & Plan:  Patient is on Pravachol 10 keeping her LDL at goal at 80 continue recheck 6 months.       6. Encounter for screening mammogram for malignant neoplasm of breast

## 2023-07-21 NOTE — ASSESSMENT & PLAN NOTE
Very well controlled with an A1c of 5.4 and a fasting sugar of 107 without the use of medication at this time. Continue to observe and recheck in 6 months.   Lab Results   Component Value Date    HGBA1C 5.4 07/11/2023

## 2023-11-07 DIAGNOSIS — E78.2 MIXED HYPERLIPIDEMIA: ICD-10-CM

## 2023-11-07 RX ORDER — PRAVASTATIN SODIUM 10 MG
TABLET ORAL
Qty: 90 TABLET | Refills: 1 | Status: SHIPPED | OUTPATIENT
Start: 2023-11-07

## 2023-12-05 DIAGNOSIS — R00.0 SINUS TACHYCARDIA: ICD-10-CM

## 2023-12-05 RX ORDER — METOPROLOL TARTRATE 50 MG/1
TABLET, FILM COATED ORAL
Qty: 180 TABLET | Refills: 1 | Status: SHIPPED | OUTPATIENT
Start: 2023-12-05

## 2023-12-18 ENCOUNTER — OFFICE VISIT (OUTPATIENT)
Dept: FAMILY MEDICINE CLINIC | Facility: CLINIC | Age: 83
End: 2023-12-18
Payer: COMMERCIAL

## 2023-12-18 VITALS
HEART RATE: 71 BPM | HEIGHT: 62 IN | WEIGHT: 174 LBS | TEMPERATURE: 97.7 F | SYSTOLIC BLOOD PRESSURE: 112 MMHG | DIASTOLIC BLOOD PRESSURE: 52 MMHG | BODY MASS INDEX: 32.02 KG/M2 | RESPIRATION RATE: 16 BRPM

## 2023-12-18 DIAGNOSIS — J01.00 ACUTE NON-RECURRENT MAXILLARY SINUSITIS: Primary | ICD-10-CM

## 2023-12-18 DIAGNOSIS — I26.99 OTHER ACUTE PULMONARY EMBOLISM WITHOUT ACUTE COR PULMONALE (HCC): ICD-10-CM

## 2023-12-18 DIAGNOSIS — E11.311 TYPE 2 DIABETES MELLITUS WITH LEFT EYE AFFECTED BY RETINOPATHY AND MACULAR EDEMA, WITHOUT LONG-TERM CURRENT USE OF INSULIN, UNSPECIFIED RETINOPATHY SEVERITY (HCC): ICD-10-CM

## 2023-12-18 DIAGNOSIS — I10 PRIMARY HYPERTENSION: ICD-10-CM

## 2023-12-18 DIAGNOSIS — L30.9 DERMATITIS: ICD-10-CM

## 2023-12-18 PROCEDURE — 99214 OFFICE O/P EST MOD 30 MIN: CPT | Performed by: FAMILY MEDICINE

## 2023-12-18 RX ORDER — NYSTATIN 100000 U/G
CREAM TOPICAL 2 TIMES DAILY
Qty: 30 G | Refills: 2 | Status: SHIPPED | OUTPATIENT
Start: 2023-12-18

## 2023-12-18 RX ORDER — SULFAMETHOXAZOLE AND TRIMETHOPRIM 800; 160 MG/1; MG/1
1 TABLET ORAL EVERY 12 HOURS SCHEDULED
Qty: 20 TABLET | Refills: 0 | Status: SHIPPED | OUTPATIENT
Start: 2023-12-18 | End: 2023-12-30

## 2023-12-18 NOTE — ASSESSMENT & PLAN NOTE
Recommend follow-up with Naomi at next scheduled visit and discuss about Eliquis.  It appears that they had recommended initially to use for only 6 months.

## 2023-12-18 NOTE — PATIENT INSTRUCTIONS
1. Acute non-recurrent maxillary sinusitis  -     sulfamethoxazole-trimethoprim (Bactrim DS) 800-160 mg per tablet; Take 1 tablet by mouth every 12 (twelve) hours for 10 days    2. Dermatitis  Assessment & Plan:  At this point, the patient has been using triamcinolone with nystatin for quite a while.  Would transition to just nystatin.  Prefer not to use topical steroid long-term.  Follow-up with Naomi at her next scheduled visit.      Orders:  -     nystatin (MYCOSTATIN) cream; Apply topically 2 (two) times a day    3. 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  She has not really checked recently.  No specific changes needed.      4. Primary hypertension  Assessment & Plan:  Patient is currently doing quite well as far as blood pressure.  No changes are needed.  Continue hydrochlorothiazide, metoprolol.      5. Other acute pulmonary embolism without acute cor pulmonale (HCC)  Assessment & Plan:  Recommend follow-up with Naomi at next scheduled visit and discuss about Eliquis.  It appears that they had recommended initially to use for only 6 months.          COVID 19 Instructions    Jolene Segundo was advised to limit contact with others to essential tasks such as getting food, medications, and medical care.    Proper handwashing reviewed, and Hand sanitzer when washing is not available.    If the patient develops symptoms of COVID 19, the patient should call the office as soon as possible.    It is strongly recommended that Flu Vaccinations be obtained.      Virtual Visits:  Anthony: This works on smart phones (any phone with Internet browsing capability).  You should get a text message when the provider is ready to see you.  Click on the link in the text message,  and the call should start.  You will need to type in your name, and allow camera and microphone access.  This is HIPPA compliant, and secure.      If you have not already done so, get immunized to COVID 19.      We are committed to getting you vaccinated as soon as possible and will be closely following CDC and [facility] guidelines as they are released and revised.  Please refer to our COVID-19 vaccine webpage for the most up to date information on the vaccine and our distribution efforts.    This site will also have the most up to date recommendations for COVID booster vaccine.    [email or web address]    Call 3-120-[phone] ([phone]), option 7    You can also visit https://www.vaccines.gov/ to find vaccines in your area.    OUR LOCATION:    [facility]  [address] [place] [address], [address]  [place], [place], [address] [phone]  Fax: [phone]    Lab services, Rheumatology, and OB/GYN are at this location as well.    Thank you for your inquiry about the RSV vaccine.  As you can see below, the CDC has recommended that you discuss this with your Primary Care provider and decide if the vaccine is right for you.  This discussion can be accomplished at your next office visit.  The vaccine is currently available at your local pharmacy if you choose to get it prior to your next office visit.     Respiratory syncytial (sin-[facility]) virus, or RSV, is a common respiratory virus that usually causes mild, cold-like symptoms. Most people recover in a week or two, but RSV can be serious. Infants and older adults are more likely to develop severe RSV and need hospitalization. Vaccines are available to protect older adults from severe RSV. Monoclonal antibody products are available to protect infants and young children from severe RSV.    CDC Recommendations  Adults 60 years old and over  Adults 60 years of age and older may receive a single dose of RSV vaccine using  shared clinical decision-making.    Infants and young children  1 dose of nirsevimab for all infants younger than 8 months born during or entering their first RSV season.  1 dose of nirsevimab for infants and children 8-19 months old who are at increased risk for severe RSV disease and entering their second RSV season.  Note: A different monoclonal antibody, palivizumab, is limited to children under 24 months of age with certain conditions that place them at high risk for severe RSV disease. It must be given once a month during RSV season. Please see AAP guidelines for palivizumab.    Pregnant people  1 dose of maternal RSV vaccine during weeks 32 through 36 of pregnancy, administered immediately before or during RSV season. Abrysvo is the only RSV vaccine recommended during pregnancy.    If you have any questions about RSV or the products above, talk to your healthcare provider.

## 2023-12-18 NOTE — ASSESSMENT & PLAN NOTE
At this point, the patient has been using triamcinolone with nystatin for quite a while.  Would transition to just nystatin.  Prefer not to use topical steroid long-term.  Follow-up with Naomi at her next scheduled visit.

## 2023-12-18 NOTE — ASSESSMENT & PLAN NOTE
Lab Results   Component Value Date    HGBA1C 5.4 07/11/2023   Sugars in the past have been quite good.  She has not really checked recently.  No specific changes needed.

## 2023-12-18 NOTE — PROGRESS NOTES
Name: Jolene Segundo      : 1940      MRN: [id number]  Encounter Provider: Raman Monson MD  Encounter Date: 2023   Encounter department: [facility] PRIMARY [facility]ARE    Assessment & Plan     1. Acute non-recurrent maxillary sinusitis  -     sulfamethoxazole-trimethoprim (Bactrim DS) 800-160 mg per tablet; Take 1 tablet by mouth every 12 (twelve) hours for 10 days    2. Dermatitis  Assessment & Plan:  At this point, the patient has been using triamcinolone with nystatin for quite a while.  Would transition to just nystatin.  Prefer not to use topical steroid long-term.  Follow-up with Naomi at her next scheduled visit.      Orders:  -     nystatin (MYCOSTATIN) cream; Apply topically 2 (two) times a day    3. 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  She has not really checked recently.  No specific changes needed.      4. Primary hypertension  Assessment & Plan:  Patient is currently doing quite well as far as blood pressure.  No changes are needed.  Continue hydrochlorothiazide, metoprolol.      5. Other acute pulmonary embolism without acute cor pulmonale (HCC)  Assessment & Plan:  Recommend follow-up with Naomi at next scheduled visit and discuss about Eliquis.  It appears that they had recommended initially to use for only 6 months.             Subjective      Chief Complaint   Patient presents with   • Cold Like Symptoms     Patient in office for symptoms of cough, sore throat, chills.       Sore throat, cough, sneezing, runny nose.  Symptoms.  She noted symptoms starting Saturday and .   was worse.  Some chills along with this.  Denies any face or tooth pain.  Denies any postnasal drip.        Review of Systems  "  Constitutional:  Positive for chills. Negative for appetite change, diaphoresis, fatigue and fever.   HENT:  Positive for sneezing and sore throat. Negative for congestion, dental problem, ear pain, facial swelling, postnasal drip, rhinorrhea, sinus pressure, sinus pain, trouble swallowing and voice change.    Respiratory:  Positive for cough. Negative for chest tightness, shortness of breath and wheezing.    Cardiovascular:  Negative for chest pain.   Gastrointestinal:  Negative for abdominal pain, diarrhea, nausea and vomiting.   Musculoskeletal:  Negative for myalgias.   Neurological:  Negative for headaches.       Current Outpatient Medications on File Prior to Visit   Medication Sig   • ALPRAZolam (XANAX) 0.25 mg tablet Take 1 tablet (0.25 mg total) by mouth daily as needed for anxiety   • ascorbic acid (VITAMIN C) 250 mg tablet Take 250 mg by mouth daily   • Cholecalciferol (VITAMIN D) 2000 units CAPS Take 1 capsule by mouth 2 (two) times a day   • Eliquis 5 MG TAKE 1 TABLET BY MOUTH TWICE A DAY   • hydrochlorothiazide (HYDRODIURIL) 25 mg tablet TAKE 1 TABLET (25 MG TOTAL) BY MOUTH DAILY.   • latanoprost (XALATAN) 0.005 % ophthalmic solution    • metoprolol tartrate (LOPRESSOR) 50 mg tablet TAKE 1 TABLET BY MOUTH EVERY 12 HOURS   • pravastatin (PRAVACHOL) 10 mg tablet TAKE 1 TABLET BY MOUTH EVERYDAY AT BEDTIME   • [DISCONTINUED] nystatin-triamcinolone (MYCOLOG-II) cream Apply topically 2 (two) times a day       Objective     /52 (BP Location: Left arm, Patient Position: Sitting, Cuff Size: Adult)   Pulse 71   Temp 97.7 °F (36.5 °C) (Temporal)   Resp 16   Ht 5' 2\" (1.575 m)   Wt 78.9 kg (174 lb)   BMI 31.83 kg/m²     Physical Exam  Raman Monson MD    "

## 2023-12-18 NOTE — ASSESSMENT & PLAN NOTE
Patient is currently doing quite well as far as blood pressure.  No changes are needed.  Continue hydrochlorothiazide, metoprolol.

## 2023-12-27 ENCOUNTER — OFFICE VISIT (OUTPATIENT)
Dept: FAMILY MEDICINE CLINIC | Facility: CLINIC | Age: 83
End: 2023-12-27
Payer: COMMERCIAL

## 2023-12-27 ENCOUNTER — LAB (OUTPATIENT)
Dept: LAB | Facility: CLINIC | Age: 83
DRG: 178 | End: 2023-12-27
Payer: COMMERCIAL

## 2023-12-27 ENCOUNTER — APPOINTMENT (OUTPATIENT)
Dept: RADIOLOGY | Facility: MEDICAL CENTER | Age: 83
DRG: 178 | End: 2023-12-27
Payer: COMMERCIAL

## 2023-12-27 VITALS
OXYGEN SATURATION: 95 % | RESPIRATION RATE: 18 BRPM | SYSTOLIC BLOOD PRESSURE: 138 MMHG | WEIGHT: 167 LBS | HEIGHT: 62 IN | DIASTOLIC BLOOD PRESSURE: 70 MMHG | HEART RATE: 113 BPM | BODY MASS INDEX: 30.73 KG/M2

## 2023-12-27 DIAGNOSIS — E11.311 TYPE 2 DIABETES MELLITUS WITH LEFT EYE AFFECTED BY RETINOPATHY AND MACULAR EDEMA, WITHOUT LONG-TERM CURRENT USE OF INSULIN, UNSPECIFIED RETINOPATHY SEVERITY (HCC): ICD-10-CM

## 2023-12-27 DIAGNOSIS — J18.9 COMMUNITY ACQUIRED PNEUMONIA OF LEFT UPPER LOBE OF LUNG: ICD-10-CM

## 2023-12-27 DIAGNOSIS — J18.9 COMMUNITY ACQUIRED PNEUMONIA OF LEFT UPPER LOBE OF LUNG: Primary | ICD-10-CM

## 2023-12-27 DIAGNOSIS — E83.52 HYPERCALCEMIA: ICD-10-CM

## 2023-12-27 DIAGNOSIS — E87.6 HYPOKALEMIA: ICD-10-CM

## 2023-12-27 DIAGNOSIS — I10 PRIMARY HYPERTENSION: ICD-10-CM

## 2023-12-27 DIAGNOSIS — D72.819 LEUKOPENIA, UNSPECIFIED TYPE: Primary | ICD-10-CM

## 2023-12-27 DIAGNOSIS — K59.00 CONSTIPATION, UNSPECIFIED CONSTIPATION TYPE: ICD-10-CM

## 2023-12-27 LAB
ALBUMIN SERPL BCP-MCNC: 4 G/DL (ref 3.5–5)
ALP SERPL-CCNC: 46 U/L (ref 34–104)
ALT SERPL W P-5'-P-CCNC: 30 U/L (ref 7–52)
ANION GAP SERPL CALCULATED.3IONS-SCNC: 7 MMOL/L
AST SERPL W P-5'-P-CCNC: 40 U/L (ref 13–39)
BASOPHILS # BLD AUTO: 0.02 THOUSANDS/ÂΜL (ref 0–0.1)
BASOPHILS NFR BLD AUTO: 1 % (ref 0–1)
BILIRUB SERPL-MCNC: 0.71 MG/DL (ref 0.2–1)
BUN SERPL-MCNC: 26 MG/DL (ref 5–25)
CALCIUM SERPL-MCNC: 10.4 MG/DL (ref 8.4–10.2)
CHLORIDE SERPL-SCNC: 88 MMOL/L (ref 96–108)
CO2 SERPL-SCNC: 31 MMOL/L (ref 21–32)
CREAT SERPL-MCNC: 1.24 MG/DL (ref 0.6–1.3)
EOSINOPHIL # BLD AUTO: 0 THOUSAND/ÂΜL (ref 0–0.61)
EOSINOPHIL NFR BLD AUTO: 0 % (ref 0–6)
ERYTHROCYTE [DISTWIDTH] IN BLOOD BY AUTOMATED COUNT: 13.3 % (ref 11.6–15.1)
GFR SERPL CREATININE-BSD FRML MDRD: 40 ML/MIN/1.73SQ M
GLUCOSE SERPL-MCNC: 106 MG/DL (ref 65–140)
HCT VFR BLD AUTO: 38.6 % (ref 34.8–46.1)
HGB BLD-MCNC: 13.1 G/DL (ref 11.5–15.4)
IMM GRANULOCYTES # BLD AUTO: 0.03 THOUSAND/UL (ref 0–0.2)
IMM GRANULOCYTES NFR BLD AUTO: 1 % (ref 0–2)
LYMPHOCYTES # BLD AUTO: 0.58 THOUSANDS/ÂΜL (ref 0.6–4.47)
LYMPHOCYTES NFR BLD AUTO: 20 % (ref 14–44)
MCH RBC QN AUTO: 28.2 PG (ref 26.8–34.3)
MCHC RBC AUTO-ENTMCNC: 33.9 G/DL (ref 31.4–37.4)
MCV RBC AUTO: 83 FL (ref 82–98)
MONOCYTES # BLD AUTO: 0.29 THOUSAND/ÂΜL (ref 0.17–1.22)
MONOCYTES NFR BLD AUTO: 10 % (ref 4–12)
NEUTROPHILS # BLD AUTO: 1.96 THOUSANDS/ÂΜL (ref 1.85–7.62)
NEUTS SEG NFR BLD AUTO: 68 % (ref 43–75)
NRBC BLD AUTO-RTO: 0 /100 WBCS
PLATELET # BLD AUTO: 181 THOUSANDS/UL (ref 149–390)
PMV BLD AUTO: 11.5 FL (ref 8.9–12.7)
POTASSIUM SERPL-SCNC: 3.4 MMOL/L (ref 3.5–5.3)
PROT SERPL-MCNC: 6.9 G/DL (ref 6.4–8.4)
RBC # BLD AUTO: 4.64 MILLION/UL (ref 3.81–5.12)
SARS-COV-2 AG UPPER RESP QL IA: NEGATIVE
SODIUM SERPL-SCNC: 126 MMOL/L (ref 135–147)
TSH SERPL DL<=0.05 MIU/L-ACNC: 0.64 UIU/ML (ref 0.45–4.5)
VALID CONTROL: NORMAL
WBC # BLD AUTO: 2.88 THOUSAND/UL (ref 4.31–10.16)

## 2023-12-27 PROCEDURE — 85025 COMPLETE CBC W/AUTO DIFF WBC: CPT

## 2023-12-27 PROCEDURE — 71046 X-RAY EXAM CHEST 2 VIEWS: CPT

## 2023-12-27 PROCEDURE — 84443 ASSAY THYROID STIM HORMONE: CPT

## 2023-12-27 PROCEDURE — 99214 OFFICE O/P EST MOD 30 MIN: CPT | Performed by: FAMILY MEDICINE

## 2023-12-27 PROCEDURE — 80053 COMPREHEN METABOLIC PANEL: CPT

## 2023-12-27 PROCEDURE — 87811 SARS-COV-2 COVID19 W/OPTIC: CPT | Performed by: FAMILY MEDICINE

## 2023-12-27 PROCEDURE — 36415 COLL VENOUS BLD VENIPUNCTURE: CPT

## 2023-12-27 RX ORDER — AZITHROMYCIN 250 MG/1
TABLET, FILM COATED ORAL
Qty: 6 TABLET | Refills: 0 | Status: SHIPPED | OUTPATIENT
Start: 2023-12-27 | End: 2024-01-01

## 2023-12-27 NOTE — PROGRESS NOTES
Name: Jolene Segundo      : 1940      MRN: [id number]  Encounter Provider: Raman Monson MD  Encounter Date: 2023   Encounter department: [facility] PRIMARY [facility]ARE    Assessment & Plan     1. Community acquired pneumonia of left upper lobe of lung  Comments:  Likely pneumonia with findings on exam today.  Consistent with symptoms.  Check stat chest x-ray, as well as labs.  Orders:  -     CBC and differential; Future  -     Comprehensive metabolic panel; Future; Expected date: 2023  -     TSH, 3rd generation; Future; Expected date: 2023  -     XR chest pa & lateral; Future; Expected date: 2023  -     POCT Rapid Covid Ag  -     azithromycin (ZITHROMAX) 250 mg tablet; Take 2 tablets on day 1, then 1 tablet daily days 2 through 5    2. Primary hypertension  Assessment & Plan:  Blood pressure stable.  Seems to be at goal for her age.  No change.    Orders:  -     TSH, 3rd generation; Future; Expected date: 2023    3. Hypokalemia  Assessment & Plan:  Potassium has been low in the past.  Recheck labs to see if that is the cause of fatigue.    Orders:  -     TSH, 3rd generation; Future; Expected date: 2023    4. Hypercalcemia  Assessment & Plan:  Calcium was somewhat elevated previously.  Recheck.  Could be because of some of the fatigue.    Orders:  -     TSH, 3rd generation; Future; Expected date: 2023    5. 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  She does have follow-up scheduled with regular PCP.  Will continue to check labs and follow-up per their recommendations.      Orders:  -     TSH, 3rd generation; Future; Expected date: 2023    6. Constipation, unspecified constipation type  Assessment & Plan:  Patient does have some  issues with constipation lately.  Recommend continue with increased hydration, consider fiber versus MiraLAX.          Depression Screening and Follow-up Plan: Patient was screened for depression during today's encounter. They screened negative with a PHQ-2 score of 0.        Subjective      Patient is here with some issues.     has COVID.  Was recently diagnosed.  Placed on Paxlovid.  They did try to isolate as much as possible in the home, however there is certainly overlap.  She has noted some continued problems, despite her recent sinus infection treatment.  Symptoms have gotten worse from that.  Feels extremely tired.  Some nausea, especially with eating.  Denies any diarrhea.    Not Eating well  She also has been having some constipation issues, which is relatively new.    Patient does drink water and ginger ale.          Review of Systems   Constitutional:  Positive for activity change, appetite change, chills, diaphoresis and fatigue. Negative for fever (not tested at home).   HENT:  Positive for congestion and trouble swallowing. Negative for dental problem, postnasal drip, rhinorrhea, sinus pressure, sinus pain and sore throat.    Respiratory:  Positive for cough. Negative for shortness of breath and wheezing.    Cardiovascular: Negative.    Gastrointestinal:  Positive for constipation and nausea. Negative for abdominal pain, diarrhea and vomiting.   Genitourinary: Negative.    Musculoskeletal:  Positive for myalgias.   Skin: Negative.        Current Outpatient Medications on File Prior to Visit   Medication Sig   • ALPRAZolam (XANAX) 0.25 mg tablet Take 1 tablet (0.25 mg total) by mouth daily as needed for anxiety   • ascorbic acid (VITAMIN C) 250 mg tablet Take 250 mg by mouth daily   • Cholecalciferol (VITAMIN D) 2000 units CAPS Take 1 capsule by mouth 2 (two) times a day   • Eliquis 5 MG TAKE 1 TABLET BY MOUTH TWICE A DAY   • hydrochlorothiazide (HYDRODIURIL) 25 mg tablet TAKE 1 TABLET (25 MG  "TOTAL) BY MOUTH DAILY.   • latanoprost (XALATAN) 0.005 % ophthalmic solution    • metoprolol tartrate (LOPRESSOR) 50 mg tablet TAKE 1 TABLET BY MOUTH EVERY 12 HOURS   • nystatin (MYCOSTATIN) cream Apply topically 2 (two) times a day   • pravastatin (PRAVACHOL) 10 mg tablet TAKE 1 TABLET BY MOUTH EVERYDAY AT BEDTIME   • sulfamethoxazole-trimethoprim (Bactrim DS) 800-160 mg per tablet Take 1 tablet by mouth every 12 (twelve) hours for 10 days       Objective     /70 (BP Location: Right arm, Patient Position: Sitting, Cuff Size: Large)   Pulse (!) 113   Resp 18   Ht 5' 2\" (1.575 m)   Wt 75.8 kg (167 lb)   SpO2 95%   BMI 30.54 kg/m²     Physical Exam  Vitals and nursing note reviewed.   Constitutional:       Appearance: She is well-developed.   HENT:      Head: Normocephalic and atraumatic.   Cardiovascular:      Rate and Rhythm: Normal rate and regular rhythm.      Pulses:           Carotid pulses are 2+ on the right side and 2+ on the left side.     Heart sounds: Normal heart sounds.   Pulmonary:      Effort: Pulmonary effort is normal.      Breath sounds: Examination of the left-middle field reveals decreased breath sounds. Decreased breath sounds present. No wheezing or rales.       Chest:      Chest wall: No tenderness.       Raman Monson MD    "

## 2023-12-27 NOTE — PATIENT INSTRUCTIONS
1. Community acquired pneumonia of left upper lobe of lung  Comments:  Likely pneumonia with findings on exam today.  Consistent with symptoms.  Check stat chest x-ray, as well as labs.  Orders:  -     CBC and differential; Future  -     Comprehensive metabolic panel; Future; Expected date: 12/27/2023  -     TSH, 3rd generation; Future; Expected date: 12/27/2023  -     XR chest pa & lateral; Future; Expected date: 12/27/2023  -     POCT Rapid Covid Ag  -     azithromycin (ZITHROMAX) 250 mg tablet; Take 2 tablets on day 1, then 1 tablet daily days 2 through 5    2. Primary hypertension  Assessment & Plan:  Blood pressure stable.  Seems to be at goal for her age.  No change.    Orders:  -     TSH, 3rd generation; Future; Expected date: 12/27/2023    3. Hypokalemia  Assessment & Plan:  Potassium has been low in the past.  Recheck labs to see if that is the cause of fatigue.    Orders:  -     TSH, 3rd generation; Future; Expected date: 12/27/2023    4. Hypercalcemia  Assessment & Plan:  Calcium was somewhat elevated previously.  Recheck.  Could be because of some of the fatigue.    Orders:  -     TSH, 3rd generation; Future; Expected date: 12/27/2023    5. 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  She does have follow-up scheduled with regular PCP.  Will continue to check labs and follow-up per their recommendations.      Orders:  -     TSH, 3rd generation; Future; Expected date: 12/27/2023    6. Constipation, unspecified constipation type  Assessment & Plan:  Patient does have some issues with constipation lately.  Recommend continue with increased hydration, consider fiber versus MiraLAX.          COVID 19 Instructions    Jolene Ratna was advised to limit contact with others to essential tasks  such as getting food, medications, and medical care.    Proper handwashing reviewed, and Hand sanitzer when washing is not available.    If the patient develops symptoms of COVID 19, the patient should call the office as soon as possible.    It is strongly recommended that Flu Vaccinations be obtained.      Virtual Visits:  Anthony: This works on smart phones (any phone with Internet browsing capability).  You should get a text message when the provider is ready to see you.  Click on the link in the text message, and the call should start.  You will need to type in your name, and allow camera and microphone access.  This is HIPPA compliant, and secure.      If you have not already done so, get immunized to COVID 19.      We are committed to getting you vaccinated as soon as possible and will be closely following CDC and [facility] guidelines as they are released and revised.  Please refer to our COVID-19 vaccine webpage for the most up to date information on the vaccine and our distribution efforts.    This site will also have the most up to date recommendations for COVID booster vaccine.    [email or web address]    Call [phone] ([phone]), option 7    You can also visit https://www.vaccines.gov/ to find vaccines in your area.    OUR LOCATION:    [facility]  [address] [place] [address], [address]  [place], [place], [address] [phone]  Fax: [phone]    Lab services, Rheumatology, and OB/GYN are at this location as well.

## 2023-12-27 NOTE — ASSESSMENT & PLAN NOTE
Lab Results   Component Value Date    HGBA1C 5.4 07/11/2023   Stable for now.  She does have follow-up scheduled with regular PCP.  Will continue to check labs and follow-up per their recommendations.

## 2023-12-27 NOTE — ASSESSMENT & PLAN NOTE
Patient does have some issues with constipation lately.  Recommend continue with increased hydration, consider fiber versus MiraLAX.

## 2023-12-28 ENCOUNTER — TELEPHONE (OUTPATIENT)
Dept: HEMATOLOGY ONCOLOGY | Facility: CLINIC | Age: 83
End: 2023-12-28

## 2023-12-28 ENCOUNTER — HOSPITAL ENCOUNTER (INPATIENT)
Facility: HOSPITAL | Age: 83
LOS: 2 days | Discharge: HOME | DRG: 178 | End: 2023-12-30
Attending: EMERGENCY MEDICINE
Payer: COMMERCIAL

## 2023-12-28 ENCOUNTER — APPOINTMENT (EMERGENCY)
Dept: CT IMAGING | Facility: HOSPITAL | Age: 83
DRG: 178 | End: 2023-12-28
Payer: COMMERCIAL

## 2023-12-28 DIAGNOSIS — E83.42 HYPOMAGNESEMIA: ICD-10-CM

## 2023-12-28 DIAGNOSIS — R06.02 SOB (SHORTNESS OF BREATH): ICD-10-CM

## 2023-12-28 DIAGNOSIS — E87.1 HYPONATREMIA: ICD-10-CM

## 2023-12-28 DIAGNOSIS — U07.1 LAB TEST POSITIVE FOR DETECTION OF COVID-19 VIRUS: ICD-10-CM

## 2023-12-28 DIAGNOSIS — E87.6 HYPOKALEMIA: ICD-10-CM

## 2023-12-28 DIAGNOSIS — R53.1 WEAKNESS: Primary | ICD-10-CM

## 2023-12-28 PROBLEM — E04.1 THYROID NODULE: Status: ACTIVE | Noted: 2023-12-28

## 2023-12-28 PROBLEM — N17.9 AKI (ACUTE KIDNEY INJURY) (HCC): Status: ACTIVE | Noted: 2023-12-28

## 2023-12-28 LAB
ALBUMIN SERPL BCP-MCNC: 3.7 G/DL (ref 3.5–5)
ALP SERPL-CCNC: 45 U/L (ref 34–104)
ALT SERPL W P-5'-P-CCNC: 33 U/L (ref 7–52)
ANION GAP SERPL CALCULATED.3IONS-SCNC: 10 MMOL/L
APTT PPP: 46 SECONDS (ref 23–37)
AST SERPL W P-5'-P-CCNC: 39 U/L (ref 13–39)
BASOPHILS # BLD AUTO: 0.03 THOUSANDS/ÂΜL (ref 0–0.1)
BASOPHILS NFR BLD AUTO: 1 % (ref 0–1)
BILIRUB SERPL-MCNC: 0.95 MG/DL (ref 0.2–1)
BILIRUB UR QL STRIP: NEGATIVE
BNP SERPL-MCNC: 91 PG/ML (ref 0–100)
BUN SERPL-MCNC: 30 MG/DL (ref 5–25)
CALCIUM SERPL-MCNC: 9.8 MG/DL (ref 8.4–10.2)
CARDIAC TROPONIN I PNL SERPL HS: 12 NG/L
CHLORIDE SERPL-SCNC: 86 MMOL/L (ref 96–108)
CK SERPL-CCNC: 131 U/L (ref 26–192)
CLARITY UR: CLEAR
CO2 SERPL-SCNC: 29 MMOL/L (ref 21–32)
COLOR UR: YELLOW
CREAT SERPL-MCNC: 1.38 MG/DL (ref 0.6–1.3)
CRP SERPL QL: 104.3 MG/L
EOSINOPHIL # BLD AUTO: 0.06 THOUSAND/ÂΜL (ref 0–0.61)
EOSINOPHIL NFR BLD AUTO: 1 % (ref 0–6)
ERYTHROCYTE [DISTWIDTH] IN BLOOD BY AUTOMATED COUNT: 13.1 % (ref 11.6–15.1)
FLUAV RNA RESP QL NAA+PROBE: NEGATIVE
FLUBV RNA RESP QL NAA+PROBE: NEGATIVE
GFR SERPL CREATININE-BSD FRML MDRD: 35 ML/MIN/1.73SQ M
GLUCOSE SERPL-MCNC: 100 MG/DL (ref 65–140)
GLUCOSE UR STRIP-MCNC: NEGATIVE MG/DL
HCT VFR BLD AUTO: 37.9 % (ref 34.8–46.1)
HGB BLD-MCNC: 12.9 G/DL (ref 11.5–15.4)
HGB UR QL STRIP.AUTO: NEGATIVE
IMM GRANULOCYTES # BLD AUTO: 0.05 THOUSAND/UL (ref 0–0.2)
IMM GRANULOCYTES NFR BLD AUTO: 1 % (ref 0–2)
INR PPP: 1.81 (ref 0.84–1.19)
KETONES UR STRIP-MCNC: ABNORMAL MG/DL
L PNEUMO1 AG UR QL IA.RAPID: NEGATIVE
LACTATE SERPL-SCNC: 1.6 MMOL/L (ref 0.5–2)
LEUKOCYTE ESTERASE UR QL STRIP: NEGATIVE
LYMPHOCYTES # BLD AUTO: 0.49 THOUSANDS/ÂΜL (ref 0.6–4.47)
LYMPHOCYTES NFR BLD AUTO: 12 % (ref 14–44)
MAGNESIUM SERPL-MCNC: 1.6 MG/DL (ref 1.9–2.7)
MCH RBC QN AUTO: 28 PG (ref 26.8–34.3)
MCHC RBC AUTO-ENTMCNC: 34 G/DL (ref 31.4–37.4)
MCV RBC AUTO: 82 FL (ref 82–98)
MONOCYTES # BLD AUTO: 0.44 THOUSAND/ÂΜL (ref 0.17–1.22)
MONOCYTES NFR BLD AUTO: 11 % (ref 4–12)
NEUTROPHILS # BLD AUTO: 3.08 THOUSANDS/ÂΜL (ref 1.85–7.62)
NEUTS SEG NFR BLD AUTO: 74 % (ref 43–75)
NITRITE UR QL STRIP: NEGATIVE
NRBC BLD AUTO-RTO: 0 /100 WBCS
PH UR STRIP.AUTO: 5.5 [PH]
PLATELET # BLD AUTO: 160 THOUSANDS/UL (ref 149–390)
PMV BLD AUTO: 10.8 FL (ref 8.9–12.7)
POTASSIUM SERPL-SCNC: 2.8 MMOL/L (ref 3.5–5.3)
PROCALCITONIN SERPL-MCNC: 0.48 NG/ML
PROT SERPL-MCNC: 6.3 G/DL (ref 6.4–8.4)
PROT UR STRIP-MCNC: NEGATIVE MG/DL
PROTHROMBIN TIME: 21.2 SECONDS (ref 11.6–14.5)
RBC # BLD AUTO: 4.6 MILLION/UL (ref 3.81–5.12)
RSV RNA RESP QL NAA+PROBE: NEGATIVE
SARS-COV-2 RNA RESP QL NAA+PROBE: POSITIVE
SODIUM SERPL-SCNC: 125 MMOL/L (ref 135–147)
SP GR UR STRIP.AUTO: 1.01 (ref 1–1.03)
UROBILINOGEN UR STRIP-ACNC: <2 MG/DL
WBC # BLD AUTO: 4.15 THOUSAND/UL (ref 4.31–10.16)

## 2023-12-28 PROCEDURE — 99285 EMERGENCY DEPT VISIT HI MDM: CPT | Performed by: EMERGENCY MEDICINE

## 2023-12-28 PROCEDURE — 93005 ELECTROCARDIOGRAM TRACING: CPT

## 2023-12-28 PROCEDURE — 0241U HB NFCT DS VIR RESP RNA 4 TRGT: CPT | Performed by: EMERGENCY MEDICINE

## 2023-12-28 PROCEDURE — 84484 ASSAY OF TROPONIN QUANT: CPT

## 2023-12-28 PROCEDURE — 83735 ASSAY OF MAGNESIUM: CPT | Performed by: EMERGENCY MEDICINE

## 2023-12-28 PROCEDURE — 96361 HYDRATE IV INFUSION ADD-ON: CPT

## 2023-12-28 PROCEDURE — 80053 COMPREHEN METABOLIC PANEL: CPT | Performed by: EMERGENCY MEDICINE

## 2023-12-28 PROCEDURE — 99284 EMERGENCY DEPT VISIT MOD MDM: CPT

## 2023-12-28 PROCEDURE — 36415 COLL VENOUS BLD VENIPUNCTURE: CPT | Performed by: EMERGENCY MEDICINE

## 2023-12-28 PROCEDURE — 99223 1ST HOSP IP/OBS HIGH 75: CPT

## 2023-12-28 PROCEDURE — 86140 C-REACTIVE PROTEIN: CPT

## 2023-12-28 PROCEDURE — 85025 COMPLETE CBC W/AUTO DIFF WBC: CPT | Performed by: EMERGENCY MEDICINE

## 2023-12-28 PROCEDURE — 87449 NOS EACH ORGANISM AG IA: CPT | Performed by: EMERGENCY MEDICINE

## 2023-12-28 PROCEDURE — G1004 CDSM NDSC: HCPCS

## 2023-12-28 PROCEDURE — 85610 PROTHROMBIN TIME: CPT | Performed by: EMERGENCY MEDICINE

## 2023-12-28 PROCEDURE — 96365 THER/PROPH/DIAG IV INF INIT: CPT

## 2023-12-28 PROCEDURE — 85730 THROMBOPLASTIN TIME PARTIAL: CPT | Performed by: EMERGENCY MEDICINE

## 2023-12-28 PROCEDURE — 71250 CT THORAX DX C-: CPT

## 2023-12-28 PROCEDURE — 82550 ASSAY OF CK (CPK): CPT

## 2023-12-28 PROCEDURE — 84145 PROCALCITONIN (PCT): CPT | Performed by: EMERGENCY MEDICINE

## 2023-12-28 PROCEDURE — 83605 ASSAY OF LACTIC ACID: CPT | Performed by: EMERGENCY MEDICINE

## 2023-12-28 PROCEDURE — 96368 THER/DIAG CONCURRENT INF: CPT

## 2023-12-28 PROCEDURE — 83880 ASSAY OF NATRIURETIC PEPTIDE: CPT

## 2023-12-28 PROCEDURE — 81003 URINALYSIS AUTO W/O SCOPE: CPT | Performed by: EMERGENCY MEDICINE

## 2023-12-28 RX ORDER — SODIUM CHLORIDE 9 MG/ML
50 INJECTION, SOLUTION INTRAVENOUS CONTINUOUS
Status: DISPENSED | OUTPATIENT
Start: 2023-12-28 | End: 2023-12-29

## 2023-12-28 RX ORDER — MAGNESIUM HYDROXIDE/ALUMINUM HYDROXICE/SIMETHICONE 120; 1200; 1200 MG/30ML; MG/30ML; MG/30ML
30 SUSPENSION ORAL EVERY 6 HOURS PRN
Status: DISCONTINUED | OUTPATIENT
Start: 2023-12-28 | End: 2023-12-30 | Stop reason: HOSPADM

## 2023-12-28 RX ORDER — CEFTRIAXONE 1 G/50ML
1000 INJECTION, SOLUTION INTRAVENOUS EVERY 24 HOURS
Status: DISCONTINUED | OUTPATIENT
Start: 2023-12-28 | End: 2023-12-30 | Stop reason: HOSPADM

## 2023-12-28 RX ORDER — POTASSIUM CHLORIDE 20 MEQ/1
40 TABLET, EXTENDED RELEASE ORAL ONCE
Status: COMPLETED | OUTPATIENT
Start: 2023-12-28 | End: 2023-12-28

## 2023-12-28 RX ORDER — ONDANSETRON 2 MG/ML
4 INJECTION INTRAMUSCULAR; INTRAVENOUS EVERY 6 HOURS PRN
Status: DISCONTINUED | OUTPATIENT
Start: 2023-12-28 | End: 2023-12-30 | Stop reason: HOSPADM

## 2023-12-28 RX ORDER — MAGNESIUM SULFATE HEPTAHYDRATE 40 MG/ML
2 INJECTION, SOLUTION INTRAVENOUS ONCE
Status: COMPLETED | OUTPATIENT
Start: 2023-12-28 | End: 2023-12-28

## 2023-12-28 RX ORDER — POTASSIUM CHLORIDE 14.9 MG/ML
20 INJECTION INTRAVENOUS ONCE
Status: COMPLETED | OUTPATIENT
Start: 2023-12-28 | End: 2023-12-28

## 2023-12-28 RX ORDER — METOPROLOL TARTRATE 50 MG/1
50 TABLET, FILM COATED ORAL EVERY 12 HOURS SCHEDULED
Status: DISCONTINUED | OUTPATIENT
Start: 2023-12-28 | End: 2023-12-30 | Stop reason: HOSPADM

## 2023-12-28 RX ORDER — PRAVASTATIN SODIUM 10 MG
10 TABLET ORAL
Status: DISCONTINUED | OUTPATIENT
Start: 2023-12-28 | End: 2023-12-30 | Stop reason: HOSPADM

## 2023-12-28 RX ORDER — ACETAMINOPHEN 325 MG/1
650 TABLET ORAL EVERY 6 HOURS PRN
Status: DISCONTINUED | OUTPATIENT
Start: 2023-12-28 | End: 2023-12-30 | Stop reason: HOSPADM

## 2023-12-28 RX ADMIN — MAGNESIUM SULFATE HEPTAHYDRATE 2 G: 40 INJECTION, SOLUTION INTRAVENOUS at 19:57

## 2023-12-28 RX ADMIN — SODIUM CHLORIDE 1000 ML: 0.9 INJECTION, SOLUTION INTRAVENOUS at 18:53

## 2023-12-28 RX ADMIN — POTASSIUM CHLORIDE 20 MEQ: 14.9 INJECTION, SOLUTION INTRAVENOUS at 19:50

## 2023-12-28 RX ADMIN — REMDESIVIR 200 MG: 100 INJECTION, POWDER, LYOPHILIZED, FOR SOLUTION INTRAVENOUS at 21:13

## 2023-12-28 RX ADMIN — POTASSIUM CHLORIDE 40 MEQ: 1500 TABLET, EXTENDED RELEASE ORAL at 19:47

## 2023-12-28 NOTE — TELEPHONE ENCOUNTER
I called Jolene in response to a referral that was received for patient to establish care with Hematology.     Outreach was made to schedule a consultation.    Patients  states that they are unaware of the referral and will reach out to the referring provider. The [employer] number [phone] was provided.    Another attempt will be made to contact patient.

## 2023-12-28 NOTE — RESULT ENCOUNTER NOTE
Tests were not normal, and should follow at  your scheduled Office visit. Please call about this, if [facility] message is not available or not read by patient.  Patient sodium level is quite low at 126.  Would recommend emergency room for evaluation and repletion of the sodium.

## 2023-12-28 NOTE — RESULT ENCOUNTER NOTE
Tests were not normal, and should follow at  your scheduled Office visit. Please call about this, if [employer] message is not available or not read by patient.  White count is extremely low.  Recommend follow-up with hematology/oncology.  This is for blood follow-up.

## 2023-12-28 NOTE — ED PROVIDER NOTES
History  Chief Complaint   Patient presents with    Abnormal Lab     Reports they were told to come in for further evaluation of low sodium and pneumonia.      Patient is an 83-year-old female here with .  Patient has a history of hypertension, diabetes, constipation as well as hyperlipidemia coming in today reporting that she was called by her PCPs office stating that she had low sodium.  Recently patient was just seen by her family doctor yesterday.  Patient with her  at bedside, he provides history and he states that he was recently diagnosed with COVID before [place].  He was sure he passed COVID onto her. She was negative for COVID at home yesterday.  She has been having increasing fatigue, weakness, decreased p.o. intake and cough.  Patient denies any nausea, vomiting.  She had some loose stools without any diarrhea.  She has no bright red blood per rectum.  Patient was seen at her PCP and had x-ray and lab work done.  They report that the x-ray and lab work were consistent with pneumonia.  She started Z-Shubham yesterday and has a total of 2 doses.  They received a phone call today stating that her sodium was low and needs to come to the ER for further workup.      Review of chart shows chest x-ray on 12/27 was read by radiologist as normal.  PCP documented concern for community-acquired pneumonia in conjunction with hyponatremia.      History provided by:  Patient, medical records and spouse   used: No    Evaluation of Abnormal Diagnostic Test  Time since result:  Yesterday  Patient referred by:  PCP  Resulting agency:  [facility]  Result type: chemistry    Chemistry:     Sodium:  Low    Prior to Admission Medications   Prescriptions Last Dose Informant Patient Reported? Taking?   ALPRAZolam (XANAX) 0.25 mg tablet  Spouse/Significant Other, Self No No   Sig: Take 1 tablet (0.25 mg total) by mouth daily as needed for anxiety   Cholecalciferol (VITAMIN D) 2000 units CAPS   Spouse/Significant Other, Self Yes No   Sig: Take 1 capsule by mouth 2 (two) times a day   Eliquis 5 MG  Self, Spouse/Significant Other No No   Sig: TAKE 1 TABLET BY MOUTH TWICE A DAY   ascorbic acid (VITAMIN C) 250 mg tablet  Spouse/Significant Other, Self Yes No   Sig: Take 250 mg by mouth daily   azithromycin (ZITHROMAX) 250 mg tablet   No No   Sig: Take 2 tablets on day 1, then 1 tablet daily days 2 through 5   hydrochlorothiazide (HYDRODIURIL) 25 mg tablet  Spouse/Significant Other, Self No No   Sig: TAKE 1 TABLET (25 MG TOTAL) BY MOUTH DAILY.   latanoprost (XALATAN) 0.005 % ophthalmic solution  Spouse/Significant Other, Self Yes No   metoprolol tartrate (LOPRESSOR) 50 mg tablet   No No   Sig: TAKE 1 TABLET BY MOUTH EVERY 12 HOURS   nystatin (MYCOSTATIN) cream   No No   Sig: Apply topically 2 (two) times a day   pravastatin (PRAVACHOL) 10 mg tablet   No No   Sig: TAKE 1 TABLET BY MOUTH EVERYDAY AT BEDTIME   sulfamethoxazole-trimethoprim (Bactrim DS) 800-160 mg per tablet   No No   Sig: Take 1 tablet by mouth every 12 (twelve) hours for 10 days      Facility-Administered Medications: None       Past Medical History:   Diagnosis Date    Diabetes mellitus (HCC)     Hemorrhoids     Hyperlipidemia     Hypertension     ovarian     Pseudopolyposis of colon without complication, unspecified part of colon (HCC) 07/01/2022       Past Surgical History:   Procedure Laterality Date    BREAST BIOPSY      Bx breast percutan needle core use image guide (stereotactic)    CATARACT EXTRACTION      COLONOSCOPY      HEMORROIDECTOMY      KS LIGATION/BIOPSY TEMPORAL ARTERY Bilateral 10/17/2022    Procedure: BIOPSY ARTERY TEMPORAL;  Surgeon: Mary Eldridge DO;  Location: [facility];  Service: Vascular    TOTAL ABDOMINAL HYSTERECTOMY         Family History   Problem Relation Age of Onset    Heart disease Mother     Lung cancer Mother     Uterine cancer Mother     Heart disease Father     Breast cancer Sister     Diabetes Maternal  Grandmother      I have reviewed and agree with the history as documented.    E-Cigarette/Vaping     E-Cigarette/Vaping Substances    Nicotine No     THC No     CBD No      Social History     Tobacco Use    Smoking status: Never    Smokeless tobacco: Never   Substance Use Topics    Alcohol use: Not Currently     Comment: rarely    Drug use: Never       Review of Systems   Constitutional:  Positive for fatigue. Negative for chills and fever.   HENT:  Negative for ear pain and sore throat.    Eyes:  Negative for pain and visual disturbance.   Respiratory:  Positive for cough. Negative for shortness of breath.    Cardiovascular:  Negative for chest pain and palpitations.   Gastrointestinal:  Negative for abdominal pain and vomiting.   Genitourinary:  Negative for dysuria and hematuria.   Musculoskeletal:  Negative for arthralgias and back pain.   Skin:  Negative for color change and rash.   Neurological:  Negative for seizures and syncope.   All other systems reviewed and are negative.      Physical Exam  Physical Exam  Vitals and nursing note reviewed.   Constitutional:       General: She is not in acute distress.     Appearance: She is well-developed.   HENT:      Head: Normocephalic and atraumatic.      Comments: Patient maintaining airway and secretions. No stridor . No brawniness under tongue.          Mouth/Throat:      Mouth: Mucous membranes are dry.   Eyes:      Extraocular Movements: Extraocular movements intact.      Conjunctiva/sclera: Conjunctivae normal.      Pupils: Pupils are equal, round, and reactive to light.   Neck:      Comments: No meningeal signs  Cardiovascular:      Rate and Rhythm: Normal rate and regular rhythm.      Pulses:           Radial pulses are 2+ on the right side and 2+ on the left side.        Dorsalis pedis pulses are 2+ on the right side and 2+ on the left side.      Heart sounds: Normal heart sounds, S1 normal and S2 normal. No murmur heard.  Pulmonary:      Effort: Pulmonary  effort is normal. No respiratory distress.      Breath sounds: Examination of the right-lower field reveals decreased breath sounds. Examination of the left-lower field reveals decreased breath sounds. Decreased breath sounds present.      Comments: No conversational dyspnea  Abdominal:      Palpations: Abdomen is soft.      Tenderness: There is no abdominal tenderness.   Musculoskeletal:         General: No swelling.      Cervical back: Normal range of motion and neck supple.      Right lower leg: No edema.      Left lower leg: No edema.   Lymphadenopathy:      Cervical: No cervical adenopathy.   Skin:     General: Skin is warm and dry.      Capillary Refill: Capillary refill takes less than 2 seconds.   Neurological:      General: No focal deficit present.      Mental Status: She is alert and oriented to person, place, and time.      GCS: GCS eye subscore is 4. GCS verbal subscore is 5. GCS motor subscore is 6.      Cranial Nerves: Cranial nerves 2-12 are intact.      Sensory: Sensation is intact.      Motor: Motor function is intact.      Coordination: Coordination is intact.      Comments: No slurred speech.  No facial asymmetry.  No tongue deviation   Psychiatric:         Mood and Affect: Mood normal.         Behavior: Behavior normal.         Thought Content: Thought content normal.         Judgment: Judgment normal.         Vital Signs  ED Triage Vitals [12/28/23 1718]   Temperature Pulse Respirations Blood Pressure SpO2   97.5 °F (36.4 °C) 83 18 122/73 94 %      Temp Source Heart Rate Source Patient Position - Orthostatic VS BP Location FiO2 (%)   Oral Monitor Sitting Right arm --      Pain Score       --           Vitals:    12/28/23 1718 12/28/23 1900 12/28/23 2117   BP: 122/73 142/64 158/68   Pulse: 83 100 89   Patient Position - Orthostatic VS: Sitting Sitting Sitting         Visual Acuity      ED Medications  Medications   remdesivir (Veklury) 200 mg in sodium chloride 0.9 % 290 mL IVPB (200 mg  Intravenous New Bag 12/28/23 2113)     Followed by   remdesivir (Veklury) 100 mg in sodium chloride 0.9 % 270 mL IVPB (has no administration in time range)   sodium chloride 0.9 % bolus 1,000 mL (0 mL Intravenous Stopped 12/28/23 2113)   magnesium sulfate 2 g/50 mL IVPB (premix) 2 g (0 g Intravenous Stopped 12/28/23 2042)   potassium chloride (K-DUR,KLOR-CON) CR tablet 40 mEq (40 mEq Oral Given 12/28/23 1947)   potassium chloride 20 mEq IVPB (premix) (20 mEq Intravenous New Bag 12/28/23 1950)       Diagnostic Studies  Results Reviewed       Procedure Component Value Units Date/Time    HS Troponin 0hr (reflex protocol) [[id number]] Collected: 12/28/23 2112    Lab Status: In process Specimen: Blood from Arm, Right Updated: 12/28/23 2122    UA (URINE) with reflex to Scope [[id number]]  (Abnormal) Collected: 12/28/23 1945    Lab Status: Final result Specimen: Urine, Clean Catch Updated: 12/28/23 2051     Color, UA Yellow     Clarity, UA Clear     Specific Gravity, UA 1.011     pH, UA 5.5     Leukocytes, UA Negative     Nitrite, UA Negative     Protein, UA Negative mg/dl      Glucose, UA Negative mg/dl      Ketones, UA Trace mg/dl      Urobilinogen, UA <2.0 mg/dl      Bilirubin, UA Negative     Occult Blood, UA Negative    C-reactive protein [[id number]]     Lab Status: No result Specimen: Blood     B-Type Natriuretic Peptide(BNP) [[id number]]     Lab Status: No result Specimen: Blood     CK [[id number]]     Lab Status: No result Specimen: Blood     Legionella antigen, urine [[id number]] Collected: 12/28/23 1945    Lab Status: In process Specimen: Urine, Clean Catch Updated: 12/28/23 1948    Procalcitonin [[id number]]  (Abnormal) Collected: 12/28/23 1849    Lab Status: Final result Specimen: Blood from Arm, Left Updated: 12/28/23 1937     Procalcitonin 0.48 ng/ml     FLU/RSV/COVID - if FLU/RSV clinically relevant [[id number]]  (Abnormal) Collected: 12/28/23 1849    Lab Status: Final result Specimen: Nares from Nose Updated:  12/28/23 1934     SARS-CoV-2 Positive     INFLUENZA A PCR Negative     INFLUENZA B PCR Negative     RSV PCR Negative    Narrative:      FOR PEDIATRIC PATIENTS - copy/paste COVID Guidelines URL to browser: [email or web address]    SARS-CoV-2 assay is a Nucleic Acid Amplification assay intended for the  qualitative detection of nucleic acid from SARS-CoV-2 in nasopharyngeal  swabs. Results are for the presumptive identification of SARS-CoV-2 RNA.    Positive results are indicative of infection with SARS-CoV-2, the virus  causing COVID-19, but do not rule out bacterial infection or co-infection  with other viruses. Laboratories within the [place] and its  territories are required to report all positive results to the appropriate  public health authorities. Negative results do not preclude SARS-CoV-2  infection and should not be used as the sole basis for treatment or other  patient management decisions. Negative results must be combined with  clinical observations, patient history, and epidemiological information.  This test has not been FDA cleared or approved.    This test has been authorized by FDA under an Emergency Use Authorization  (EUA). This test is only authorized for the duration of time the  declaration that circumstances exist justifying the authorization of the  emergency use of an in vitro diagnostic tests for detection of SARS-CoV-2  virus and/or diagnosis of COVID-19 infection under section 564(b)(1) of  the Act, 21 U.S.C. 360bbb-3(b)(1), unless the authorization is terminated  or revoked sooner. The test has been validated but independent review by FDA  and CLIA is pending.    Test performed using [employer]: This RT-PCR assay targets N2,  a region unique to SARS-CoV-2. A conserved region in the E-gene was chosen  for pan-Sarbecovirus detection which includes SARS-CoV-2.    According to CMS-2020-01-R, this platform meets the definition  of high-BraveNewTalent technology.    Lactic acid, plasma (w/reflex if result > 2.0) [[id number]]  (Normal) Collected: 12/28/23 1849    Lab Status: Final result Specimen: Blood from Arm, Left Updated: 12/28/23 1929     LACTIC ACID 1.6 mmol/L     Narrative:      Result may be elevated if tourniquet was used during collection.    Comprehensive metabolic panel [[id number]]  (Abnormal) Collected: 12/28/23 1849    Lab Status: Final result Specimen: Blood from Arm, Left Updated: 12/28/23 1928     Sodium 125 mmol/L      Potassium 2.8 mmol/L      Chloride 86 mmol/L      CO2 29 mmol/L      ANION GAP 10 mmol/L      BUN 30 mg/dL      Creatinine 1.38 mg/dL      Glucose 100 mg/dL      Calcium 9.8 mg/dL      AST 39 U/L      ALT 33 U/L      Alkaline Phosphatase 45 U/L      Total Protein 6.3 g/dL      Albumin 3.7 g/dL      Total Bilirubin 0.95 mg/dL      eGFR 35 ml/min/1.73sq m     Narrative:      National Kidney Disease Foundation guidelines for Chronic Kidney Disease (CKD):     Stage 1 with normal or high GFR (GFR > 90 mL/min/1.73 square meters)    Stage 2 Mild CKD (GFR = 60-89 mL/min/1.73 square meters)    Stage 3A Moderate CKD (GFR = 45-59 mL/min/1.73 square meters)    Stage 3B Moderate CKD (GFR = 30-44 mL/min/1.73 square meters)    Stage 4 Severe CKD (GFR = 15-29 mL/min/1.73 square meters)    Stage 5 End Stage CKD (GFR <15 mL/min/1.73 square meters)  Note: GFR calculation is accurate only with a steady state creatinine    Magnesium [[id number]]  (Abnormal) Collected: 12/28/23 1849    Lab Status: Final result Specimen: Blood from Arm, Left Updated: 12/28/23 1928     Magnesium 1.6 mg/dL     Protime-INR [[id number]]  (Abnormal) Collected: 12/28/23 1849    Lab Status: Final result Specimen: Blood from Arm, Left Updated: 12/28/23 1922     Protime 21.2 seconds      INR 1.81    APTT [[id number]]  (Abnormal) Collected: 12/28/23 1849    Lab Status: Final result Specimen: Blood from Arm, Left Updated: 12/28/23 1922     PTT 46 seconds     CBC and  "differential [[id number]]  (Abnormal) Collected: 12/28/23 1849    Lab Status: Final result Specimen: Blood from Arm, Left Updated: 12/28/23 1858     WBC 4.15 Thousand/uL      RBC 4.60 Million/uL      Hemoglobin 12.9 g/dL      Hematocrit 37.9 %      MCV 82 fL      MCH 28.0 pg      MCHC 34.0 g/dL      RDW 13.1 %      MPV 10.8 fL      Platelets 160 Thousands/uL      nRBC 0 /100 WBCs      Neutrophils Relative 74 %      Immat GRANS % 1 %      Lymphocytes Relative 12 %      Monocytes Relative 11 %      Eosinophils Relative 1 %      Basophils Relative 1 %      Neutrophils Absolute 3.08 Thousands/µL      Immature Grans Absolute 0.05 Thousand/uL      Lymphocytes Absolute 0.49 Thousands/µL      Monocytes Absolute 0.44 Thousand/µL      Eosinophils Absolute 0.06 Thousand/µL      Basophils Absolute 0.03 Thousands/µL                    CT chest without contrast   Final Result by Ld Baez MD (12/28 2038)      No acute finding in the chest.      Incidental thyroid nodule(s) for which nonemergent thyroid ultrasound is recommended.      The study was marked in EPIC for immediate notification.         Workstation performed: [id number]                    Procedures  Procedures         ED Course  ED Course as of 12/28/23 2151   Thu Dec 28, 2023   1821 Patient is an 83-year-old female with  at bedside coming in today with abnormal labs.  Will review chart as well as repeat labs here as well as CT chest.  Patient resting in bed in no acute distress    Disclosure: Voice to text software was used in the preparation of this document and could have resulted in translational errors.      Occasional wrong word or \"sound a like\" substitutions may have occurred due to the inherent limitations of voice recognition software.  Read the chart carefully and recognize, using context, where substitutions have occurred.       I have independently reviewed external records are available to me to the level of detail possible within " the time constraints of my patient care responsibilities in the ED.       1933   Patient's sodium, potassium, magnesium level.  Will replace potassium magnesium.  Sodium is being replaced IV.     1948 COVID +   2000 Patient resting in bed.  Patient and family updated on COVID-positive as well as elevated Pro-Aaron with electrolyte abnormalities.  Will discuss with medicine for admission   2018 Given patient's age and comorbidities Slim is agreeable.  They do wish to wait for CT read.  Will be placed on telemetry due to hypokalemia.   2036 Patient and family updated on plan.  They are aware of admission but pending CT results.   2039 CT without acute findings except incidental thyroid nodule.  Will place admission orders as discussed with Lenin    Discussed with Lenin. We had a detailed discussion of the patient's condition and case,  including need for admission.  Accepts to his/her service.  Bed request/bridging orders placed.                                Initial Sepsis Screening       Row Name 12/28/23 2150                Is the patient's history suggestive of a new or worsening infection? Yes (Proceed)  -NB        Suspected source of infection suspect infection, source unknown  -NB        Indicate SIRS criteria --        Are two or more of the above signs & symptoms of infection both present and new to the patient? No  -NB                  User Key  (r) = Recorded By, (t) = Taken By, (c) = Cosigned By      Initials Name Provider Type    SHANTELL Butler DO Physician                    SBIRT 20yo+      Flowsheet Row Most Recent Value   Initial Alcohol Screen: US AUDIT-C     1. How often do you have a drink containing alcohol? 0 Filed at: 12/28/2023 1922   2. How many drinks containing alcohol do you have on a typical day you are drinking?  0 Filed at: 12/28/2023 1922   3a. Male UNDER 65: How often do you have five or more drinks on one occasion? 0 Filed at: 12/28/2023 1922   3b. FEMALE Any Age, or MALE 65+: How  often do you have 4 or more drinks on one occassion? 0 Filed at: 12/28/2023 1922   Audit-C Score 0 Filed at: 12/28/2023 1922   SAL: How many times in the past year have you...    Used an illegal drug or used a prescription medication for non-medical reasons? Never Filed at: 12/28/2023 1922                      Medical Decision Making      EKG INTERPRETATION @ 1842  RHYTHM: Sinus tachycardia at 102 bpm  AXIS: Normal axis  INTERVALS: SD interval measured at 206 ms consistent with first-degree AV block  QRS COMPLEX: QRS measured at 98 ms  ST SEGMENT: Nonspecific ST segment changes.  Diffuse artifact  QT INTERVAL: QTc measured at 4 1 ms  COMPARED WITH PRIOR compared to old EKG on December 2022 prolonged SD interval otherwise no acute change  Interpretation by Mylene Butler, DO    Differential includes but not limited to:  ACS, NSTEMI, acute kidney injury, dehydration, electrolyte dysfunction, liver failure, pancytopenia, anemia, hypothyroidism, hyperthyroidism, UTI, rhabdomyolysis, TIA, stroke, BPPV, sepsis, pneumonia, meningitis,        Amount and/or Complexity of Data Reviewed  Independent Historian: spouse     Details:  at bedside  External Data Reviewed: notes.  Labs: ordered. Decision-making details documented in ED Course.     Details: COVID-positive.  Negative flu RSV  No anemia or thrombocytopenia.  Neutropenia noted  Coags elevated.  Hyponatremia, hypokalemia and hypomagnesemia.  No acute kidney injury.  Lactic acid within normal limits  Procalcitonin mildly elevated  Radiology: ordered. Decision-making details documented in ED Course.     Details: CT with no acute pulmonary pathology but noted incidental finding of thyroid nodule  ECG/medicine tests: ordered and independent interpretation performed. Decision-making details documented in ED Course.     Details: No Ischemia or arrhythmia    Risk  Prescription drug management.  Decision regarding hospitalization.             Disposition  Final diagnoses:    Weakness   SOB (shortness of breath)   Hyponatremia   Hypokalemia   Hypomagnesemia   Lab test positive for detection of COVID-19 virus     Time reflects when diagnosis was documented in both MDM as applicable and the Disposition within this note       Time User Action Codes Description Comment    12/28/2023  8:01 PM BendEladio lopezle L Add [R53.1] Weakness     12/28/2023  8:01 PM Bendock, Mylene L Add [R06.02] SOB (shortness of breath)     12/28/2023  8:01 PM Bendock, Mylene L Add [E87.1] Hyponatremia     12/28/2023  8:01 PM Bendock, Mylene L Add [E87.6] Hypokalemia     12/28/2023  8:01 PM Bendjessica, Mylene L Add [E83.42] Hypomagnesemia     12/28/2023  8:41 PM Bendock, Mylene L Add [U07.1] Lab test positive for detection of COVID-19 virus           ED Disposition       ED Disposition   Admit    Condition   Stable    Date/Time   Thu Dec 28, 2023 2019    Comment   Case was discussed with Lenin and the patient's admission status was agreed to be Admission Status: inpatient status to the service of Dr. Aviles .               Follow-up Information    None         Patient's Medications   Discharge Prescriptions    No medications on file       No discharge procedures on file.    PDMP Review         Value Time User    PDMP Reviewed  Yes 10/27/2022  3:12 PM Raman Monson MD            ED Provider  Electronically Signed by             Mylene uBtler DO  12/28/23 8127

## 2023-12-29 LAB
2HR DELTA HS TROPONIN: 2 NG/L
4HR DELTA HS TROPONIN: 2 NG/L
ALBUMIN SERPL BCP-MCNC: 3.2 G/DL (ref 3.5–5)
ALP SERPL-CCNC: 41 U/L (ref 34–104)
ALT SERPL W P-5'-P-CCNC: 30 U/L (ref 7–52)
ANION GAP SERPL CALCULATED.3IONS-SCNC: 4 MMOL/L
ANION GAP SERPL CALCULATED.3IONS-SCNC: 6 MMOL/L
ANION GAP SERPL CALCULATED.3IONS-SCNC: 7 MMOL/L
ANION GAP SERPL CALCULATED.3IONS-SCNC: 9 MMOL/L
AST SERPL W P-5'-P-CCNC: 34 U/L (ref 13–39)
ATRIAL RATE: 102 BPM
ATRIAL RATE: 88 BPM
BASOPHILS # BLD MANUAL: 0 THOUSAND/UL (ref 0–0.1)
BASOPHILS NFR MAR MANUAL: 0 % (ref 0–1)
BILIRUB SERPL-MCNC: 0.56 MG/DL (ref 0.2–1)
BUN SERPL-MCNC: 19 MG/DL (ref 5–25)
BUN SERPL-MCNC: 19 MG/DL (ref 5–25)
BUN SERPL-MCNC: 21 MG/DL (ref 5–25)
BUN SERPL-MCNC: 25 MG/DL (ref 5–25)
CALCIUM ALBUM COR SERPL-MCNC: 9.7 MG/DL (ref 8.3–10.1)
CALCIUM SERPL-MCNC: 9.1 MG/DL (ref 8.4–10.2)
CALCIUM SERPL-MCNC: 9.3 MG/DL (ref 8.4–10.2)
CALCIUM SERPL-MCNC: 9.4 MG/DL (ref 8.4–10.2)
CALCIUM SERPL-MCNC: 9.6 MG/DL (ref 8.4–10.2)
CARDIAC TROPONIN I PNL SERPL HS: 14 NG/L
CARDIAC TROPONIN I PNL SERPL HS: 14 NG/L
CHLORIDE SERPL-SCNC: 92 MMOL/L (ref 96–108)
CHLORIDE SERPL-SCNC: 92 MMOL/L (ref 96–108)
CHLORIDE SERPL-SCNC: 93 MMOL/L (ref 96–108)
CHLORIDE SERPL-SCNC: 95 MMOL/L (ref 96–108)
CO2 SERPL-SCNC: 25 MMOL/L (ref 21–32)
CO2 SERPL-SCNC: 28 MMOL/L (ref 21–32)
CO2 SERPL-SCNC: 29 MMOL/L (ref 21–32)
CO2 SERPL-SCNC: 30 MMOL/L (ref 21–32)
CREAT SERPL-MCNC: 0.83 MG/DL (ref 0.6–1.3)
CREAT SERPL-MCNC: 0.84 MG/DL (ref 0.6–1.3)
CREAT SERPL-MCNC: 0.84 MG/DL (ref 0.6–1.3)
CREAT SERPL-MCNC: 1.03 MG/DL (ref 0.6–1.3)
CRP SERPL QL: 97.2 MG/L
EOSINOPHIL # BLD MANUAL: 0 THOUSAND/UL (ref 0–0.4)
EOSINOPHIL NFR BLD MANUAL: 0 % (ref 0–6)
ERYTHROCYTE [DISTWIDTH] IN BLOOD BY AUTOMATED COUNT: 13.2 % (ref 11.6–15.1)
GFR SERPL CREATININE-BSD FRML MDRD: 50 ML/MIN/1.73SQ M
GFR SERPL CREATININE-BSD FRML MDRD: 64 ML/MIN/1.73SQ M
GFR SERPL CREATININE-BSD FRML MDRD: 64 ML/MIN/1.73SQ M
GFR SERPL CREATININE-BSD FRML MDRD: 65 ML/MIN/1.73SQ M
GLUCOSE SERPL-MCNC: 103 MG/DL (ref 65–140)
GLUCOSE SERPL-MCNC: 110 MG/DL (ref 65–140)
GLUCOSE SERPL-MCNC: 97 MG/DL (ref 65–140)
GLUCOSE SERPL-MCNC: 98 MG/DL (ref 65–140)
HCT VFR BLD AUTO: 31.3 % (ref 34.8–46.1)
HGB BLD-MCNC: 11.1 G/DL (ref 11.5–15.4)
LYMPHOCYTES # BLD AUTO: 0.62 THOUSAND/UL (ref 0.6–4.47)
LYMPHOCYTES # BLD AUTO: 22 % (ref 14–44)
MCH RBC QN AUTO: 29 PG (ref 26.8–34.3)
MCHC RBC AUTO-ENTMCNC: 35.5 G/DL (ref 31.4–37.4)
MCV RBC AUTO: 82 FL (ref 82–98)
MONOCYTES # BLD AUTO: 0.25 THOUSAND/UL (ref 0–1.22)
MONOCYTES NFR BLD: 9 % (ref 4–12)
NEUTROPHILS # BLD MANUAL: 1.95 THOUSAND/UL (ref 1.85–7.62)
NEUTS BAND NFR BLD MANUAL: 1 % (ref 0–8)
NEUTS SEG NFR BLD AUTO: 68 % (ref 43–75)
P AXIS: 50 DEGREES
P AXIS: 59 DEGREES
PLATELET # BLD AUTO: 157 THOUSANDS/UL (ref 149–390)
PLATELET BLD QL SMEAR: ADEQUATE
PMV BLD AUTO: 10.8 FL (ref 8.9–12.7)
POTASSIUM SERPL-SCNC: 3 MMOL/L (ref 3.5–5.3)
POTASSIUM SERPL-SCNC: 3 MMOL/L (ref 3.5–5.3)
POTASSIUM SERPL-SCNC: 3.3 MMOL/L (ref 3.5–5.3)
POTASSIUM SERPL-SCNC: 4 MMOL/L (ref 3.5–5.3)
PR INTERVAL: 166 MS
PR INTERVAL: 206 MS
PROCALCITONIN SERPL-MCNC: 0.39 NG/ML
PROT SERPL-MCNC: 5.5 G/DL (ref 6.4–8.4)
QRS AXIS: 47 DEGREES
QRS AXIS: 49 DEGREES
QRSD INTERVAL: 92 MS
QRSD INTERVAL: 98 MS
QT INTERVAL: 308 MS
QT INTERVAL: 382 MS
QTC INTERVAL: 401 MS
QTC INTERVAL: 443 MS
RBC # BLD AUTO: 3.83 MILLION/UL (ref 3.81–5.12)
RBC MORPH BLD: NORMAL
SODIUM SERPL-SCNC: 126 MMOL/L (ref 135–147)
SODIUM SERPL-SCNC: 127 MMOL/L (ref 135–147)
SODIUM SERPL-SCNC: 128 MMOL/L (ref 135–147)
SODIUM SERPL-SCNC: 129 MMOL/L (ref 135–147)
T WAVE AXIS: -24 DEGREES
T WAVE AXIS: 38 DEGREES
VENTRICULAR RATE: 102 BPM
VENTRICULAR RATE: 81 BPM
WBC # BLD AUTO: 2.83 THOUSAND/UL (ref 4.31–10.16)

## 2023-12-29 PROCEDURE — 80048 BASIC METABOLIC PNL TOTAL CA: CPT

## 2023-12-29 PROCEDURE — 84145 PROCALCITONIN (PCT): CPT

## 2023-12-29 PROCEDURE — XW033E5 INTRODUCTION OF REMDESIVIR ANTI-INFECTIVE INTO PERIPHERAL VEIN, PERCUTANEOUS APPROACH, NEW TECHNOLOGY GROUP 5: ICD-10-PCS | Performed by: HOSPITALIST

## 2023-12-29 PROCEDURE — 84484 ASSAY OF TROPONIN QUANT: CPT

## 2023-12-29 PROCEDURE — 85007 BL SMEAR W/DIFF WBC COUNT: CPT

## 2023-12-29 PROCEDURE — 99232 SBSQ HOSP IP/OBS MODERATE 35: CPT | Performed by: HOSPITALIST

## 2023-12-29 PROCEDURE — 80053 COMPREHEN METABOLIC PANEL: CPT

## 2023-12-29 PROCEDURE — 86140 C-REACTIVE PROTEIN: CPT

## 2023-12-29 PROCEDURE — 97162 PT EVAL MOD COMPLEX 30 MIN: CPT

## 2023-12-29 PROCEDURE — 85027 COMPLETE CBC AUTOMATED: CPT

## 2023-12-29 PROCEDURE — 97166 OT EVAL MOD COMPLEX 45 MIN: CPT

## 2023-12-29 RX ORDER — POTASSIUM CHLORIDE 14.9 MG/ML
20 INJECTION INTRAVENOUS
Qty: 200 ML | Refills: 0 | Status: COMPLETED | OUTPATIENT
Start: 2023-12-29 | End: 2023-12-29

## 2023-12-29 RX ADMIN — SODIUM CHLORIDE 50 ML/HR: 0.9 INJECTION, SOLUTION INTRAVENOUS at 00:34

## 2023-12-29 RX ADMIN — CEFTRIAXONE 1000 MG: 1 INJECTION, SOLUTION INTRAVENOUS at 22:52

## 2023-12-29 RX ADMIN — CEFTRIAXONE 1000 MG: 1 INJECTION, SOLUTION INTRAVENOUS at 00:28

## 2023-12-29 RX ADMIN — POTASSIUM CHLORIDE 20 MEQ: 14.9 INJECTION, SOLUTION INTRAVENOUS at 14:16

## 2023-12-29 RX ADMIN — METOPROLOL TARTRATE 50 MG: 50 TABLET, FILM COATED ORAL at 00:27

## 2023-12-29 RX ADMIN — POTASSIUM CHLORIDE 20 MEQ: 14.9 INJECTION, SOLUTION INTRAVENOUS at 10:21

## 2023-12-29 RX ADMIN — APIXABAN 5 MG: 5 TABLET, FILM COATED ORAL at 08:25

## 2023-12-29 RX ADMIN — METOPROLOL TARTRATE 50 MG: 50 TABLET, FILM COATED ORAL at 08:25

## 2023-12-29 RX ADMIN — METOPROLOL TARTRATE 50 MG: 50 TABLET, FILM COATED ORAL at 20:04

## 2023-12-29 RX ADMIN — APIXABAN 5 MG: 5 TABLET, FILM COATED ORAL at 17:10

## 2023-12-29 RX ADMIN — PRAVASTATIN SODIUM 10 MG: 10 TABLET ORAL at 00:27

## 2023-12-29 RX ADMIN — REMDESIVIR 100 MG: 100 INJECTION, POWDER, LYOPHILIZED, FOR SOLUTION INTRAVENOUS at 20:04

## 2023-12-29 RX ADMIN — PRAVASTATIN SODIUM 10 MG: 10 TABLET ORAL at 22:46

## 2023-12-29 NOTE — PLAN OF CARE
Problem: Potential for Falls  Goal: Patient will remain free of falls  Description: INTERVENTIONS:  - Educate patient/family on patient safety including physical limitations  - Instruct patient to call for assistance with activity   - Consult OT/PT to assist with strengthening/mobility   - Keep Call bell within reach  - Keep bed low and locked with side rails adjusted as appropriate  - Keep care items and personal belongings within reach  - Initiate and maintain comfort rounds  - Make Fall Risk Sign visible to staff  - Offer Toileting every  Hours, in advance of need  - Initiate/Maintain alarm  - Obtain necessary fall risk management equipment  - Apply yellow socks and bracelet for high fall risk patients  - Consider moving patient to room near nurses station  Outcome: Progressing     Problem: CARDIOVASCULAR - ADULT  Goal: Maintains optimal cardiac output and hemodynamic stability  Description: INTERVENTIONS:  - Monitor I/O, vital signs and rhythm  - Monitor for S/S and trends of decreased cardiac output  - Administer and titrate ordered vasoactive medications to optimize hemodynamic stability  - Assess quality of pulses, skin color and temperature  - Assess for signs of decreased coronary artery perfusion  - Instruct patient to report change in severity of symptoms  Outcome: Progressing  Goal: Absence of cardiac dysrhythmias or at baseline rhythm  Description: INTERVENTIONS:  - Continuous cardiac monitoring, vital signs, obtain 12 lead EKG if ordered  - Administer antiarrhythmic and heart rate control medications as ordered  - Monitor electrolytes and administer replacement therapy as ordered  Outcome: Progressing     Problem: RESPIRATORY - ADULT  Goal: Achieves optimal ventilation and oxygenation  Description: INTERVENTIONS:  - Assess for changes in respiratory status  - Assess for changes in mentation and behavior  - Position to facilitate oxygenation and minimize respiratory effort  - Oxygen administered by  appropriate delivery if ordered  - Initiate smoking cessation education as indicated  - Encourage broncho-pulmonary hygiene including cough, deep breathe, Incentive Spirometry  - Assess the need for suctioning and aspirate as needed  - Assess and instruct to report SOB or any respiratory difficulty  - Respiratory Therapy support as indicated  Outcome: Progressing     Problem: METABOLIC, FLUID AND ELECTROLYTES - ADULT  Goal: Electrolytes maintained within normal limits  Description: INTERVENTIONS:  - Monitor labs and assess patient for signs and symptoms of electrolyte imbalances  - Administer electrolyte replacement as ordered  - Monitor response to electrolyte replacements, including repeat lab results as appropriate  - Instruct patient on fluid and nutrition as appropriate  Outcome: Progressing  Goal: Fluid balance maintained  Description: INTERVENTIONS:  - Monitor labs   - Monitor I/O and WT  - Instruct patient on fluid and nutrition as appropriate  - Assess for signs & symptoms of volume excess or deficit  Outcome: Progressing  Goal: Glucose maintained within target range  Description: INTERVENTIONS:  - Monitor Blood Glucose as ordered  - Assess for signs and symptoms of hyperglycemia and hypoglycemia  - Administer ordered medications to maintain glucose within target range  - Assess nutritional intake and initiate nutrition service referral as needed  Outcome: Progressing     Problem: HEMATOLOGIC - ADULT  Goal: Maintains hematologic stability  Description: INTERVENTIONS  - Assess for signs and symptoms of bleeding or hemorrhage  - Monitor labs  - Administer supportive blood products/factors as ordered and appropriate  Outcome: Progressing

## 2023-12-29 NOTE — PHYSICAL THERAPY NOTE
PHYSICAL THERAPY EVALUATION          Patient Name: Jolene Segundo  Today's Date: 12/29/2023  PT EVALUATION    83 y.o.    [id number]    Hypokalemia [E87.6]  Hypomagnesemia [E83.42]  Hyponatremia [E87.1]  Weakness [R53.1]  SOB (shortness of breath) [R06.02]  Abnormal laboratory test result [R89.9]  Lab test positive for detection of COVID-19 virus [U07.1]    Past Medical History:   Diagnosis Date    Diabetes mellitus (HCC)     Hemorrhoids     Hyperlipidemia     Hypertension     ovarian     Pseudopolyposis of colon without complication, unspecified part of colon (HCC) 07/01/2022     Past Surgical History:   Procedure Laterality Date    BREAST BIOPSY      Bx breast percutan needle core use image guide (stereotactic)    CATARACT EXTRACTION      COLONOSCOPY      HEMORROIDECTOMY      NH LIGATION/BIOPSY TEMPORAL ARTERY Bilateral 10/17/2022    Procedure: BIOPSY ARTERY TEMPORAL;  Surgeon: Mary Eldridge DO;  Location: [facility];  Service: Vascular    TOTAL ABDOMINAL HYSTERECTOMY          12/29/23 0851   PT Last Visit   PT Visit Date 12/29/23   Note Type   Note type Evaluation   Pain Assessment   Pain Assessment Tool 0-10   Pain Score No Pain   Restrictions/Precautions   Other Precautions Contact/isolation;Airborne/isolation;Fall Risk;Telemetry   Home Living   Type of Home House   Home Layout Stairs to enter without rails;Stairs to enter with rails;Two level;Laundry in basement;Able to live on main level with bedroom/bathroom   Bathroom Shower/Tub Tub/shower unit   Bathroom Toilet Standard   Bathroom Equipment Shower chair;Commode;Grab bars in shower;Grab bars around toilet   Home Equipment Walker;Cane  (hurrycane)   Additional Comments 1+1 w HR +1 DEANGELO.   Prior Function   Level of [place] Independent with ADLs;Independent with functional mobility;Independent with IADLS   Lives With Spouse   Receives Help From Family   IADLs Independent with driving;Independent with meal prep;Independent with  medication management  (rarely drives. spouse does laundry)   Falls in the last 6 months 0   Comments indep w use of cane prn. sposue home and able to assist.   General   Additional Pertinent History pt admitted 12/28/23 for covid. ambulate patient orders. PMHx significant for T2DM, covid, osteopenia, OA, anxiety, memory changes   Cognition   Overall Cognitive Status WFL   Arousal/Participation Cooperative   Attention Within functional limits   Orientation Level Oriented X4   Memory Within functional limits   Following Commands Follows all commands and directions without difficulty   Bed Mobility   Supine to Sit 6  Modified independent   Additional items Bedrails;Increased time required   Sit to Supine 7  Independent   Transfers   Sit to Stand 6  Modified independent   Additional items Increased time required   Stand to Sit 6  Modified independent   Additional items Increased time required;Other  (AD)   Ambulation/Elevation   Gait pattern Wide OBIE;Excessively slow   Gait Assistance 5  Supervision   Additional items Assist x 1   Assistive Device Other (Comment)  (hurrycane)   Distance 60'   Balance   Static Standing Fair +   Dynamic Standing Fair   Ambulatory Fair   Endurance Deficit   Endurance Deficit No   Activity Tolerance   Activity Tolerance Patient tolerated treatment well   Medical Staff Made Aware Elke OT   Assessment   Prognosis Good   Assessment Jolene Segundo is a 83 y.o. female admitted to [facility] on 12/28/2023 for COVID-19. PT was consulted and pt was seen on 12/29/2023 for mobility assessment and d/c planning. Pt presents w medium fall risk, contact and airborne isolation, telemetry monitoring. At baseline is indep for ADLs and ambulation. Uses Hurrycane prn. Pt is currently functioning at a mod I- indep for bed mobility, mod I transfers and S ambulation w hurrycane. Pt demonstrated ability to ambulate household distances without difficulty. Mild gait deviations but no gross LOB. Does not demonstrate  need for continued IPPT but would benefit from daily mobilization via nsg/restorative. The patient's AM-PAC Basic Mobility Inpatient Short Form Raw Score is 23. A Raw score of greater than 16 suggests the patient may benefit from discharge to home. Please also refer to the recommendation of the Physical Therapist for safe discharge planning.   Barriers to Discharge None   Plan   PT Frequency   (d/c PT; maintain on restorative)   Discharge Recommendation   Rehab Resource Intensity Level, PT No post-acute rehabilitation needs   AM-PAC Basic Mobility Inpatient   Turning in Flat Bed Without Bedrails 4   Lying on Back to Sitting on Edge of Flat Bed Without Bedrails 4   Moving Bed to Chair 4   Standing Up From Chair Using Arms 4   Walk in Room 4   Climb 3-5 Stairs With Railing 3   Basic Mobility Inpatient Raw Score 23   Basic Mobility Standardized Score 50.88   Highest Level Of Mobility   JH-HLM Goal 7: Walk 25 feet or more   JH-HLM Achieved 7: Walk 25 feet or more   End of Consult   Patient Position at End of Consult Supine;Bed/Chair alarm activated;All needs within reach     History: co - morbidities including age, use of assistive device prn, current experience including fall risk, airborne isolation  Exam: impairments in systems including multiple body structures involved; neuromuscular (balance, gait, transfers), AM-PAC  Clinical: stable/unpredictable  Complexity: moderate      Tamiko Easley, PT

## 2023-12-29 NOTE — PLAN OF CARE
Problem: Potential for Falls  Goal: Patient will remain free of falls  Description: INTERVENTIONS:  - Educate patient/family on patient safety including physical limitations  - Instruct patient to call for assistance with activity   - Consult OT/PT to assist with strengthening/mobility   - Keep Call bell within reach  - Keep bed low and locked with side rails adjusted as appropriate  - Keep care items and personal belongings within reach  - Initiate and maintain comfort rounds  - Make Fall Risk Sign visible to staff  - Offer Toileting every  Hours, in advance of need  - Initiate/Maintain alarm  - Obtain necessary fall risk management equipment:   - Apply yellow socks and bracelet for high fall risk patients  - Consider moving patient to room near nurses station  Outcome: Progressing     Problem: CARDIOVASCULAR - ADULT  Goal: Maintains optimal cardiac output and hemodynamic stability  Description: INTERVENTIONS:  - Monitor I/O, vital signs and rhythm  - Monitor for S/S and trends of decreased cardiac output  - Administer and titrate ordered vasoactive medications to optimize hemodynamic stability  - Assess quality of pulses, skin color and temperature  - Assess for signs of decreased coronary artery perfusion  - Instruct patient to report change in severity of symptoms  Outcome: Progressing  Goal: Absence of cardiac dysrhythmias or at baseline rhythm  Description: INTERVENTIONS:  - Continuous cardiac monitoring, vital signs, obtain 12 lead EKG if ordered  - Administer antiarrhythmic and heart rate control medications as ordered  - Monitor electrolytes and administer replacement therapy as ordered  Outcome: Progressing     Problem: RESPIRATORY - ADULT  Goal: Achieves optimal ventilation and oxygenation  Description: INTERVENTIONS:  - Assess for changes in respiratory status  - Assess for changes in mentation and behavior  - Position to facilitate oxygenation and minimize respiratory effort  - Oxygen administered by  appropriate delivery if ordered  - Initiate smoking cessation education as indicated  - Encourage broncho-pulmonary hygiene including cough, deep breathe, Incentive Spirometry  - Assess the need for suctioning and aspirate as needed  - Assess and instruct to report SOB or any respiratory difficulty  - Respiratory Therapy support as indicated  Outcome: Progressing     Problem: METABOLIC, FLUID AND ELECTROLYTES - ADULT  Goal: Electrolytes maintained within normal limits  Description: INTERVENTIONS:  - Monitor labs and assess patient for signs and symptoms of electrolyte imbalances  - Administer electrolyte replacement as ordered  - Monitor response to electrolyte replacements, including repeat lab results as appropriate  - Instruct patient on fluid and nutrition as appropriate  Outcome: Progressing  Goal: Fluid balance maintained  Description: INTERVENTIONS:  - Monitor labs   - Monitor I/O and WT  - Instruct patient on fluid and nutrition as appropriate  - Assess for signs & symptoms of volume excess or deficit  Outcome: Progressing  Goal: Glucose maintained within target range  Description: INTERVENTIONS:  - Monitor Blood Glucose as ordered  - Assess for signs and symptoms of hyperglycemia and hypoglycemia  - Administer ordered medications to maintain glucose within target range  - Assess nutritional intake and initiate nutrition service referral as needed  Outcome: Progressing     Problem: HEMATOLOGIC - ADULT  Goal: Maintains hematologic stability  Description: INTERVENTIONS  - Assess for signs and symptoms of bleeding or hemorrhage  - Monitor labs  - Administer supportive blood products/factors as ordered and appropriate  Outcome: Progressing

## 2023-12-29 NOTE — ASSESSMENT & PLAN NOTE
"Patient with PMHx of HTN, HLD, DM2, PE on eliquis presented to the ED after it was recommended by her family doctor for her hyponatremia on outpatient lab work  Pt was seen outpatient 12/27 by PCP secondary to symptoms of shortness of breath, generalized weakness, dry cough, chills, and anorexia.  with positive COVID test at home.   Pt PCP reported concern for community-acquired pneumonia given negative COVID test in the office at that time and pt initiated patient on Z-pack for which she has taken 2 doses  Patient currently maintained on RA  WBC 4.15, not fulfilling septic criteria  Chest CT today demonstrating \"No acute finding in the chest.\"  Viral panel positive for COVID-19 infection  Procalc 0.48, repeat in AM  UA negative   Lactic 1.6  , Trop 12, BNP 91  .3, trend    On Remdesevir    She is improving  If she remains off of oxygen, she can go home tomorrow.    "

## 2023-12-29 NOTE — H&P
"[facility]  H&P  Name: Jolene Segundo 83 y.o. female I MRN: [id number]  Unit/Bed#: AIDAN POOL I Date of Admission: 12/28/2023   Date of Service: 12/28/2023 I Hospital Day: 0      Assessment/Plan   * COVID-19  Assessment & Plan  Patient with PMHx of HTN, HLD, DM2, PE on eliquis presented to the ED after it was recommended by her family doctor for her hyponatremia on outpatient lab work  Pt was seen outpatient 12/27 by PCP secondary to symptoms of shortness of breath, generalized weakness, dry cough, chills, and anorexia.  with positive COVID test at home.   Pt PCP reported concern for community-acquired pneumonia given negative COVID test in the office at that time and pt initiated patient on Z-pack for which she has taken 2 doses  Patient currently maintained on RA  WBC 4.15, not fulfilling septic criteria  Chest CT today demonstrating \"No acute finding in the chest.\"  Viral panel positive for COVID-19 infection  Procalc 0.48, repeat in AM  UA negative   Lactic 1.6  , Trop 12, BNP 91  .3, trend  Respiratory protocol  Supportive care, gentle fluids  Initiated on remdesivir in the ED, continue  Will give dose ceftriaxone given elevated procalc      Hyponatremia  Assessment & Plan  POA Na 125  Likely in the setting of dehydration from low p.o. intake  Pt received 1L fluid bolus in the ED  Gentle IV fluids  Monitor BMP closely for gentle appropriate correction    KAROLINA (acute kidney injury) (HCC)  Assessment & Plan  POA creatinine 1.38  Baseline approximately 0.8  Likely secondary to low p.o. intake  Hold HCTZ  Gentle fluids  Daily weights, I/O  Urinary retention protocol  Monitor BMP for improvement    Generalized weakness  Assessment & Plan  Patient reports increased generalized weakness x several days  Pt ambulates without assistive devices typically, but has been using a cane  PT/OT eval     Hypokalemia  Assessment & Plan  POA K 2.8, repleted  Monitor on telemetry  Continue " "monitoring BMP    Thyroid nodule  Assessment & Plan  CT chest with \"incidental thyroid nodule(s) for which nonemergent thyroid ultrasound is recommended.\"  TSH 12/27 0.637  Outpatient follow-up    Acute pulmonary embolism without acute cor pulmonale (HCC)  Assessment & Plan  Anticoagulated on eliquis    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 Sequential Compression Devices Ordered.  Code Status: Level 3 - DNAR and DNI   Discussion with family:  Update in AM.     Anticipated Length of Stay: Patient will be admitted on an inpatient basis with an anticipated length of stay of greater than 2 midnights secondary to COVID, hyponatremia.    Total Time Spent on Date of Encounter in care of patient: 65 minutes This time was spent on one or more of the following: performing physical exam; counseling and coordination of care; obtaining or reviewing history; documenting in the medical record; reviewing/ordering tests, medications or procedures; communicating with other healthcare professionals and discussing with patient's family/caregivers.    Chief Complaint: \"I have been feeling very weak\"    History of Present Illness:  Jolene Segundo is a 83 y.o. female who presents with COVID and hyponatremia.  Patient reports that for the last two days she has had increased generalized weakness, anorexia, fatigue, dry cough, shortness of breath, nausea, and chills.  She reports that she went to see her PCP yesterday secondary to her  being diagnosed with COVID prior to Christmas and suspected that she had likely also " caught this.  Patient's COVID test in the office yesterday was negative so PCP thought patient likely had community-acquired pneumonia and patient was initiated on a Z-Shubham.  Patient went for outpatient chest x-ray and labs.  Patient reports that she received a call today that her sodium was low and it was recommended that she come to the emergency department for evaluation.  Patient reports that she normally does not use assistive devices to ambulate however she has been requiring a cane lately due to her weakness.  She denies any other symptoms such as chest pain, palpitations, abdominal pain, vomiting, headache, fever, pedal edema, neuro or urinary symptoms.    Review of Systems:  Review of Systems   Constitutional:  Positive for appetite change (anorexia), chills, diaphoresis and fatigue. Negative for fever.   HENT:  Positive for congestion (improved). Negative for rhinorrhea and sore throat.    Eyes:  Negative for photophobia and visual disturbance.   Respiratory:  Positive for cough (dry cough) and shortness of breath. Negative for wheezing.    Cardiovascular:  Negative for chest pain, palpitations and leg swelling.   Gastrointestinal:  Positive for nausea. Negative for abdominal distention, abdominal pain, blood in stool, constipation, diarrhea and vomiting.   Genitourinary:  Negative for dysuria and hematuria.   Musculoskeletal:  Negative for arthralgias, back pain, myalgias and neck stiffness.   Skin:  Negative for color change and rash.   Neurological:  Positive for weakness (generalized). Negative for dizziness, tremors, seizures, syncope, facial asymmetry, speech difficulty, light-headedness, numbness and headaches.   Psychiatric/Behavioral:  Negative for agitation, behavioral problems and confusion. The patient is not nervous/anxious.    All other systems reviewed and are negative.      Past Medical and Surgical History:   Past Medical History:   Diagnosis Date    Diabetes mellitus (HCC)     Hemorrhoids      Hyperlipidemia     Hypertension     ovarian     Pseudopolyposis of colon without complication, unspecified part of colon (HCC) 07/01/2022       Past Surgical History:   Procedure Laterality Date    BREAST BIOPSY      Bx breast percutan needle core use image guide (stereotactic)    CATARACT EXTRACTION      COLONOSCOPY      HEMORROIDECTOMY      HI LIGATION/BIOPSY TEMPORAL ARTERY Bilateral 10/17/2022    Procedure: BIOPSY ARTERY TEMPORAL;  Surgeon: Mary Eldridge DO;  Location: [facility];  Service: Vascular    TOTAL ABDOMINAL HYSTERECTOMY         Meds/Allergies:  Prior to Admission medications    Medication Sig Start Date End Date Taking? Authorizing Provider   ALPRAZolam (XANAX) 0.25 mg tablet Take 1 tablet (0.25 mg total) by mouth daily as needed for anxiety 10/27/22   Raman Monson MD   ascorbic acid (VITAMIN C) 250 mg tablet Take 250 mg by mouth daily    Historical Provider, MD   azithromycin (ZITHROMAX) 250 mg tablet Take 2 tablets on day 1, then 1 tablet daily days 2 through 5 12/27/23 1/1/24  Raman Monson MD   Cholecalciferol (VITAMIN D) 2000 units CAPS Take 1 capsule by mouth 2 (two) times a day 6/5/15   Historical Provider, MD   Eliquis 5 MG TAKE 1 TABLET BY MOUTH TWICE A DAY 7/20/23   Aisha Martinez PA-C   hydrochlorothiazide (HYDRODIURIL) 25 mg tablet TAKE 1 TABLET (25 MG TOTAL) BY MOUTH DAILY. 2/6/23   Aisha Martinez PA-C   latanoprost (XALATAN) 0.005 % ophthalmic solution  12/7/22   Historical Provider, MD   metoprolol tartrate (LOPRESSOR) 50 mg tablet TAKE 1 TABLET BY MOUTH EVERY 12 HOURS 12/5/23   Aisha Martinez PA-C   nystatin (MYCOSTATIN) cream Apply topically 2 (two) times a day 12/18/23   Raman Monson MD   pravastatin (PRAVACHOL) 10 mg tablet TAKE 1 TABLET BY MOUTH EVERYDAY AT BEDTIME 11/7/23   Aisha Martinez PA-C   sulfamethoxazole-trimethoprim (Bactrim DS) 800-160 mg per tablet Take 1 tablet by mouth every 12 (twelve) hours for 10 days 12/18/23  12/28/23  Raman Monson MD     I have reviewed home medications with patient personally.    Allergies:   Allergies   Allergen Reactions    Iodinated Contrast Media Hives       Social History:  Marital Status: /Civil Union   Patient Pre-hospital Living Situation: Home  Patient Pre-hospital Level of Mobility: walks with cane  Patient Pre-hospital Diet Restrictions: Diabetic  Substance Use History:   Social History     Substance and Sexual Activity   Alcohol Use Not Currently    Comment: rarely     Social History     Tobacco Use   Smoking Status Never   Smokeless Tobacco Never     Social History     Substance and Sexual Activity   Drug Use Never       Family History:  Family History   Problem Relation Age of Onset    Heart disease Mother     Lung cancer Mother     Uterine cancer Mother     Heart disease Father     Breast cancer Sister     Diabetes Maternal Grandmother        Physical Exam:     Vitals:   Blood Pressure: 139/63 (12/28/23 2230)  Pulse: (!) 112 (12/28/23 2230)  Temperature: 97.5 °F (36.4 °C) (12/28/23 1718)  Temp Source: Oral (12/28/23 1718)  Respirations: 20 (12/28/23 2230)  SpO2: 99 % (12/28/23 2230)    Physical Exam  Vitals and nursing note reviewed.   Constitutional:       General: She is not in acute distress.     Appearance: She is well-developed.   HENT:      Head: Normocephalic and atraumatic.      Nose: Nose normal. No congestion.      Mouth/Throat:      Mouth: Mucous membranes are dry.      Pharynx: Oropharynx is clear.   Eyes:      Conjunctiva/sclera: Conjunctivae normal.   Cardiovascular:      Rate and Rhythm: Regular rhythm. Tachycardia present.      Heart sounds: Murmur heard.      No friction rub. No gallop.   Pulmonary:      Effort: Pulmonary effort is normal. No respiratory distress.      Breath sounds: Normal breath sounds. No wheezing, rhonchi or rales.   Abdominal:      General: Bowel sounds are normal. There is no distension.      Palpations: Abdomen is soft.       Tenderness: There is no abdominal tenderness.   Musculoskeletal:      Cervical back: Neck supple.      Right lower leg: No edema.      Left lower leg: No edema.   Skin:     General: Skin is warm and dry.      Capillary Refill: Capillary refill takes less than 2 seconds.   Neurological:      General: No focal deficit present.      Mental Status: She is alert and oriented to person, place, and time. Mental status is at baseline.   Psychiatric:         Mood and Affect: Mood normal.         Behavior: Behavior normal.          Additional Data:     Lab Results:  Results from last 7 days   Lab Units 12/28/23  1849   WBC Thousand/uL 4.15*   HEMOGLOBIN g/dL 12.9   HEMATOCRIT % 37.9   PLATELETS Thousands/uL 160   NEUTROS PCT % 74   LYMPHS PCT % 12*   MONOS PCT % 11   EOS PCT % 1     Results from last 7 days   Lab Units 12/28/23  1849   SODIUM mmol/L 125*   POTASSIUM mmol/L 2.8*   CHLORIDE mmol/L 86*   CO2 mmol/L 29   BUN mg/dL 30*   CREATININE mg/dL 1.38*   ANION GAP mmol/L 10   CALCIUM mg/dL 9.8   ALBUMIN g/dL 3.7   TOTAL BILIRUBIN mg/dL 0.95   ALK PHOS U/L 45   ALT U/L 33   AST U/L 39   GLUCOSE RANDOM mg/dL 100     Results from last 7 days   Lab Units 12/28/23  1849   INR  1.81*             Results from last 7 days   Lab Units 12/28/23  1849   LACTIC ACID mmol/L 1.6   PROCALCITONIN ng/ml 0.48*       Lines/Drains:  Invasive Devices       Peripheral Intravenous Line  Duration             Peripheral IV 12/28/23 Left Antecubital <1 day    Peripheral IV 12/28/23 Right;Ventral (anterior) Forearm <1 day                        Imaging: Reviewed radiology reports from this admission including: chest CT scan  CT chest without contrast   Final Result by Ld Baez MD (12/28 2038)      No acute finding in the chest.      Incidental thyroid nodule(s) for which nonemergent thyroid ultrasound is recommended.      The study was marked in EPIC for immediate notification.         Workstation performed: [id number]              EKG and Other Studies Reviewed on Admission:   EKG: No EKG obtained.    ** Please Note: This note has been constructed using a voice recognition system. **

## 2023-12-29 NOTE — ASSESSMENT & PLAN NOTE
POA creatinine 1.38  Baseline approximately 0.8  Likely secondary to low p.o. intake  Hold HCTZ  Gentle fluids  Daily weights, I/O  Urinary retention protocol  Monitor BMP for improvement

## 2023-12-29 NOTE — ASSESSMENT & PLAN NOTE
"CT chest with \"incidental thyroid nodule(s) for which nonemergent thyroid ultrasound is recommended.\"  TSH 12/27 0.637  Outpatient follow-up  "

## 2023-12-29 NOTE — SEPSIS NOTE
Sepsis Note   Jolene Segundo 83 y.o. female MRN: [id number]  Unit/Bed#: ED-24 Encounter: [id number]       Initial Sepsis Screening       Row Name 12/28/23 0258                Is the patient's history suggestive of a new or worsening infection? Yes (Proceed)  -NB        Suspected source of infection suspect infection, source unknown  -NB        Indicate SIRS criteria --        Are two or more of the above signs & symptoms of infection both present and new to the patient? No  -NB                  User Key  (r) = Recorded By, (t) = Taken By, (c) = Cosigned By      Initials Name Provider Type    NB Mylene Butler DO Physician                        There is no height or weight on file to calculate BMI.  Wt Readings from Last 1 Encounters:   12/27/23 75.8 kg (167 lb)     IBW (Ideal Body Weight): 50.1 kg    Ideal body weight: 50.1 kg (110 lb 7.2 oz)  Adjusted ideal body weight: 60.4 kg (133 lb 1.1 oz)

## 2023-12-29 NOTE — OCCUPATIONAL THERAPY NOTE
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)    Hypokalemia    Acute pulmonary embolism without acute cor pulmonale (HCC)    Hyponatremia    KAROLINA (acute kidney injury) (HCC)    Thyroid nodule    Past Medical History  Past Medical History:   Diagnosis Date    Diabetes mellitus (HCC)     Hemorrhoids     Hyperlipidemia     Hypertension     ovarian     Pseudopolyposis of colon without complication, unspecified part of colon (HCC) 07/01/2022     Past Surgical History  Past Surgical History:   Procedure Laterality Date    BREAST BIOPSY      Bx breast percutan needle core use image guide (stereotactic)    CATARACT EXTRACTION      COLONOSCOPY      HEMORROIDECTOMY      PA LIGATION/BIOPSY TEMPORAL ARTERY Bilateral 10/17/2022    Procedure: BIOPSY ARTERY TEMPORAL;  Surgeon: Mary Eldridge DO;  Location: [facility];  Service: Vascular    TOTAL ABDOMINAL HYSTERECTOMY        12/29/23 0842   OT Last Visit   OT Visit Date 12/29/23   Note Type   Note type Evaluation   Additional Comments greeted in supine and agreeable to skilled OT evaluation.   Pain Assessment   Pain Assessment Tool 0-10   Pain Score No Pain   Restrictions/Precautions   Weight Bearing Precautions Per Order No   Other Precautions Contact/isolation;Airborne/isolation;Fall Risk   Home Living   Type of Home House   Home Layout One level;Laundry in basement  (3 DEANGELO)   Bathroom Shower/Tub Tub/shower unit   Bathroom Toilet Standard   Bathroom Equipment Commode;Grab bars in shower;Tub transfer bench;Grab bars around toilet   Bathroom Accessibility Accessible   Prior Function   Level of [place] Independent with ADLs;Independent with functional mobility;Independent with IADLS   Lives With Spouse   Receives Help From Family    IADLs Independent with driving;Independent with meal prep;Independent with medication management   Falls in the last 6 months 0   Vocational Retired   Comments Prior to admission, pt lives with  in a 1 level 3 DEANGELO. Basement level laundry. Tub  shower with bench and grab bars. Standard commode and grab bras. I with ADLS, IADLs and mobility with cane. Owns RW. 0 falls.   ADL   Where Assessed Edge of bed   Eating Assistance 6  Modified independent   Grooming Assistance 6  Modified Independent   UB Bathing Assistance 6  Modified Independent   LB Bathing Assistance 6  Modified Independent   UB Dressing Assistance 6  Modified independent   LB Dressing Assistance 6  Modified independent   Toileting Assistance  6  Modified independent   Bed Mobility   Supine to Sit 6  Modified independent   Sit to Supine 6  Modified independent   Transfers   Sit to Stand 6  Modified independent   Stand to Sit 6  Modified independent   Functional Mobility   Functional Mobility 6  Modified independent   Additional Comments Cane, functional distances.   Additional items SPC   Balance   Static Sitting Normal   Dynamic Sitting Good   Static Standing Fair +   Dynamic Standing Fair   Ambulatory Fair   Activity Tolerance   Activity Tolerance Patient tolerated treatment well   Medical Staff Made Aware PT andry   Nurse Made Aware RN   RUE Assessment   RUE Assessment WFL   LUE Assessment   LUE Assessment WFL   Hand Function   Gross Motor Coordination Functional   Fine Motor Coordination Functional   Psychosocial   Psychosocial (WDL) WDL   Cognition   Overall Cognitive Status WFL   Arousal/Participation Cooperative   Attention Within functional limits   Orientation Level Oriented X4   Memory Within functional limits   Following Commands Follows all commands and directions without difficulty   Comments pleasant and cooperative.   Assessment   Assessment Jolene Segundo is a 83 y.o. female seen for OT evaluation s/p admit to [facility] on 12/28/2023  w/ COVID-19.  Comorbidities affecting pt's functional performance at time of assessment include:  HTN, HLD, DM2, PE on eliquis . Pt with active OT orders and activity orders for Up and OOB as tolerated. Personal factors affecting pt at time of IE include:DEANGELO home environment. Prior to admission, pt lives with  in a 1 level 3 DEANGELO. Basement level laundry. Tub  shower with bench and grab bars. Standard commode and grab bras. I with ADLS, IADLs and mobility with cane. Owns RW. 0 falls. Upon evaluation: Pt currently requires Alex for UB ADLs, Alex for LB ADLs, Alex for toileting, alex for bed mobility, Alex for functional transfers, and Alex mobility 2* the following deficits impacting occupational performance:weakness. Pt is currently at their functional baseline and no skilled OT is warranted at this time. From OT standpoint, recommendation at time of d/c would be no rehab needs.   Goals   Patient Goals to go home.   Plan   OT Frequency Eval only  (DC OT maintain on restroative.)   Discharge Recommendation   Rehab Resource Intensity Level, OT No post-acute rehabilitation needs   Additional Comments  The patient's raw score on the AM-PAC Daily Activity Inpatient Short Form is 23. A raw score of greater than or equal to 19 suggests the patient may benefit from discharge to home. Please refer to the recommendation of the Occupational Therapist for safe discharge planning.   AM-PAC Daily Activity Inpatient   Lower Body Dressing 3   Bathing 4   Toileting 4   Upper Body Dressing 4   Grooming 4   Eating 4   Daily Activity Raw Score 23   Daily Activity Standardized Score (Calc for Raw Score >=11) 51.12   AM-PAC Applied Cognition Inpatient   Following a Speech/Presentation 4   Understanding Ordinary Conversation 4   Taking Medications 4   Remembering Where Things Are Placed or Put Away 4   Remembering List of 4-5 Errands 4   Taking Care of Complicated Tasks 4   Applied Cognition Raw Score 24   Applied Cognition  Standardized Score 62.21   Ning Don, OT

## 2023-12-29 NOTE — ASSESSMENT & PLAN NOTE
"Patient with PMHx of HTN, HLD, DM2, PE on eliquis presented to the ED after it was recommended by her family doctor for her hyponatremia on outpatient lab work  Pt was seen outpatient 12/27 by PCP secondary to symptoms of shortness of breath, generalized weakness, dry cough, chills, and anorexia.  with positive COVID test at home.   Pt PCP reported concern for community-acquired pneumonia given negative COVID test in the office at that time and pt initiated patient on Z-pack for which she has taken 2 doses  Patient currently maintained on RA  WBC 4.15, not fulfilling septic criteria  Chest CT today demonstrating \"No acute finding in the chest.\"  Viral panel positive for COVID-19 infection  Procalc 0.48, repeat in AM  UA negative   Lactic 1.6  , Trop 12, BNP 91  .3, trend  Respiratory protocol  Supportive care, gentle fluids  Initiated on remdesivir in the ED, continue  Will give dose ceftriaxone given elevated procalc    "

## 2023-12-29 NOTE — ASSESSMENT & PLAN NOTE
POA Na 125  Likely in the setting of dehydration from low p.o. intake  Pt received 1L fluid bolus in the ED  Gentle IV fluids  Monitor BMP closely for gentle appropriate correction

## 2023-12-29 NOTE — PROGRESS NOTES
"[facility]  Progress Note  Name: Jolene Segundo I  MRN: [id number]  Unit/Bed#: E4 -01 I Date of Admission: 2023   Date of Service: 2023 I Hospital Day: 1    Assessment/Plan   * COVID-19  Assessment & Plan  Patient with PMHx of HTN, HLD, DM2, PE on eliquis presented to the ED after it was recommended by her family doctor for her hyponatremia on outpatient lab work  Pt was seen outpatient  by PCP secondary to symptoms of shortness of breath, generalized weakness, dry cough, chills, and anorexia.  with positive COVID test at home.   Pt PCP reported concern for community-acquired pneumonia given negative COVID test in the office at that time and pt initiated patient on Z-pack for which she has taken 2 doses  Patient currently maintained on RA  WBC 4.15, not fulfilling septic criteria  Chest CT today demonstrating \"No acute finding in the chest.\"  Viral panel positive for COVID-19 infection  Procalc 0.48, repeat in AM  UA negative   Lactic 1.6  , Trop 12, BNP 91  .3, trend    On Remdesevir    She is improving  If she remains off of oxygen, she can go home tomorrow.                 Subjective:   Feels better  Less sob  No cough  Less weakness.      Objective:     Vitals:   Temp (24hrs), Av.8 °F (36.6 °C), Min:97 °F (36.1 °C), Max:99.1 °F (37.3 °C)    Temp:  [97 °F (36.1 °C)-99.1 °F (37.3 °C)] 99.1 °F (37.3 °C)  HR:  [] 108  Resp:  [18-20] 18  BP: (120-158)/(62-75) 135/64  SpO2:  [94 %-100 %] 95 %  Body mass index is 30.36 kg/m².     Input and Output Summary (last 24 hours):       Intake/Output Summary (Last 24 hours) at 2023 1612  Last data filed at 2023 2158  Gross per 24 hour   Intake 1440 ml   Output --   Net 1440 ml       Physical Exam:     Physical Exam  Vitals and nursing note reviewed.   HENT:      Head: Normocephalic and atraumatic.   Eyes:      Pupils: Pupils are equal, round, and reactive to light.   Cardiovascular:      " Rate and Rhythm: Normal rate and regular rhythm.      Heart sounds: No murmur heard.     No friction rub. No gallop.   Pulmonary:      Effort: Pulmonary effort is normal.      Breath sounds: Normal breath sounds. No wheezing or rales.   Abdominal:      General: Bowel sounds are normal.      Palpations: Abdomen is soft.      Tenderness: There is no abdominal tenderness.   Musculoskeletal:      Right lower leg: No edema.      Left lower leg: No edema.       .       Additional Data:     Labs:    Results from last 7 days   Lab Units 12/29/23  0504 12/28/23  1849   WBC Thousand/uL 2.83* 4.15*   HEMOGLOBIN g/dL 11.1* 12.9   HEMATOCRIT % 31.3* 37.9   PLATELETS Thousands/uL 157 160   NEUTROS PCT %  --  74   LYMPHS PCT %  --  12*   LYMPHO PCT % 22  --    MONOS PCT %  --  11   MONO PCT % 9  --    EOS PCT % 0 1     Results from last 7 days   Lab Units 12/29/23  0947 12/29/23  0504   POTASSIUM mmol/L 3.0* 3.0*   CHLORIDE mmol/L 92* 93*   CO2 mmol/L 30 28   BUN mg/dL 19 21   CREATININE mg/dL 0.84 0.83   CALCIUM mg/dL 9.6 9.1   ALK PHOS U/L  --  41   ALT U/L  --  30   AST U/L  --  34     Results from last 7 days   Lab Units 12/28/23  1849   INR  1.81*                   * I Have Reviewed All Lab Data     Recent Cultures (last 7 days):     Results from last 7 days   Lab Units 12/28/23  1945   LEGIONELLA URINARY ANTIGEN  Negative         Last 24 Hours Medication List:   Current Facility-Administered Medications   Medication Dose Route Frequency Provider Last Rate    acetaminophen  650 mg Oral Q6H PRN Lenin Hernandez PA-C      aluminum-magnesium hydroxide-simethicone  30 mL Oral Q6H PRN Lenin Hernandez PA-C      apixaban  5 mg Oral BID Lenin Hernandez PA-C      cefTRIAXone  1,000 mg Intravenous Q24H Lenin Hernandez PA-C 1,000 mg (12/29/23 0028)    metoprolol tartrate  50 mg Oral Q12H [facility] Lenin Hernandez PA-C      ondansetron  4 mg Intravenous Q6H PRN Lenin Hernandez PA-C      potassium chloride  20 mEq Intravenous Q2H  Zachary Davis DO 20 mEq (12/29/23 1416)    pravastatin  10 mg Oral HS Lenin Hernandez PA-C      remdesivir  100 mg Intravenous Q24H Lenin Hernandez PA-C           VTE Pharmacologic Prophylaxis:   Pharmacologic: Apixaban (Eliquis)      Current Length of Stay: 1 day(s)    Current Patient Status: Inpatient       Discharge Plan: home tomorrow    Code Status: Level 3 - DNAR and DNI           Today, Patient Was Seen By: Zachary Davis DO    ** Please Note: Dictation voice to text software may have been used in the creation of this document. **

## 2023-12-29 NOTE — ASSESSMENT & PLAN NOTE
Patient reports increased generalized weakness x several days  Pt ambulates without assistive devices typically, but has been using a cane  PT/OT eval

## 2023-12-30 VITALS
SYSTOLIC BLOOD PRESSURE: 102 MMHG | DIASTOLIC BLOOD PRESSURE: 62 MMHG | RESPIRATION RATE: 18 BRPM | OXYGEN SATURATION: 96 % | HEIGHT: 62 IN | HEART RATE: 98 BPM | BODY MASS INDEX: 30.79 KG/M2 | WEIGHT: 167.33 LBS | TEMPERATURE: 98.7 F

## 2023-12-30 LAB
ANION GAP SERPL CALCULATED.3IONS-SCNC: 5 MMOL/L
BUN SERPL-MCNC: 16 MG/DL (ref 5–25)
CALCIUM SERPL-MCNC: 9.3 MG/DL (ref 8.4–10.2)
CHLORIDE SERPL-SCNC: 96 MMOL/L (ref 96–108)
CO2 SERPL-SCNC: 32 MMOL/L (ref 21–32)
CREAT SERPL-MCNC: 0.7 MG/DL (ref 0.6–1.3)
CRP SERPL QL: 79.7 MG/L
ERYTHROCYTE [DISTWIDTH] IN BLOOD BY AUTOMATED COUNT: 13.4 % (ref 11.6–15.1)
FERRITIN SERPL-MCNC: 1392 NG/ML (ref 11–307)
GFR SERPL CREATININE-BSD FRML MDRD: 80 ML/MIN/1.73SQ M
GLUCOSE SERPL-MCNC: 88 MG/DL (ref 65–140)
HCT VFR BLD AUTO: 32.2 % (ref 34.8–46.1)
HGB BLD-MCNC: 11.1 G/DL (ref 11.5–15.4)
MCH RBC QN AUTO: 29 PG (ref 26.8–34.3)
MCHC RBC AUTO-ENTMCNC: 34.5 G/DL (ref 31.4–37.4)
MCV RBC AUTO: 84 FL (ref 82–98)
NRBC BLD AUTO-RTO: 0 /100 WBCS
PLATELET # BLD AUTO: 168 THOUSANDS/UL (ref 149–390)
PMV BLD AUTO: 11.3 FL (ref 8.9–12.7)
POTASSIUM SERPL-SCNC: 3.3 MMOL/L (ref 3.5–5.3)
RBC # BLD AUTO: 3.83 MILLION/UL (ref 3.81–5.12)
SODIUM SERPL-SCNC: 133 MMOL/L (ref 135–147)
WBC # BLD AUTO: 4.1 THOUSAND/UL (ref 4.31–10.16)

## 2023-12-30 PROCEDURE — 80048 BASIC METABOLIC PNL TOTAL CA: CPT | Performed by: HOSPITALIST

## 2023-12-30 PROCEDURE — 99239 HOSP IP/OBS DSCHRG MGMT >30: CPT | Performed by: HOSPITALIST

## 2023-12-30 PROCEDURE — 85027 COMPLETE CBC AUTOMATED: CPT | Performed by: HOSPITALIST

## 2023-12-30 PROCEDURE — 82728 ASSAY OF FERRITIN: CPT | Performed by: HOSPITALIST

## 2023-12-30 PROCEDURE — 86140 C-REACTIVE PROTEIN: CPT | Performed by: HOSPITALIST

## 2023-12-30 RX ORDER — POTASSIUM CHLORIDE 20 MEQ/1
40 TABLET, EXTENDED RELEASE ORAL ONCE
Status: COMPLETED | OUTPATIENT
Start: 2023-12-30 | End: 2023-12-30

## 2023-12-30 RX ADMIN — POTASSIUM CHLORIDE 40 MEQ: 1500 TABLET, EXTENDED RELEASE ORAL at 12:44

## 2023-12-30 RX ADMIN — APIXABAN 5 MG: 5 TABLET, FILM COATED ORAL at 08:36

## 2023-12-30 RX ADMIN — METOPROLOL TARTRATE 50 MG: 50 TABLET, FILM COATED ORAL at 08:36

## 2023-12-30 NOTE — ASSESSMENT & PLAN NOTE
Not needing oxygen.  Okay to stop remdesivir    She feels almost back to her normal self, will get her home today

## 2023-12-30 NOTE — PLAN OF CARE
Problem: Potential for Falls  Goal: Patient will remain free of falls  Description: INTERVENTIONS:  - Educate patient/family on patient safety including physical limitations  - Instruct patient to call for assistance with activity   - Consult OT/PT to assist with strengthening/mobility   - Keep Call bell within reach  - Keep bed low and locked with side rails adjusted as appropriate  - Keep care items and personal belongings within reach  - Initiate and maintain comfort rounds  - Make Fall Risk Sign visible to staff  - Offer Toileting every 2 Hours, in advance of need  - Initiate/Maintain bed alarm  - Obtain necessary fall risk management equipment: call bell  Problem: CARDIOVASCULAR - ADULT  Goal: Maintains optimal cardiac output and hemodynamic stability  Description: INTERVENTIONS:  - Monitor I/O, vital signs and rhythm  - Monitor for S/S and trends of decreased cardiac output  - Administer and titrate ordered vasoactive medications to optimize hemodynamic stability  - Assess quality of pulses, skin color and temperature  - Assess for signs of decreased coronary artery perfusion  - Instruct patient to report change in severity of symptoms  Outcome: Progressing  Goal: Absence of cardiac dysrhythmias or at baseline rhythm  Description: INTERVENTIONS:  - Continuous cardiac monitoring, vital signs, obtain 12 lead EKG if ordered  - Administer antiarrhythmic and heart rate control medications as ordered  - Monitor electrolytes and administer replacement therapy as ordered  Outcome: Progressing     Problem: RESPIRATORY - ADULT  Goal: Achieves optimal ventilation and oxygenation  Description: INTERVENTIONS:  - Assess for changes in respiratory status  - Assess for changes in mentation and behavior  - Position to facilitate oxygenation and minimize respiratory effort  - Oxygen administered by appropriate delivery if ordered  - Initiate smoking cessation education as indicated  - Encourage broncho-pulmonary hygiene  including cough, deep breathe, Incentive Spirometry  - Assess the need for suctioning and aspirate as needed  - Assess and instruct to report SOB or any respiratory difficulty  - Respiratory Therapy support as indicated  Outcome: Progressing     Problem: METABOLIC, FLUID AND ELECTROLYTES - ADULT  Goal: Electrolytes maintained within normal limits  Description: INTERVENTIONS:  - Monitor labs and assess patient for signs and symptoms of electrolyte imbalances  - Administer electrolyte replacement as ordered  - Monitor response to electrolyte replacements, including repeat lab results as appropriate  - Instruct patient on fluid and nutrition as appropriate  Outcome: Progressing  Goal: Fluid balance maintained  Description: INTERVENTIONS:  - Monitor labs   - Monitor I/O and WT  - Instruct patient on fluid and nutrition as appropriate  - Assess for signs & symptoms of volume excess or deficit  Outcome: Progressing  Goal: Glucose maintained within target range  Description: INTERVENTIONS:  - Monitor Blood Glucose as ordered  - Assess for signs and symptoms of hyperglycemia and hypoglycemia  - Administer ordered medications to maintain glucose within target range  - Assess nutritional intake and initiate nutrition service referral as needed  Outcome: Progressing     Problem: HEMATOLOGIC - ADULT  Goal: Maintains hematologic stability  Description: INTERVENTIONS  - Assess for signs and symptoms of bleeding or hemorrhage  - Monitor labs  - Administer supportive blood products/factors as ordered and appropriate  Outcome: Progressing     - Apply yellow socks and bracelet for high fall risk patients  - Consider moving patient to room near nurses station  Outcome: Progressing

## 2023-12-30 NOTE — DISCHARGE SUMMARY
"[facility]  Discharge- Jolene Segundo 1940, 83 y.o. female MRN: [id number]  Unit/Bed#: E4 -01 Encounter: [id number]  Primary Care Provider: Aisha Martinez PA-C   Date and time admitted to hospital: 12/28/2023  6:05 PM    * COVID-19  Assessment & Plan  Not needing oxygen.  Okay to stop remdesivir    She feels almost back to her normal self, will get her home today    Thyroid nodule  Assessment & Plan  CT chest with \"incidental thyroid nodule(s) for which nonemergent thyroid ultrasound is recommended.\"  TSH 12/27 0.637  Outpatient follow-up    Acute pulmonary embolism without acute cor pulmonale (HCC)  Assessment & Plan  Anticoagulated on eliquis        Discharging Physician / Practitioner: Zachary Davis DO  PCP: Aisha Martinez PA-C  Admission Date:   Admission Orders (From admission, onward)       Ordered        12/28/23 2040  INPATIENT ADMISSION  Once                          Discharge Date: 12/30/23    Medical Problems       Resolved Problems  Date Reviewed: 12/28/2023   None             Reason for Admission: Generalized weakness      Hospital Course:     Jolene Segundo is a 83 y.o. female patient who originally presented to the hospital on 12/28/2023 due to generalized weakness.  She is found to have COVID.  We started on COVID treatment, she however quickly weaned off of oxygen.  She does not need any further COVID treatment.  She feels back to her normal self.  Okay to get her home.       Incidental thyroid nodule.  This was seen on workup for COVID when she got a CT scan.  Recommend her PCP get a nonurgent thyroid ultrasound to further evaluate this nodule.    Please see above list of diagnoses and related plan for additional information.       Condition at Discharge:  stable        Discharge Day Visit / Exam:     Subjective:  feels well  Wants to go home.  No weakness  No SOB      Vitals: Blood Pressure: 137/70 (12/30/23 1100)  Pulse: 98 (12/30/23 " "1100)  Temperature: 98.1 °F (36.7 °C) (12/30/23 0816)  Temp Source: Temporal (12/30/23 0816)  Respirations: 18 (12/30/23 1100)  Height: 5' 2\" (157.5 cm) (12/29/23 0456)  Weight - Scale: 75.9 kg (167 lb 5.3 oz) (12/30/23 0549)  SpO2: 96 % (12/30/23 0816)    Exam:     Physical Exam  Vitals and nursing note reviewed.   HENT:      Head: Normocephalic and atraumatic.   Eyes:      Pupils: Pupils are equal, round, and reactive to light.   Cardiovascular:      Rate and Rhythm: Normal rate and regular rhythm.      Heart sounds: No murmur heard.     No friction rub. No gallop.   Pulmonary:      Effort: Pulmonary effort is normal.      Breath sounds: Normal breath sounds. No wheezing or rales.   Abdominal:      General: Bowel sounds are normal.      Palpations: Abdomen is soft.      Tenderness: There is no abdominal tenderness.   Musculoskeletal:      Right lower leg: No edema.      Left lower leg: No edema.       .         Discharge instructions/Information to patient and family:   See after visit summary for information provided to patient and family.      Provisions for Follow-Up Care:  See after visit summary for information related to follow-up care and any pertinent home health orders.      Disposition:     Home       Discharge Statement:  I spent 36 minutes discharging the patient. This time was spent on the day of discharge. I had direct contact with the patient on the day of discharge. Greater than 50% of the total time was spent examining patient, answering all patient questions, arranging and discussing plan of care with patient as well as directly providing post-discharge instructions.  Additional time then spent on discharge activities.    Discharge Medications:  See after visit summary for reconciled discharge medications provided to patient and family.      ** Please Note: This note has been constructed using a voice recognition system **                "

## 2024-01-01 ENCOUNTER — TELEPHONE (OUTPATIENT)
Dept: FAMILY MEDICINE CLINIC | Facility: CLINIC | Age: 84
End: 2024-01-01

## 2024-01-01 DIAGNOSIS — I10 ESSENTIAL HYPERTENSION: ICD-10-CM

## 2024-01-01 DIAGNOSIS — E04.1 THYROID NODULE: Primary | ICD-10-CM

## 2024-01-01 DIAGNOSIS — Z01.818 PREOP TESTING: ICD-10-CM

## 2024-01-01 DIAGNOSIS — Z01.818 PREOP GENERAL PHYSICAL EXAM: ICD-10-CM

## 2024-01-01 NOTE — TELEPHONE ENCOUNTER
Please call the pt and let her know I have ordered the thyroid US to further assess the thyroid nodule found on Ct imaging of her chest during admission. Thank you.         ----- Message from Zachary Davis DO sent at 12/30/2023  1:31 PM EST -----  Thank you for allowing us to participate in the care of your patient, Jolene Segundo, who was hospitalized from 12/28/2023 through 12/30/2023 with the admitting diagnosis of COVID.  Likely did not need any oxygen so she did not need to stay in the hospital more than overnight.  Of note she does need a follow-up thyroid ultrasound if you can order that.  This was an incidental finding on CAT scan.  .      If you have any additional questions or would like to discuss further, please feel free to contact me.    Zachary Davis DO  [facility], Hospitalist  [phone]

## 2024-01-02 NOTE — UTILIZATION REVIEW
Notification of Unplanned, Urgent, or   Emergency Inpatient Admission   AUTHORIZATION REQUEST   [place] [address]  Tax ID: [id number]  NPI: [id number]  Place of Service: Acute [facility] Hospital  Admission Level of Care: Inpatient  Place of Service Code: 21     Attending Physician Information  Attending Name and NPI#: Zachary Davis Do [[id number]]  Phone: [phone]     Admission Information  Inpatient Admission Date/Time: 12/28/23  8:40 PM  Discharge Date/Time: 12/30/2023  4:37 PM  Admitting Diagnosis Code/Description:  Hypokalemia [E87.6]  Hypomagnesemia [E83.42]  Hyponatremia [E87.1]  Weakness [R53.1]  SOB (shortness of breath) [R06.02]  Abnormal laboratory test result [R89.9]  Lab test positive for detection of COVID-19 virus [U07.1]     Utilization Review Contact  Briana Rivera, Utilization   Phone: [phone]  Fax: [phone]  Email: [email or web address]  Contact for approvals/pending authorizations, clinical reviews, and discharge.     Physician Advisory Services Contact  Medical Necessity Denial & Isdq-ng-Ifbz Discussion  Phone: [phone]  Fax: [phone]  Email: [email or web address]     DISCHARGE SUPPORT TEAM:  For Patients Discharge Needs & Updates  Phone: [phone] opt. 2 Fax: [phone]  Email: [email or web address]      Zachary Davis DO  Physician  Hospitalist     Discharge Summary      Signed     Date of Service: 12/30/2023  1:42 PM     Signed         [facility]  Discharge- Jolene Ratna 1940, 83 y.o. female MRN: [id number]  Unit/Bed#: E4 -01 Encounter: [id number]  Primary Care Provider: Aisha Martinez PA-C   Date and time admitted to hospital: 12/28/2023  6:05 PM     * COVID-19  Assessment & Plan  Not needing oxygen.  Okay to stop remdesivir     She feels almost back to her normal self, will get her  "home today     Thyroid nodule  Assessment & Plan  CT chest with \"incidental thyroid nodule(s) for which nonemergent thyroid ultrasound is recommended.\"  TSH 12/27 0.637  Outpatient follow-up     Acute pulmonary embolism without acute cor pulmonale (HCC)  Assessment & Plan  Anticoagulated on eliquis           Discharging Physician / Practitioner: Zachary Davis DO  PCP: Aisha Martinez PA-C  Admission Date:   Admission Orders (From admission, onward)          Ordered         12/28/23 2040   INPATIENT ADMISSION  Once                               Discharge Date: 12/30/23     Medical Problems         Resolved Problems  Date Reviewed: 12/28/2023   None                  Reason for Admission: Generalized weakness        Hospital Course:      Jolene Segundo is a 83 y.o. female patient who originally presented to the hospital on 12/28/2023 due to generalized weakness.  She is found to have COVID.  We started on COVID treatment, she however quickly weaned off of oxygen.  She does not need any further COVID treatment.  She feels back to her normal self.  Okay to get her home.       Incidental thyroid nodule.  This was seen on workup for COVID when she got a CT scan.  Recommend her PCP get a nonurgent thyroid ultrasound to further evaluate this nodule.     Please see above list of diagnoses and related plan for additional information.         Condition at Discharge:  stable          Discharge Day Visit / Exam:      Subjective:  feels well  Wants to go home.  No weakness  No SOB        Vitals: Blood Pressure: 137/70 (12/30/23 1100)  Pulse: 98 (12/30/23 1100)  Temperature: 98.1 °F (36.7 °C) (12/30/23 0816)  Temp Source: Temporal (12/30/23 0816)  Respirations: 18 (12/30/23 1100)  Height: 5' 2\" (157.5 cm) (12/29/23 0456)  Weight - Scale: 75.9 kg (167 lb 5.3 oz) (12/30/23 0549)  SpO2: 96 % (12/30/23 0816)     Exam:      Physical Exam  Vitals and nursing note reviewed.   HENT:      Head: Normocephalic and atraumatic.   Eyes: "      Pupils: Pupils are equal, round, and reactive to light.   Cardiovascular:      Rate and Rhythm: Normal rate and regular rhythm.      Heart sounds: No murmur heard.     No friction rub. No gallop.   Pulmonary:      Effort: Pulmonary effort is normal.      Breath sounds: Normal breath sounds. No wheezing or rales.   Abdominal:      General: Bowel sounds are normal.      Palpations: Abdomen is soft.      Tenderness: There is no abdominal tenderness.   Musculoskeletal:      Right lower leg: No edema.      Left lower leg: No edema.         .           Discharge instructions/Information to patient and family:   See after visit summary for information provided to patient and family.       Provisions for Follow-Up Care:  See after visit summary for information related to follow-up care and any pertinent home health orders.       Disposition:      Home        Discharge Statement:  I spent 36 minutes discharging the patient. This time was spent on the day of discharge. I had direct contact with the patient on the day of discharge. Greater than 50% of the total time was spent examining patient, answering all patient questions, arranging and discussing plan of care with patient as well as directly providing post-discharge instructions.  Additional time then spent on discharge activities.     Discharge Medications:  See after visit summary for reconciled discharge medications provided to patient and family.       ** Please Note: This note has been constructed using a voice recognition system **

## 2024-01-03 RX ORDER — HYDROCHLOROTHIAZIDE 25 MG/1
25 TABLET ORAL DAILY
Qty: 90 TABLET | Refills: 1 | Status: SHIPPED | OUTPATIENT
Start: 2024-01-03

## 2024-01-03 NOTE — UTILIZATION REVIEW
NOTIFICATION OF ADMISSION DISCHARGE   This is a Notification of Discharge from [facility]. Please be advised that this patient has been discharge from our facility. Below you will find the admission and discharge date and time including the patient’s disposition.   UTILIZATION REVIEW CONTACT:  Donna Sparrow  Utilization   Network Utilization Review Department  Phone: [phone] x carefully listen to the prompts. All voicemails are confidential.  Email: [email or web address]     ADMISSION INFORMATION  PRESENTATION DATE: 12/28/2023  6:05 PM    INPATIENT ADMISSION DATE: 12/28/23  8:40 PM   DISCHARGE DATE: 12/30/2023  4:37 PM   DISPOSITION:Home/Self Care    Network Utilization Review Department  ATTENTION: Please call with any questions or concerns to [phone] and carefully listen to the prompts so that you are directed to the right person. All voicemails are confidential.   For Discharge needs, contact Care Management DC Support Team at [phone] opt. 2  Send all requests for admission clinical reviews, approved or denied determinations and any other requests to dedicated fax number below belonging to the campus where the patient is receiving treatment. List of dedicated fax numbers for the Facilities:  FACILITY NAME UR FAX NUMBER   ADMISSION DENIALS (Administrative/Medical Necessity) [phone]   DISCHARGE SUPPORT TEAM ([facility]) [phone]   PARENT CHILD HEALTH (Maternity/NICU/Pediatrics) [phone]   [facility] [phone]   [facility] [phone]   [facility] [phone]   [facility] [phone]   [facility] [phone]   [facility] [phone]   [facility] [phone]   [facility] [phone]   [facility]  "[facility] [phone]   [facility] [phone]   [facility] [phone]     [facility]  Discharge- Jolene Segundo 1940, 83 y.o. female MRN: [id number]  Unit/Bed#: E4 -01 Encounter: [id number]  Primary Care Provider: Aisha Martinez PA-C   Date and time admitted to hospital: 12/28/2023  6:05 PM     * COVID-19  Assessment & Plan  Not needing oxygen.  Okay to stop remdesivir     She feels almost back to her normal self, will get her home today     Thyroid nodule  Assessment & Plan  CT chest with \"incidental thyroid nodule(s) for which nonemergent thyroid ultrasound is recommended.\"  TSH 12/27 0.637  Outpatient follow-up     Acute pulmonary embolism without acute cor pulmonale (HCC)  Assessment & Plan  Anticoagulated on eliquis           Discharging Physician / Practitioner: Zachary Davis DO  PCP: Aisha Martinez PA-C  Admission Date:   Admission Orders (From admission, onward)          Ordered         12/28/23 2040   INPATIENT ADMISSION  Once                               Discharge Date: 12/30/23     Medical Problems         Resolved Problems  Date Reviewed: 12/28/2023   None                  Reason for Admission: Generalized weakness        Hospital Course:      Jolene Segundo is a 83 y.o. female patient who originally presented to the hospital on 12/28/2023 due to generalized weakness.  She is found to have COVID.  We started on COVID treatment, she however quickly weaned off of oxygen.  She does not need any further COVID treatment.  She feels back to her normal self.  Okay to get her home.       Incidental thyroid nodule.  This was seen on workup for COVID when she got a CT scan.  Recommend her PCP get a nonurgent thyroid ultrasound to further evaluate this nodule.     Please see above list of diagnoses and related plan for additional information.         Condition at Discharge:  stable        " "  Discharge Day Visit / Exam:      Subjective:  feels well  Wants to go home.  No weakness  No SOB        Vitals: Blood Pressure: 137/70 (12/30/23 1100)  Pulse: 98 (12/30/23 1100)  Temperature: 98.1 °F (36.7 °C) (12/30/23 0816)  Temp Source: Temporal (12/30/23 0816)  Respirations: 18 (12/30/23 1100)  Height: 5' 2\" (157.5 cm) (12/29/23 0456)  Weight - Scale: 75.9 kg (167 lb 5.3 oz) (12/30/23 0549)  SpO2: 96 % (12/30/23 0816)     Exam:      Physical Exam  Vitals and nursing note reviewed.   HENT:      Head: Normocephalic and atraumatic.   Eyes:      Pupils: Pupils are equal, round, and reactive to light.   Cardiovascular:      Rate and Rhythm: Normal rate and regular rhythm.      Heart sounds: No murmur heard.     No friction rub. No gallop.   Pulmonary:      Effort: Pulmonary effort is normal.      Breath sounds: Normal breath sounds. No wheezing or rales.   Abdominal:      General: Bowel sounds are normal.      Palpations: Abdomen is soft.      Tenderness: There is no abdominal tenderness.   Musculoskeletal:      Right lower leg: No edema.      Left lower leg: No edema.         .           Discharge instructions/Information to patient and family:   See after visit summary for information provided to patient and family.       Provisions for Follow-Up Care:  See after visit summary for information related to follow-up care and any pertinent home health orders.       Disposition:      Home        Discharge Statement:  I spent 36 minutes discharging the patient. This time was spent on the day of discharge. I had direct contact with the patient on the day of discharge. Greater than 50% of the total time was spent examining patient, answering all patient questions, arranging and discussing plan of care with patient as well as directly providing post-discharge instructions.  Additional time then spent on discharge activities.     Discharge Medications:  See after visit summary for reconciled discharge medications provided " to patient and family.       ** Please Note: This note has been constructed using a voice recognition system **

## 2024-01-04 NOTE — UTILIZATION REVIEW
NOTIFICATION OF ADMISSION DISCHARGE   This is a Notification of Discharge from [facility]. Please be advised that this patient has been discharge from our facility. Below you will find the admission and discharge date and time including the patient’s disposition.   UTILIZATION REVIEW CONTACT:  Briana Rivera  Utilization   Network Utilization Review Department  Phone: [phone] x carefully listen to the prompts. All voicemails are confidential.  Email: [email or web address]     ADMISSION INFORMATION  PRESENTATION DATE: 12/28/2023  6:05 PM  OBERVATION ADMISSION DATE:   INPATIENT ADMISSION DATE: 12/28/23  8:40 PM   DISCHARGE DATE: 12/30/2023  4:37 PM   DISPOSITION:Home/Self Care    Network Utilization Review Department  ATTENTION: Please call with any questions or concerns to [phone] and carefully listen to the prompts so that you are directed to the right person. All voicemails are confidential.   For Discharge needs, contact Care Management DC Support Team at [phone] opt. 2  Send all requests for admission clinical reviews, approved or denied determinations and any other requests to dedicated fax number below belonging to the campus where the patient is receiving treatment. List of dedicated fax numbers for the Facilities:  FACILITY NAME UR FAX NUMBER   ADMISSION DENIALS (Administrative/Medical Necessity) [phone]   DISCHARGE SUPPORT TEAM ([facility]) [phone]   PARENT CHILD HEALTH (Maternity/NICU/Pediatrics) [phone]   [facility] [phone]   [facility] [phone]   [facility] [phone]   [facility] [phone]   [facility] [phone]   [facility] [phone]   [facility] [phone]   [facility] [phone]    [facility] [phone]   [facility] [phone]   [facility] [phone]

## 2024-01-16 ENCOUNTER — RA CDI HCC (OUTPATIENT)
Dept: OTHER | Facility: HOSPITAL | Age: 84
End: 2024-01-16

## 2024-01-24 ENCOUNTER — HOSPITAL ENCOUNTER (OUTPATIENT)
Dept: ULTRASOUND IMAGING | Facility: HOSPITAL | Age: 84
Discharge: HOME | End: 2024-01-24
Payer: COMMERCIAL

## 2024-01-24 DIAGNOSIS — E04.1 THYROID NODULE: ICD-10-CM

## 2024-01-24 PROCEDURE — 76536 US EXAM OF HEAD AND NECK: CPT

## 2024-01-26 ENCOUNTER — RA CDI HCC (OUTPATIENT)
Dept: OTHER | Facility: HOSPITAL | Age: 84
End: 2024-01-26

## 2024-01-26 ENCOUNTER — OFFICE VISIT (OUTPATIENT)
Dept: FAMILY MEDICINE CLINIC | Facility: CLINIC | Age: 84
End: 2024-01-26
Payer: COMMERCIAL

## 2024-01-26 VITALS
SYSTOLIC BLOOD PRESSURE: 130 MMHG | WEIGHT: 168 LBS | BODY MASS INDEX: 30.91 KG/M2 | OXYGEN SATURATION: 97 % | HEIGHT: 62 IN | DIASTOLIC BLOOD PRESSURE: 70 MMHG | HEART RATE: 105 BPM

## 2024-01-26 DIAGNOSIS — E78.2 MIXED HYPERLIPIDEMIA: ICD-10-CM

## 2024-01-26 DIAGNOSIS — I26.99 ACUTE PULMONARY EMBOLISM WITHOUT ACUTE COR PULMONALE, UNSPECIFIED PULMONARY EMBOLISM TYPE (HCC): ICD-10-CM

## 2024-01-26 DIAGNOSIS — E11.39 TYPE 2 DIABETES MELLITUS WITH OTHER OPHTHALMIC COMPLICATION, WITHOUT LONG-TERM CURRENT USE OF INSULIN (HCC): ICD-10-CM

## 2024-01-26 DIAGNOSIS — E87.1 HYPONATREMIA: ICD-10-CM

## 2024-01-26 DIAGNOSIS — E55.9 VITAMIN D DEFICIENCY: ICD-10-CM

## 2024-01-26 DIAGNOSIS — E11.311 TYPE 2 DIABETES MELLITUS WITH LEFT EYE AFFECTED BY RETINOPATHY AND MACULAR EDEMA, WITHOUT LONG-TERM CURRENT USE OF INSULIN, UNSPECIFIED RETINOPATHY SEVERITY (HCC): Primary | ICD-10-CM

## 2024-01-26 DIAGNOSIS — I10 PRIMARY HYPERTENSION: ICD-10-CM

## 2024-01-26 DIAGNOSIS — K51.40 PSEUDOPOLYPOSIS OF COLON WITHOUT COMPLICATION, UNSPECIFIED PART OF COLON (HCC): ICD-10-CM

## 2024-01-26 DIAGNOSIS — Z12.31 ENCOUNTER FOR SCREENING MAMMOGRAM FOR MALIGNANT NEOPLASM OF BREAST: ICD-10-CM

## 2024-01-26 DIAGNOSIS — E04.1 THYROID NODULE: ICD-10-CM

## 2024-01-26 DIAGNOSIS — M31.6 TEMPORAL ARTERITIS (HCC): ICD-10-CM

## 2024-01-26 PROCEDURE — 99214 OFFICE O/P EST MOD 30 MIN: CPT | Performed by: PHYSICIAN ASSISTANT

## 2024-01-26 NOTE — ASSESSMENT & PLAN NOTE
Patient continues without medication for this diagnosis however A1c is due.  Check fasting labs.  Lab Results   Component Value Date    HGBA1C 5.4 07/11/2023

## 2024-01-26 NOTE — PROGRESS NOTES
Name: Jolene Segundo      : 1940      MRN: [id number]  Encounter Provider: Aisha Martinez PA-C  Encounter Date: 2024   Encounter department: [facility] PRIMARY CARE    Assessment & Plan     1. 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  Check fasting labs.  Lab Results   Component Value Date    HGBA1C 5.4 2023       2. Acute pulmonary embolism without acute cor pulmonale, unspecified pulmonary embolism type (HCC)  Assessment & Plan:  Resolved from March as seen on recent December imaging.  Patient should be taken off Eliquis.  Per pulmonary report she should have been on Eliquis for roughly 6 months with a follow-up which never happened.  It was thought to be because of her sedentary lifestyle.  Discontinue now.      3. Temporal arteritis (HCC)  Assessment & Plan:  Resolved.      4. Pseudopolyposis of colon without complication, unspecified part of colon (HCC)  Assessment & Plan:  Stable patient having no bowel complaints at this time.      5. Type 2 diabetes mellitus with other ophthalmic complication, without long-term current use of insulin (HCC)  -     Hemoglobin A1C; Future; Expected date: 2024  -     Comprehensive metabolic panel; Future; Expected date: 2024    6. Primary hypertension  Assessment & Plan:  Well-controlled continue current medication.      7. Mixed hyperlipidemia  Assessment & Plan:  Lipid panel due order reprinted.    Orders:  -     Lipid Panel with Direct LDL reflex; Future; Expected date: 2024    8. Hyponatremia  Assessment & Plan:  Patient had hyponatremia while admitted for COVID at the end of December repeat CMP.      9. Vitamin D deficiency  Assessment & Plan:  Vitamin D due.  Order reprinted.      10. Thyroid nodule  Assessment  & Plan:  Thyroid ultrasound currently pending.      11. Encounter for screening mammogram for malignant neoplasm of breast  -     Mammo screening bilateral w 3d & cad; Future; Expected date: 01/26/2024        Depression Screening and Follow-up Plan: Patient was screened for depression during today's encounter. They screened negative with a PHQ-2 score of 0.        Subjective        Jolene Segundo is here for chronic conditions f/u. Pt. had labs done prior to today's visit which included Recent Results (from the past 672 hour(s))  -BMP Q8 hours X 3 (Hyponatremia monitoring):   Collection Time: 12/29/23  9:47 AM       Result                      Value             Ref Range           Sodium                      129 (L)           135 - 147 mm*       Potassium                   3.0 (L)           3.5 - 5.3 mm*       Chloride                    92 (L)            96 - 108 mmo*       CO2                         30                21 - 32 mmol*       ANION GAP                   7                 mmol/L              BUN                         19                5 - 25 mg/dL        Creatinine                  0.84              0.60 - 1.30 *       Glucose                     110               65 - 140 mg/*       Calcium                     9.6               8.4 - 10.2 m*       eGFR                        64                ml/min/1.73s*  -BMP Q8 hours X 3 (Hyponatremia monitoring):   Collection Time: 12/29/23  4:33 PM       Result                      Value             Ref Range           Sodium                      128 (L)           135 - 147 mm*       Potassium                   3.3 (L)           3.5 - 5.3 mm*       Chloride                    95 (L)            96 - 108 mmo*       CO2                         29                21 - 32 mmol*       ANION GAP                   4                 mmol/L              BUN                         19                5 - 25 mg/dL        Creatinine                  0.84              0.60 -  1.30 *       Glucose                     97                65 - 140 mg/*       Calcium                     9.3               8.4 - 10.2 m*       eGFR                        64                ml/min/1.73s*  -CBC and differential:   Collection Time: 12/30/23  5:47 AM       Result                      Value             Ref Range           WBC                         4.10 (L)          4.31 - 10.16*       RBC                         3.83              3.81 - 5.12 *       Hemoglobin                  11.1 (L)          11.5 - 15.4 *       Hematocrit                  32.2 (L)          34.8 - 46.1 %       MCV                         84                82 - 98 fL          MCH                         29.0              26.8 - 34.3 *       MCHC                        34.5              31.4 - 37.4 *       RDW                         13.4              11.6 - 15.1 %       MPV                         11.3              8.9 - 12.7 fL       Platelets                   168               149 - 390 Th*       nRBC                        0                 /100 WBCs      -Basic metabolic panel:   Collection Time: 12/30/23  5:47 AM       Result                      Value             Ref Range           Sodium                      133 (L)           135 - 147 mm*       Potassium                   3.3 (L)           3.5 - 5.3 mm*       Chloride                    96                96 - 108 mmo*       CO2                         32                21 - 32 mmol*       ANION GAP                   5                 mmol/L              BUN                         16                5 - 25 mg/dL        Creatinine                  0.70              0.60 - 1.30 *       Glucose                     88                65 - 140 mg/*       Calcium                     9.3               8.4 - 10.2 m*       eGFR                        80                ml/min/1.73s*  -C-reactive protein:   Collection Time: 12/30/23  5:47 AM       Result                      Value       "       Ref Range           CRP                         79.7 (H)          <3.0 mg/L      -Ferritin:   Collection Time: 12/30/23  5:47 AM       Result                      Value             Ref Range           Ferritin                    1,392 (H)         11 - 307 ng/*        Review of Systems   Constitutional: Negative.    HENT: Negative.     Eyes: Negative.    Respiratory: Negative.     Cardiovascular: Negative.    Gastrointestinal: Negative.    Endocrine: Negative.    Genitourinary: Negative.    Musculoskeletal: Negative.    Skin: Negative.    Allergic/Immunologic: Negative.    Neurological: Negative.    Hematological: Negative.    Psychiatric/Behavioral: Negative.         Current Outpatient Medications on File Prior to Visit   Medication Sig   • ascorbic acid (VITAMIN C) 250 mg tablet Take 250 mg by mouth daily   • Cholecalciferol (VITAMIN D) 2000 units CAPS Take 1 capsule by mouth 2 (two) times a day   • hydrochlorothiazide (HYDRODIURIL) 25 mg tablet TAKE 1 TABLET (25 MG TOTAL) BY MOUTH DAILY.   • latanoprost (XALATAN) 0.005 % ophthalmic solution    • metoprolol tartrate (LOPRESSOR) 50 mg tablet TAKE 1 TABLET BY MOUTH EVERY 12 HOURS   • nystatin (MYCOSTATIN) cream Apply topically 2 (two) times a day   • pravastatin (PRAVACHOL) 10 mg tablet TAKE 1 TABLET BY MOUTH EVERYDAY AT BEDTIME   • [DISCONTINUED] Eliquis 5 MG TAKE 1 TABLET BY MOUTH TWICE A DAY   • ALPRAZolam (XANAX) 0.25 mg tablet Take 1 tablet (0.25 mg total) by mouth daily as needed for anxiety (Patient not taking: Reported on 1/26/2024)       Objective     /70 (BP Location: Left arm, Patient Position: Sitting, Cuff Size: Standard)   Pulse 105   Ht 5' 2\" (1.575 m)   Wt 76.2 kg (168 lb)   SpO2 97%   BMI 30.73 kg/m²     Physical Exam  Vitals and nursing note reviewed.   Constitutional:       General: She is not in acute distress.     Appearance: She is well-developed. She is not diaphoretic.   HENT:      Head: Normocephalic and atraumatic.      " Nose: Nose normal.   Eyes:      General:         Right eye: No discharge.         Left eye: No discharge.      Conjunctiva/sclera: Conjunctivae normal.   Neck:      Vascular: No carotid bruit.   Cardiovascular:      Rate and Rhythm: Normal rate and regular rhythm.      Heart sounds: Normal heart sounds. No murmur heard.     No friction rub. No gallop.   Pulmonary:      Effort: Pulmonary effort is normal. No respiratory distress.      Breath sounds: Normal breath sounds. No wheezing or rales.   Musculoskeletal:      Cervical back: Neck supple.   Skin:     General: Skin is warm and dry.   Neurological:      Mental Status: She is alert and oriented to person, place, and time.   Psychiatric:         Judgment: Judgment normal.       Aisha Martinez PA-C

## 2024-01-26 NOTE — ASSESSMENT & PLAN NOTE
Resolved from March as seen on recent December imaging.  Patient should be taken off Eliquis.  Per pulmonary report she should have been on Eliquis for roughly 6 months with a follow-up which never happened.  It was thought to be because of her sedentary lifestyle.  Discontinue now.

## 2024-01-26 NOTE — PATIENT INSTRUCTIONS
1. 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  Check fasting labs.  Lab Results   Component Value Date    HGBA1C 5.4 07/11/2023         2. Acute pulmonary embolism without acute cor pulmonale, unspecified pulmonary embolism type (HCC)  Assessment & Plan:  Resolved from March as seen on recent December imaging.  Patient should be taken off Eliquis.  Per pulmonary report she should have been on Eliquis for roughly 6 months with a follow-up which never happened.  It was thought to be because of her sedentary lifestyle.  Discontinue now.      3. Temporal arteritis (HCC)  Assessment & Plan:  Resolved.      4. Pseudopolyposis of colon without complication, unspecified part of colon (HCC)    5. Type 2 diabetes mellitus with other ophthalmic complication, without long-term current use of insulin (HCC)    6. Primary hypertension  Assessment & Plan:  Well-controlled continue current medication.      7. Mixed hyperlipidemia  Assessment & Plan:  Lipid panel due order reprinted.      8. Hyponatremia  Assessment & Plan:  Patient had hyponatremia while admitted for COVID at the end of December repeat CMP.      9. Vitamin D deficiency  Assessment & Plan:  Vitamin D due.  Order reprinted.      10. Thyroid nodule  Assessment & Plan:  Thyroid ultrasound currently pending.

## 2024-01-30 ENCOUNTER — LAB (OUTPATIENT)
Dept: LAB | Facility: CLINIC | Age: 84
End: 2024-01-30
Payer: COMMERCIAL

## 2024-01-30 DIAGNOSIS — E11.311 TYPE 2 DIABETES MELLITUS WITH LEFT EYE AFFECTED BY RETINOPATHY AND MACULAR EDEMA, WITHOUT LONG-TERM CURRENT USE OF INSULIN, UNSPECIFIED RETINOPATHY SEVERITY (HCC): ICD-10-CM

## 2024-01-30 DIAGNOSIS — E78.2 MIXED HYPERLIPIDEMIA: ICD-10-CM

## 2024-01-30 DIAGNOSIS — E55.9 VITAMIN D DEFICIENCY: ICD-10-CM

## 2024-01-30 LAB
25(OH)D3 SERPL-MCNC: 72.3 NG/ML (ref 30–100)
ALBUMIN SERPL BCP-MCNC: 4.1 G/DL (ref 3.5–5)
ALP SERPL-CCNC: 53 U/L (ref 34–104)
ALT SERPL W P-5'-P-CCNC: 11 U/L (ref 7–52)
ANION GAP SERPL CALCULATED.3IONS-SCNC: 7 MMOL/L
AST SERPL W P-5'-P-CCNC: 14 U/L (ref 13–39)
BILIRUB SERPL-MCNC: 0.67 MG/DL (ref 0.2–1)
BUN SERPL-MCNC: 15 MG/DL (ref 5–25)
CALCIUM SERPL-MCNC: 10.5 MG/DL (ref 8.4–10.2)
CHLORIDE SERPL-SCNC: 101 MMOL/L (ref 96–108)
CHOLEST SERPL-MCNC: 167 MG/DL
CO2 SERPL-SCNC: 32 MMOL/L (ref 21–32)
CREAT SERPL-MCNC: 0.79 MG/DL (ref 0.6–1.3)
CREAT UR-MCNC: 38.9 MG/DL
ERYTHROCYTE [DISTWIDTH] IN BLOOD BY AUTOMATED COUNT: 13.9 % (ref 11.6–15.1)
EST. AVERAGE GLUCOSE BLD GHB EST-MCNC: 114 MG/DL
GFR SERPL CREATININE-BSD FRML MDRD: 69 ML/MIN/1.73SQ M
GLUCOSE P FAST SERPL-MCNC: 110 MG/DL (ref 65–99)
HBA1C MFR BLD: 5.6 %
HCT VFR BLD AUTO: 40.1 % (ref 34.8–46.1)
HDLC SERPL-MCNC: 56 MG/DL
HGB BLD-MCNC: 12.6 G/DL (ref 11.5–15.4)
LDLC SERPL CALC-MCNC: 78 MG/DL (ref 0–100)
MCH RBC QN AUTO: 28.6 PG (ref 26.8–34.3)
MCHC RBC AUTO-ENTMCNC: 31.4 G/DL (ref 31.4–37.4)
MCV RBC AUTO: 91 FL (ref 82–98)
MICROALBUMIN UR-MCNC: <7 MG/L
MICROALBUMIN/CREAT 24H UR: <18 MG/G CREATININE (ref 0–30)
PLATELET # BLD AUTO: 285 THOUSANDS/UL (ref 149–390)
PMV BLD AUTO: 11.1 FL (ref 8.9–12.7)
POTASSIUM SERPL-SCNC: 4.1 MMOL/L (ref 3.5–5.3)
PROT SERPL-MCNC: 7.2 G/DL (ref 6.4–8.4)
RBC # BLD AUTO: 4.41 MILLION/UL (ref 3.81–5.12)
SODIUM SERPL-SCNC: 140 MMOL/L (ref 135–147)
TRIGL SERPL-MCNC: 164 MG/DL
WBC # BLD AUTO: 5.41 THOUSAND/UL (ref 4.31–10.16)

## 2024-01-30 PROCEDURE — 80053 COMPREHEN METABOLIC PANEL: CPT

## 2024-01-30 PROCEDURE — 82043 UR ALBUMIN QUANTITATIVE: CPT

## 2024-01-30 PROCEDURE — 85027 COMPLETE CBC AUTOMATED: CPT

## 2024-01-30 PROCEDURE — 82306 VITAMIN D 25 HYDROXY: CPT

## 2024-01-30 PROCEDURE — 36415 COLL VENOUS BLD VENIPUNCTURE: CPT

## 2024-01-30 PROCEDURE — 80061 LIPID PANEL: CPT

## 2024-01-30 PROCEDURE — 82570 ASSAY OF URINE CREATININE: CPT

## 2024-01-30 PROCEDURE — 83036 HEMOGLOBIN GLYCOSYLATED A1C: CPT

## 2024-02-01 ENCOUNTER — TELEPHONE (OUTPATIENT)
Age: 84
End: 2024-02-01

## 2024-02-01 NOTE — TELEPHONE ENCOUNTER
Mylene from radiology called and stated there is a report ready to view in patients chart. This is of the ultrasound of thyroid and there are significant findings. They are recommending a 12 month follow up

## 2024-02-01 NOTE — RESULT ENCOUNTER NOTE
Please let the patient know that her blood work is very stable overall.  Slight elevation in calcium which I believe is a lab error and triglycerides which is reflective of the holiday season.  No change in any medication at this time.  She has orders already to repeat in 6 months.

## 2024-02-02 DIAGNOSIS — E04.2 MULTIPLE THYROID NODULES: Primary | ICD-10-CM

## 2024-02-02 NOTE — RESULT ENCOUNTER NOTE
Please let pt know that her thyroid US shows she has four larger thyroid nodules. I have placed an order to obtain thyroid function blood work to do which is non fasting and also a repeat US to do in one year.

## 2024-02-06 ENCOUNTER — APPOINTMENT (OUTPATIENT)
Dept: LAB | Facility: CLINIC | Age: 84
End: 2024-02-06
Payer: COMMERCIAL

## 2024-02-06 LAB — TSH SERPL DL<=0.05 MIU/L-ACNC: 1.38 UIU/ML (ref 0.45–4.5)

## 2024-02-06 PROCEDURE — 84443 ASSAY THYROID STIM HORMONE: CPT | Performed by: PHYSICIAN ASSISTANT

## 2024-02-06 PROCEDURE — 36415 COLL VENOUS BLD VENIPUNCTURE: CPT | Performed by: PHYSICIAN ASSISTANT

## 2024-02-19 ENCOUNTER — OFFICE VISIT (OUTPATIENT)
Dept: HEMATOLOGY ONCOLOGY | Facility: CLINIC | Age: 84
End: 2024-02-19
Payer: COMMERCIAL

## 2024-02-19 VITALS
DIASTOLIC BLOOD PRESSURE: 58 MMHG | WEIGHT: 170 LBS | BODY MASS INDEX: 31.28 KG/M2 | OXYGEN SATURATION: 96 % | HEIGHT: 62 IN | SYSTOLIC BLOOD PRESSURE: 158 MMHG | HEART RATE: 79 BPM | RESPIRATION RATE: 18 BRPM | TEMPERATURE: 97.9 F

## 2024-02-19 DIAGNOSIS — D72.819 LEUKOPENIA, UNSPECIFIED TYPE: ICD-10-CM

## 2024-02-19 PROCEDURE — 99204 OFFICE O/P NEW MOD 45 MIN: CPT

## 2024-02-19 NOTE — PROGRESS NOTES
[address]  [facility] HEMATOLOGY ONCOLOG[facility] SPECIALISTS [place]  [address]  [facility] [id number]  Hematology Ambulatory Consult  Jolene Segundo, 1940, [id number]  2/19/2024    Assessment/Plan:  1. Leukopenia, unspecified type  Ms. Segundo is an 83-year-old female seen in consultation for leukopenia.  She had leukopenia during hospitalization for COVID-19 infection.  I explained to the patient that this is common with a viral illness such as COVID.  We also discussed that during hospitalization it differential was conformed and there is no abnormal or immature cells noted.  All other cell lines are within normal limits.  Prior to and after hospitalization her white blood cell count has normalized.  She has never had issues with recurrent or chronic infections.  She does not require antibiotics routinely.  She does not have any B symptoms.  She is a very active woman and cares for her home and does light housework.  Since most recent CBC is within normal limits with a normal differential and the patient does not have any symptoms of concern no further workup is necessary at this time.  I stressed the importance of her continued follow-up with her PCP with annual/routine labs.  B symptoms explained to the patient in which she is to let her PCP know immediately for evaluation.    - Ambulatory Referral to Hematology / Oncology      Patient voiced agreement and understanding to the above. Patient knows to call the Hematology/Oncology office with any questions and concerns regarding the above.      Barrier(s) to care: None.   The patient is able to self care.    -------------------------------------------------------------------------------------------------------    Chief Complaint   Patient presents with    Consult     Patient being seen for consult for leukopenia. Labs done 1/30/2024 & 2/6/2024.       Referring provider:  Raman Monson MD  [address] [facility] [address]  [place]  [address]    History of present illness:  Jolene Segundo is an 83-year-old female with past medical history of type 2 diabetes, hypertension, varicose veins, history of pulmonary embolism not on anticoagulation, osteopenia, osteoarthritis, and hyperlipidemia.  She is seen in consultation for leukopenia.  This was noted during hospitalization for COVID-19 infection.  Prior to hospitalization white blood cell count within normal limits.  During hospitalization differential within normal limits.  Since discharge white blood cell count has normalized and all other cell lines are within normal limits.  She has never had an opportunistic infection or get frequent infections.  She denies any B symptoms.  She has not required antibiotics recently.  She is very active atleast 2500 steps per day in the winter throughout her home. Will be outside once weather is nicer.  She is able to care for herself and do housework.  She has a history of a provoked DVT PE with no heart strain.  She was treated with Eliquis which she is not on any longer and not on aspirin currently.              Review of Systems   Constitutional: Negative.    HENT: Negative.     Eyes: Negative.    Respiratory: Negative.     Cardiovascular: Negative.    Gastrointestinal: Negative.    Endocrine: Negative.    Genitourinary: Negative.    Musculoskeletal: Negative.    Skin: Negative.    Neurological: Negative.    Hematological: Negative.    Psychiatric/Behavioral: Negative.         Patient Active Problem List   Diagnosis    Allergic rhinitis    Alopecia    Mixed hyperlipidemia    Hypertension    Osteopenia    Soft tissue neoplasm    Synovial cyst    Vitamin D deficiency    Generalized weakness    Primary osteoarthritis of right knee    Atrophic vaginitis    Murmur, cardiac    Gross hematuria    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    Hypercalcemia    Pseudopolyposis of colon without complication, unspecified part of colon (HCC)    Trupti emery  both feet    Onychomycosis    Constipation    Anxiety    Ambulatory dysfunction    Hematuria    Sinus tachycardia    Hypokalemia    Memory changes    Acute pulmonary embolism without acute cor pulmonale (HCC)    Weight loss    Dermatitis    COVID-19    Hyponatremia    KAROLINA (acute kidney injury) (HCC)    Multiple thyroid nodules    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, without long-term current use of insulin, unspecified retinopathy severity (HCC)       Past Medical History:   Diagnosis Date    Diabetes mellitus (HCC)     Hemorrhoids     Hyperlipidemia     Hypertension     ovarian     Pseudopolyposis of colon without complication, unspecified part of colon (HCC) 07/01/2022       Past Surgical History:   Procedure Laterality Date    BREAST BIOPSY      Bx breast percutan needle core use image guide (stereotactic)    CATARACT EXTRACTION      COLONOSCOPY      HEMORROIDECTOMY      OR LIGATION/BIOPSY TEMPORAL ARTERY Bilateral 10/17/2022    Procedure: BIOPSY ARTERY TEMPORAL;  Surgeon: Mary Eldridge DO;  Location: [facility];  Service: Vascular    TOTAL ABDOMINAL HYSTERECTOMY         Family History   Problem Relation Age of Onset    Heart disease Mother     Lung cancer Mother     Uterine cancer Mother     Heart disease Father     Breast cancer Sister     Diabetes Maternal Grandmother        Social History     Socioeconomic History    Marital status: /Civil Union     Spouse name: None    Number of children: None    Years of education: None    Highest education level: None   Occupational History    None   Tobacco Use    Smoking status: Never    Smokeless tobacco: Never   Vaping Use    Vaping status: None   Substance and Sexual Activity    Alcohol use: Not Currently     Comment: rarely    Drug use: Never    Sexual activity: None   Other Topics Concern    None   Social History Narrative        Retired     Social Determinants of Health     Financial Resource Strain:  Low Risk  (7/21/2023)    Overall Financial Resource Strain (CARDIA)     Difficulty of Paying Living Expenses: Not very hard   Food Insecurity: No Food Insecurity (12/21/2022)    Hunger Vital Sign     Worried About Running Out of Food in the Last Year: Never true     Ran Out of Food in the Last Year: Never true   Transportation Needs: No Transportation Needs (7/21/2023)    PRAPARE - Transportation     Lack of Transportation (Medical): No     Lack of Transportation (Non-Medical): No   Physical Activity: Not on file   Stress: Not on file   Social Connections: Not on file   Intimate Partner Violence: Not on file   Housing Stability: Low Risk  (12/21/2022)    Housing Stability Vital Sign     Unable to Pay for Housing in the Last Year: No     Number of Places Lived in the Last Year: 1     Unstable Housing in the Last Year: No         Current Outpatient Medications:     ascorbic acid (VITAMIN C) 250 mg tablet, Take 500 mg by mouth daily, Disp: , Rfl:     Cholecalciferol (VITAMIN D) 2000 units CAPS, Take 1 capsule by mouth 2 (two) times a day, Disp: , Rfl:     hydrochlorothiazide (HYDRODIURIL) 25 mg tablet, TAKE 1 TABLET (25 MG TOTAL) BY MOUTH DAILY., Disp: 90 tablet, Rfl: 1    latanoprost (XALATAN) 0.005 % ophthalmic solution, , Disp: , Rfl:     metoprolol tartrate (LOPRESSOR) 50 mg tablet, TAKE 1 TABLET BY MOUTH EVERY 12 HOURS, Disp: 180 tablet, Rfl: 1    pravastatin (PRAVACHOL) 10 mg tablet, TAKE 1 TABLET BY MOUTH EVERYDAY AT BEDTIME, Disp: 90 tablet, Rfl: 1    ALPRAZolam (XANAX) 0.25 mg tablet, Take 1 tablet (0.25 mg total) by mouth daily as needed for anxiety (Patient not taking: Reported on 1/26/2024), Disp: 10 tablet, Rfl: 0    nystatin (MYCOSTATIN) cream, Apply topically 2 (two) times a day (Patient not taking: Reported on 2/19/2024), Disp: 30 g, Rfl: 2    Allergies   Allergen Reactions    Iodinated Contrast Media Hives       Objective:  /58 (BP Location: Right arm, Patient Position: Sitting, Cuff Size: Large)  "  Pulse 79   Temp 97.9 °F (36.6 °C) (Temporal)   Resp 18   Ht 5' 2\" (1.575 m)   Wt 77.1 kg (170 lb)   SpO2 96%   BMI 31.09 kg/m²   Physical Exam  Constitutional:       General: She is not in acute distress.     Appearance: Normal appearance. She is not ill-appearing.   HENT:      Head: Normocephalic and atraumatic.   Eyes:      Extraocular Movements: Extraocular movements intact.      Conjunctiva/sclera: Conjunctivae normal.   Cardiovascular:      Rate and Rhythm: Normal rate.      Pulses: Normal pulses.   Pulmonary:      Effort: Pulmonary effort is normal. No respiratory distress.   Abdominal:      General: Bowel sounds are normal. There is no distension.      Tenderness: There is no abdominal tenderness.   Musculoskeletal:         General: Normal range of motion.      Cervical back: Normal range of motion. No tenderness.      Right lower leg: No edema.      Left lower leg: No edema.   Lymphadenopathy:      Cervical: No cervical adenopathy.   Skin:     General: Skin is warm and dry.      Capillary Refill: Capillary refill takes less than 2 seconds.      Coloration: Skin is not jaundiced or pale.   Neurological:      General: No focal deficit present.      Mental Status: She is alert and oriented to person, place, and time. Mental status is at baseline.      Motor: No weakness.      Gait: Gait normal.   Psychiatric:         Mood and Affect: Mood normal.         Behavior: Behavior normal.         Thought Content: Thought content normal.         Judgment: Judgment normal.         Result Review  Labs:   Orders Only on 02/02/2024   Component Date Value Ref Range Status    TSH 3RD [facility] 02/06/2024 1.383  0.450 - 4.500 uIU/mL Final    The recommended reference ranges for TSH during pregnancy are as follows:   First trimester 0.100 to 2.500 uIU/mL   Second trimester  0.200 to 3.000 uIU/mL   Third trimester 0.300 to 3.000 uIU/m    Note: Normal ranges may not apply to patients who are transgender, non-binary, or " whose legal sex, sex at birth, and gender identity differ.  Adult TSH (3rd generation) reference range follows the recommended guidelines of the American Thyroid Association, January, 2020.   Lab on 01/30/2024   Component Date Value Ref Range Status    Creatinine, Ur 01/30/2024 38.9  Reference range not established. mg/dL Final    Albumin,U,Random 01/30/2024 <7.0  <20.0 mg/L Final    Albumin Creat Ratio 01/30/2024 <18  0 - 30 mg/g creatinine Final    Sodium 01/30/2024 140  135 - 147 mmol/L Final    Potassium 01/30/2024 4.1  3.5 - 5.3 mmol/L Final    Chloride 01/30/2024 101  96 - 108 mmol/L Final    CO2 01/30/2024 32  21 - 32 mmol/L Final    ANION GAP 01/30/2024 7  mmol/L Final    BUN 01/30/2024 15  5 - 25 mg/dL Final    Creatinine 01/30/2024 0.79  0.60 - 1.30 mg/dL Final    Standardized to IDMS reference method    Glucose, Fasting 01/30/2024 110 (H)  65 - 99 mg/dL Final    Calcium 01/30/2024 10.5 (H)  8.4 - 10.2 mg/dL Final    AST 01/30/2024 14  13 - 39 U/L Final    ALT 01/30/2024 11  7 - 52 U/L Final    Specimen collection should occur prior to Sulfasalazine administration due to the potential for falsely depressed results.     Alkaline Phosphatase 01/30/2024 53  34 - 104 U/L Final    Total Protein 01/30/2024 7.2  6.4 - 8.4 g/dL Final    Albumin 01/30/2024 4.1  3.5 - 5.0 g/dL Final    Total Bilirubin 01/30/2024 0.67  0.20 - 1.00 mg/dL Final    Use of this assay is not recommended for patients undergoing treatment with eltrombopag due to the potential for falsely elevated results.  N-acetyl-p-benzoquinone imine (metabolite of Acetaminophen) will generate erroneously low results in samples for patients that have taken an overdose of Acetaminophen.    eGFR 01/30/2024 69  ml/min/1.73sq m Final    Hemoglobin A1C 01/30/2024 5.6  Normal 4.0-5.6%; PreDiabetic 5.7-6.4%; Diabetic >=6.5%; Glycemic control for adults with diabetes <7.0% % Final    EAG 01/30/2024 114  mg/dl Final    Cholesterol 01/30/2024 167  See Comment mg/dL  Final    Cholesterol:         Pediatric <18 Years        Desirable          <170 mg/dL      Borderline High    170-199 mg/dL      High               >=200 mg/dL        Adult >=18 Years            Desirable         <200 mg/dL      Borderline High   200-239 mg/dL      High              >239 mg/dL      Triglycerides 01/30/2024 164 (H)  See Comment mg/dL Final    Triglyceride:     0-9Y            <75mg/dL     10Y-17Y         <90 mg/dL       >=18Y     Normal          <150 mg/dL     Borderline High 150-199 mg/dL     High            200-499 mg/dL        Very High       >499 mg/dL    Specimen collection should occur prior to Metamizole administration due to the potential for falsely depressed results.    HDL, Direct 01/30/2024 56  >=50 mg/dL Final    LDL Calculated 01/30/2024 78  0 - 100 mg/dL Final    LDL Cholesterol:     Optimal           <100 mg/dl     Near Optimal      100-129 mg/dl     Above Optimal       Borderline High 130-159 mg/dl       High            160-189 mg/dl       Very High       >189 mg/dl         This screening LDL is a calculated result.   It does not have the accuracy of the Direct Measured LDL in the monitoring of patients with hyperlipidemia and/or statin therapy.   Direct Measure LDL (AEY205) must be ordered separately in these patients.    WBC 01/30/2024 5.41  4.31 - 10.16 Thousand/uL Final    RBC 01/30/2024 4.41  3.81 - 5.12 Million/uL Final    Hemoglobin 01/30/2024 12.6  11.5 - 15.4 g/dL Final    Hematocrit 01/30/2024 40.1  34.8 - 46.1 % Final    MCV 01/30/2024 91  82 - 98 fL Final    MCH 01/30/2024 28.6  26.8 - 34.3 pg Final    MCHC 01/30/2024 31.4  31.4 - 37.4 g/dL Final    RDW 01/30/2024 13.9  11.6 - 15.1 % Final    Platelets 01/30/2024 285  149 - 390 Thousands/uL Final    MPV 01/30/2024 11.1  8.9 - 12.7 fL Final    Vit D, 25-Hydroxy 01/30/2024 72.3  30.0 - 100.0 ng/mL Final    Vitamin D guidelines established by Clinical Guidelines Subcommittee  of the Endocrine Society Task Force,  2011    Deficiency <20ng/ml   Insufficiency 20-30ng/ml   Sufficient  ng/ml            Imaging:  no relevant imaging to review      Please note:  This report has been generated by a voice recognition software system. Therefore there may be syntax, spelling, and/or grammatical errors. Please call if you have any questions.

## 2024-04-05 ENCOUNTER — TELEPHONE (OUTPATIENT)
Age: 84
End: 2024-04-05

## 2024-04-05 DIAGNOSIS — R92.8 ABNORMAL MAMMOGRAM: Primary | ICD-10-CM

## 2024-04-05 NOTE — TELEPHONE ENCOUNTER
[facility] requesting Left Diagnostic Mammogram and Left Limited U/S.    Fax order [phone].

## 2024-04-22 DIAGNOSIS — E78.2 MIXED HYPERLIPIDEMIA: ICD-10-CM

## 2024-04-22 RX ORDER — PRAVASTATIN SODIUM 10 MG
TABLET ORAL
Qty: 90 TABLET | Refills: 1 | Status: SHIPPED | OUTPATIENT
Start: 2024-04-22

## 2024-04-29 ENCOUNTER — TELEPHONE (OUTPATIENT)
Age: 84
End: 2024-04-29

## 2024-04-29 NOTE — TELEPHONE ENCOUNTER
Patients  called stating he had it written on the calendar to get lab work completed, but he wasn't sure if this was for the patient or himself.  Advised him the patient is not due to repeat labs for 6 months. He is aware.

## 2024-06-17 ENCOUNTER — TELEPHONE (OUTPATIENT)
Dept: FAMILY MEDICINE CLINIC | Facility: CLINIC | Age: 84
End: 2024-06-17

## 2024-06-17 DIAGNOSIS — S33.5XXA SPRAIN OF LOW BACK, INITIAL ENCOUNTER: Primary | ICD-10-CM

## 2024-06-17 RX ORDER — METHOCARBAMOL 500 MG/1
500 TABLET, FILM COATED ORAL 3 TIMES DAILY
Qty: 42 TABLET | Refills: 0 | Status: SHIPPED | OUTPATIENT
Start: 2024-06-17 | End: 2024-07-01

## 2024-06-17 NOTE — TELEPHONE ENCOUNTER
Pt and  called into the office pt c/o lower back pain unable to sit for long period of time so unable to come into the office. No one had available apts to schedule virtually. Pt requested muscle relaxer until able to come in on Wednesday with LACI. Notified pt pcp sent robaxin into pharmacy to hold over until apt.

## 2024-06-19 ENCOUNTER — TELEPHONE (OUTPATIENT)
Dept: ADMINISTRATIVE | Facility: OTHER | Age: 84
End: 2024-06-19

## 2024-06-19 ENCOUNTER — OFFICE VISIT (OUTPATIENT)
Dept: FAMILY MEDICINE CLINIC | Facility: CLINIC | Age: 84
End: 2024-06-19
Payer: COMMERCIAL

## 2024-06-19 VITALS
HEIGHT: 62 IN | DIASTOLIC BLOOD PRESSURE: 62 MMHG | SYSTOLIC BLOOD PRESSURE: 144 MMHG | BODY MASS INDEX: 32.24 KG/M2 | WEIGHT: 175.2 LBS | OXYGEN SATURATION: 97 % | TEMPERATURE: 98.2 F | HEART RATE: 60 BPM

## 2024-06-19 DIAGNOSIS — L30.9 DERMATITIS: ICD-10-CM

## 2024-06-19 DIAGNOSIS — S39.012A STRAIN OF LUMBAR REGION, INITIAL ENCOUNTER: Primary | ICD-10-CM

## 2024-06-19 PROCEDURE — G2211 COMPLEX E/M VISIT ADD ON: HCPCS | Performed by: PHYSICIAN ASSISTANT

## 2024-06-19 PROCEDURE — 99213 OFFICE O/P EST LOW 20 MIN: CPT | Performed by: PHYSICIAN ASSISTANT

## 2024-06-19 RX ORDER — CLOBETASOL PROPIONATE 0.5 MG/G
OINTMENT TOPICAL 2 TIMES DAILY
Qty: 30 G | Refills: 1 | Status: SHIPPED | OUTPATIENT
Start: 2024-06-19

## 2024-06-19 RX ORDER — CLOBETASOL PROPIONATE 0.5 MG/G
OINTMENT TOPICAL 2 TIMES DAILY
COMMUNITY
End: 2024-06-19 | Stop reason: SDUPTHER

## 2024-06-19 NOTE — PROGRESS NOTES
Ambulatory Visit  Name: Jolene Segundo      : 1940      MRN: [id number]  Encounter Provider: Aisha Martinez PA-C  Encounter Date: 2024   Encounter department: [facility] PRIMARY CARE    Assessment & Plan   1. Strain of lumbar region, initial encounter  Comments:  Much improved on Robaxin TID and heat. May add tylenol. Would continue until weekend and then use prn. F/U at next scheduled OV.  2. Dermatitis  Assessment & Plan:  Occasionally needs refill of steroid cream for vaginal area given to her by a GYN years ago. Refills given.   Orders:  -     clobetasol (TEMOVATE) 0.05 % ointment; Apply topically 2 (two) times a day     History of Present Illness   {Disappearing Hyperlinks I Encounters * My Last Note * Since Last Visit * History :38318}  Patient presents with:  Back Pain: Pt presents in office with complaints of lower back pain called Monday . Pcp did rx her robaxin to hold her over until her apt today as she was unable to sit for long periods of time without pain Pt states pain has improved sine Monday   Spasms: Back spasms while sitting   Medication Refill: On pending.   Diabetes: Foot exam completed by Dr. Can this past year in [facility] gap message sent       Has been taking nthe robaxin 3 times a day and overall she is much improved from onset of symptoms after closing a lower dresser drawer. Using heat.        Review of Systems   Constitutional: Negative.    HENT: Negative.     Eyes: Negative.    Respiratory: Negative.     Cardiovascular: Negative.    Gastrointestinal: Negative.    Endocrine: Negative.    Genitourinary: Negative.    Musculoskeletal:  Positive for back pain.   Skin: Negative.    Allergic/Immunologic: Negative.    Neurological: Negative.    Hematological: Negative.    Psychiatric/Behavioral: Negative.         Objective   {Disappearing Hyperlinks   Review Vitals * Enter New Vitals * Results Review * Labs * Imaging * Cardiology * Procedures * Lung Cancer  "Screening :77846}  /62 (BP Location: Right arm, Patient Position: Sitting, Cuff Size: Adult)   Pulse 60   Temp 98.2 °F (36.8 °C) (Temporal)   Ht 5' 2\" (1.575 m)   Wt 79.5 kg (175 lb 3.2 oz)   SpO2 97%   BMI 32.04 kg/m²     Physical Exam  Vitals and nursing note reviewed.   Constitutional:       Appearance: Normal appearance. She is well-developed.   HENT:      Head: Normocephalic and atraumatic.   Eyes:      General: Lids are normal.      Conjunctiva/sclera: Conjunctivae normal.      Pupils: Pupils are equal, round, and reactive to light.   Cardiovascular:      Rate and Rhythm: Normal rate and regular rhythm.      Heart sounds: No murmur heard.  Pulmonary:      Effort: Pulmonary effort is normal.      Breath sounds: Normal breath sounds.   Musculoskeletal:      Lumbar back: Tenderness present.        Back:       Comments: Mild tenderness to palpation of the L lumbar musculature. No mid spine zuly pain. Pt is walking very well with use of walker.    Skin:     General: Skin is warm and dry.   Neurological:      General: No focal deficit present.      Mental Status: She is alert.      Coordination: Coordination is intact.   Psychiatric:         Mood and Affect: Mood normal.         Behavior: Behavior normal. Behavior is cooperative.         Thought Content: Thought content normal.         Judgment: Judgment normal.       Administrative Statements {Disappearing Hyperlinks I  [facility]    "

## 2024-06-19 NOTE — LETTER
Diabetic Foot Exam Form    Date Requested: 24  Patient: Jolene Segundo  Patient : 1940   Referring Provider: Aisha Martinez PA-C    Diabetic Foot Exam Performed with shoes and socks removed        Yes         No     Date of Diabetic Foot Exam ______________________________  Risk Score ____________________________________________    Left Foot       Visual Inspection         Monofilament Testing Sensory Exam        Pedal Pulses         Additional Comments         Right Foot      Visual Inspection         Monofilament Testing Sensory Exam       Pedal Pulses         Additional Comments         Comments __________________________________________________________    Practice Providing Exam ______________________________________________    Exam Performed By (print name) _______________________________________      Provider Signature ___________________________________________________      These reports are needed for  compliance.    Please fax this completed form and a copy of the Diabetic Foot Exam report to our office located at [address] [place] [address] as soon as possible via Fax [phone] kizzy Strong: Phone [phone]    We thank you for your assistance in treating our mutual patient.

## 2024-06-19 NOTE — TELEPHONE ENCOUNTER
----- Message from Nancy GENAO sent at 6/19/2024 10:23 AM EDT -----  Regarding: care gap request  06/19/24 10:23 AM    Hello, our patient attached above has had Diabetic Foot Exam completed/performed. Please assist in updating the patient chart by making an External outreach to  facility located in [place]. The date of service is within the past few months .    Thank you,  Nancy Nieves [facility]

## 2024-06-19 NOTE — PATIENT INSTRUCTIONS
1. Strain of lumbar region, initial encounter  Comments:  Much improved on Robaxin TID and heat. May add tylenol. Would continue until weekend and then use prn. F/U at next scheduled OV.  2. Dermatitis  Assessment & Plan:  Occasionally needs refill of steroid cream for vaginal area given to her by a GYN years ago. Refills given.   Orders:  -     clobetasol (TEMOVATE) 0.05 % ointment; Apply topically 2 (two) times a day

## 2024-06-19 NOTE — ASSESSMENT & PLAN NOTE
Occasionally needs refill of steroid cream for vaginal area given to her by a GYN years ago. Refills given.

## 2024-06-19 NOTE — TELEPHONE ENCOUNTER
Upon review of the In Basket request and the patient's chart, initial outreach has been made via fax to facility. Please see Contacts section for details.     Thank you  Ligia Fernandez

## 2024-06-24 NOTE — TELEPHONE ENCOUNTER
As a follow-up, a second attempt has been made for outreach via fax to facility. Please see Contacts section for details.    Thank you  Ligia Fernandez

## 2024-06-26 NOTE — TELEPHONE ENCOUNTER
Upon review of the In Basket request we were able to locate, review, and update the patient chart as requested for Diabetic Foot Exam.    Any additional questions or concerns should be emailed to the Practice Liaisons via the appropriate education email address, please do not reply via In Basket.    Thank you  Ligia Fernandez   [facility]

## 2024-07-05 DIAGNOSIS — R00.0 SINUS TACHYCARDIA: ICD-10-CM

## 2024-07-05 RX ORDER — METOPROLOL TARTRATE 50 MG/1
TABLET, FILM COATED ORAL
Qty: 200 TABLET | Refills: 1 | Status: SHIPPED | OUTPATIENT
Start: 2024-07-05

## 2024-07-22 ENCOUNTER — RA CDI HCC (OUTPATIENT)
Dept: OTHER | Facility: HOSPITAL | Age: 84
End: 2024-07-22

## 2024-07-23 ENCOUNTER — APPOINTMENT (OUTPATIENT)
Dept: LAB | Facility: CLINIC | Age: 84
End: 2024-07-23
Payer: COMMERCIAL

## 2024-07-23 DIAGNOSIS — E11.39 TYPE 2 DIABETES MELLITUS WITH OTHER OPHTHALMIC COMPLICATION, WITHOUT LONG-TERM CURRENT USE OF INSULIN (HCC): ICD-10-CM

## 2024-07-23 DIAGNOSIS — E78.2 MIXED HYPERLIPIDEMIA: ICD-10-CM

## 2024-07-23 LAB
ALBUMIN SERPL BCG-MCNC: 4.1 G/DL (ref 3.5–5)
ALP SERPL-CCNC: 47 U/L (ref 34–104)
ALT SERPL W P-5'-P-CCNC: 12 U/L (ref 7–52)
ANION GAP SERPL CALCULATED.3IONS-SCNC: 8 MMOL/L (ref 4–13)
AST SERPL W P-5'-P-CCNC: 11 U/L (ref 13–39)
BILIRUB SERPL-MCNC: 0.63 MG/DL (ref 0.2–1)
BUN SERPL-MCNC: 17 MG/DL (ref 5–25)
CALCIUM SERPL-MCNC: 10.1 MG/DL (ref 8.4–10.2)
CHLORIDE SERPL-SCNC: 101 MMOL/L (ref 96–108)
CHOLEST SERPL-MCNC: 151 MG/DL
CO2 SERPL-SCNC: 30 MMOL/L (ref 21–32)
CREAT SERPL-MCNC: 0.78 MG/DL (ref 0.6–1.3)
EST. AVERAGE GLUCOSE BLD GHB EST-MCNC: 126 MG/DL
GFR SERPL CREATININE-BSD FRML MDRD: 70 ML/MIN/1.73SQ M
GLUCOSE P FAST SERPL-MCNC: 101 MG/DL (ref 65–99)
HBA1C MFR BLD: 6 %
HDLC SERPL-MCNC: 51 MG/DL
LDLC SERPL CALC-MCNC: 66 MG/DL (ref 0–100)
POTASSIUM SERPL-SCNC: 3.7 MMOL/L (ref 3.5–5.3)
PROT SERPL-MCNC: 6.5 G/DL (ref 6.4–8.4)
SODIUM SERPL-SCNC: 139 MMOL/L (ref 135–147)
TRIGL SERPL-MCNC: 169 MG/DL

## 2024-07-23 PROCEDURE — 80053 COMPREHEN METABOLIC PANEL: CPT

## 2024-07-23 PROCEDURE — 80061 LIPID PANEL: CPT

## 2024-07-23 PROCEDURE — 36415 COLL VENOUS BLD VENIPUNCTURE: CPT

## 2024-07-23 PROCEDURE — 83036 HEMOGLOBIN GLYCOSYLATED A1C: CPT

## 2024-07-25 ENCOUNTER — RA CDI HCC (OUTPATIENT)
Dept: OTHER | Facility: HOSPITAL | Age: 84
End: 2024-07-25

## 2024-07-25 PROBLEM — N17.9 AKI (ACUTE KIDNEY INJURY) (HCC): Status: RESOLVED | Noted: 2023-12-28 | Resolved: 2024-07-25

## 2024-07-25 PROBLEM — U07.1 COVID-19: Status: RESOLVED | Noted: 2023-12-28 | Resolved: 2024-07-25

## 2024-07-25 NOTE — PROGRESS NOTES
HCC coding opportunities          E11.51     Chart Reviewed number of suggestions sent to Provider: 1   GR    Patients Insurance     Medicare Insurance: [employer] Medicare Advantage

## 2024-08-02 ENCOUNTER — OFFICE VISIT (OUTPATIENT)
Dept: FAMILY MEDICINE CLINIC | Facility: CLINIC | Age: 84
End: 2024-08-02
Payer: COMMERCIAL

## 2024-08-02 VITALS
SYSTOLIC BLOOD PRESSURE: 124 MMHG | OXYGEN SATURATION: 97 % | BODY MASS INDEX: 32.31 KG/M2 | WEIGHT: 175.6 LBS | TEMPERATURE: 97 F | DIASTOLIC BLOOD PRESSURE: 66 MMHG | HEART RATE: 63 BPM | HEIGHT: 62 IN

## 2024-08-02 DIAGNOSIS — M85.80 OSTEOPENIA, UNSPECIFIED LOCATION: ICD-10-CM

## 2024-08-02 DIAGNOSIS — M85.80 OSTEOPENIA AFTER MENOPAUSE: ICD-10-CM

## 2024-08-02 DIAGNOSIS — E04.2 MULTIPLE THYROID NODULES: ICD-10-CM

## 2024-08-02 DIAGNOSIS — I10 PRIMARY HYPERTENSION: Primary | ICD-10-CM

## 2024-08-02 DIAGNOSIS — R09.89 BILATERAL CAROTID BRUITS: ICD-10-CM

## 2024-08-02 DIAGNOSIS — N39.3 FEMALE STRESS INCONTINENCE: ICD-10-CM

## 2024-08-02 DIAGNOSIS — E11.311 TYPE 2 DIABETES MELLITUS WITH LEFT EYE AFFECTED BY RETINOPATHY AND MACULAR EDEMA, WITHOUT LONG-TERM CURRENT USE OF INSULIN, UNSPECIFIED RETINOPATHY SEVERITY (HCC): ICD-10-CM

## 2024-08-02 DIAGNOSIS — Z78.0 OSTEOPENIA AFTER MENOPAUSE: ICD-10-CM

## 2024-08-02 DIAGNOSIS — E55.9 VITAMIN D DEFICIENCY: ICD-10-CM

## 2024-08-02 DIAGNOSIS — E78.2 MIXED HYPERLIPIDEMIA: ICD-10-CM

## 2024-08-02 PROBLEM — R31.9 HEMATURIA: Status: RESOLVED | Noted: 2022-12-20 | Resolved: 2024-08-02

## 2024-08-02 PROBLEM — E87.6 HYPOKALEMIA: Status: RESOLVED | Noted: 2022-12-20 | Resolved: 2024-08-02

## 2024-08-02 PROBLEM — E83.52 HYPERCALCEMIA: Status: RESOLVED | Noted: 2021-12-22 | Resolved: 2024-08-02

## 2024-08-02 PROBLEM — I26.99 ACUTE PULMONARY EMBOLISM WITHOUT ACUTE COR PULMONALE (HCC): Status: RESOLVED | Noted: 2022-12-22 | Resolved: 2024-08-02

## 2024-08-02 PROBLEM — R63.4 WEIGHT LOSS: Status: RESOLVED | Noted: 2023-01-13 | Resolved: 2024-08-02

## 2024-08-02 PROBLEM — E87.1 HYPONATREMIA: Status: RESOLVED | Noted: 2023-12-28 | Resolved: 2024-08-02

## 2024-08-02 PROCEDURE — 99214 OFFICE O/P EST MOD 30 MIN: CPT | Performed by: PHYSICIAN ASSISTANT

## 2024-08-02 PROCEDURE — G0439 PPPS, SUBSEQ VISIT: HCPCS | Performed by: PHYSICIAN ASSISTANT

## 2024-08-02 NOTE — PATIENT INSTRUCTIONS
"1. Primary hypertension  Assessment & Plan:  Patient's blood pressure is excellent today on Lopressor 50  2. 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  Continue to observe recheck 6 months.  Lab Results   Component Value Date    HGBA1C 6.0 (H) 07/23/2024     3. Mixed hyperlipidemia  Assessment & Plan:  Patient is on Pravachol 10 keeping her LDL at goal at 66.  Triglycerides only slightly elevated at 169.  No medication specifically needed to treat this at this time.  Recheck 6 months.  4. Vitamin D deficiency  Assessment & Plan:  Check vitamin D with next labs.  5. Multiple thyroid nodules  Assessment & Plan:  Patient has thyroid ultrasound to be done in 1 year from the last.  TSH within normal limits.  6. Female stress incontinence      Patient Education     Urinary incontinence in females   The Basics   Written by the doctors and editors at [facility]   What is urinary incontinence? -- Urinary incontinence is the medical term for when a person leaks urine or loses bladder control.  Incontinence is a very common problem, but it is not a normal part of aging. If you have this problem, there are treatments that can help. There are also things that you can do on your own to stop or reduce urine leakage so you don't have to \"just live with it.\"  What are the symptoms of incontinence? -- There are different types of incontinence. Each causes different symptoms. The 3 most common types are:   Stress incontinence - With stress incontinence, you leak urine when you laugh, cough, sneeze, or do anything that \"stresses\" the belly. Stress incontinence is most common in females, especially those who have had a baby.   Urgency incontinence - With urgency incontinence, you feel a strong need to urinate all of a " "sudden. This is also known as \"urge incontinence.\" Often, the \"urge\" is so strong that you can't make it to the bathroom in time. \"Overactive bladder\" is another term for having a sudden, frequent urge to urinate. People with overactive bladder might or might not actually leak urine.   Mixed incontinence - With mixed incontinence, you have symptoms of both stress and urgency incontinence.  Is there anything I can do on my own to feel better? -- Yes. Here are some things that can help reduce urine leaks:   Reduce the amount of liquid that you drink, especially a few hours before bed.   Cut down on any foods or drinks that make your symptoms worse. Some people find that alcohol, caffeine, or spicy or acidic foods irritate the bladder.   Try to lose weight, if you are overweight. Your doctor or nurse can help you do this in a healthy way.   If you have diabetes, keep your blood sugar as close to your goal level as possible.   If you take medicines called diuretics, plan ahead. These medicines increase the need to urinate. Try to take them when you know you will be near a bathroom for a few hours. If you keep having problems with leakage because of diuretics, ask your doctor if you can take a lower dose or switch to a different medicine.  These techniques can also help improve bladder control:   Bladder retraining - During bladder retraining, you go to the bathroom at scheduled times. For instance, you might decide that you will go every hour. Make yourself go every hour, even if you don't feel like you need to. Try to wait the whole hour, even if you need to go sooner. Then, once you get used to going every hour, increase the amount of time you wait in between bathroom visits. Over time, you might be able to \"retrain\" your bladder to wait 3 or 4 hours between bathroom visits.   Pelvic floor muscle training - This involves learning exercises to strengthen and relax your pelvic muscles. These include the muscles that " control the flow of urine and bowel movements. When done right, these exercises can help. But people often do them wrong. Ask your doctor or nurse how to do them right. Your doctor might suggest working with a physical therapist who has special training in these exercises.  Should I see my doctor or nurse? -- Yes. Your doctor or nurse can find out what might be causing your incontinence. They can also suggest ways to relieve the problem.  When you speak to your doctor or nurse, ask if any of the medicines you take could be causing your symptoms. Some medicines can cause incontinence or make it worse.  Some people choose to wear pads or special underwear. These can help if you accidentally leak urine once in a while. But they can also cause skin irritation if you use them a lot. If you have incontinence, ask your doctor or nurse how to treat it.  How is incontinence treated? -- The treatment options differ depending on what type of incontinence you have. Some of the options include:   Medicines to relax the bladder   Surgery to repair the tissues that support the bladder or to improve the flow of urine   Electrical stimulation of the nerves that relax the bladder  Urinary incontinence is more common in people who have been through menopause. (Menopause is when you stop having monthly periods). Some people have vaginal dryness after menopause. If this is the case for you, a treatment called vaginal estrogen might help.  What will my life be like? -- Many people with incontinence can regain bladder control or at least reduce the amount of leakage they have. The most important thing is to tell your doctor or nurse. Then, work with them to find an approach that helps you.  All topics are updated as new evidence becomes available and our peer review process is complete.  This topic retrieved from [employer] on: Feb 26, 2024.  Topic 78808 Version 18.0  Release: 32.2.4 - C32.56  © 2024 UpToDate, Inc[employer] and/or its  affiliates. All rights reserved.  figure 1: Location of the bladder     This drawing shows the side view of a woman's body. The bladder is in front of the vagina. The urethra is the tube that carries urine from the bladder out of the body.  Graphic 637981 Version 1.0  Consumer Information Use and Disclaimer   Disclaimer: This generalized information is a limited summary of diagnosis, treatment, and/or medication information. It is not meant to be comprehensive and should be used as a tool to help the user understand and/or assess potential diagnostic and treatment options. It does NOT include all information about conditions, treatments, medications, side effects, or risks that may apply to a specific patient. It is not intended to be medical advice or a substitute for the medical advice, diagnosis, or treatment of a health care provider based on the health care provider's examination and assessment of a patient's specific and unique circumstances. Patients must speak with a health care provider for complete information about their health, medical questions, and treatment options, including any risks or benefits regarding use of medications. This information does not endorse any treatments or medications as safe, effective, or approved for treating a specific patient. UpToDate, Inc. and its affiliates disclaim any warranty or liability relating to this information or the use thereof.The use of this information is governed by the Terms of Use, available at https://www.woltersiMegauwer.com/en/know/clinical-effectiveness-terms. 2024© UpToDate, Inc. and its affiliates and/or licensors. All rights reserved.  Copyright   © 2024 UpToDate, Inc. and/or its affiliates. All rights reserved.

## 2024-08-02 NOTE — ASSESSMENT & PLAN NOTE
New today. Could be tranferred from heart. ECHO done in the past. No murmur heard today but has had in the past. Check Carotid US lety.

## 2024-08-02 NOTE — ASSESSMENT & PLAN NOTE
Patient continues off medication for this diagnosis A1c remains very stable at 6.  Continue to observe recheck 6 months.  Lab Results   Component Value Date    HGBA1C 6.0 (H) 07/23/2024

## 2024-08-02 NOTE — PROGRESS NOTES
Ambulatory Visit  Name: Jolene Segundo      : 1940      MRN: [id number]  Encounter Provider: Aisha Martinez PA-C  Encounter Date: 2024   Encounter department: [facility]    Assessment & Plan   1. Primary hypertension  Assessment & Plan:  Patient's blood pressure is excellent today on Lopressor 50  Orders:  -     CBC and differential; Future; Expected date: 2025  2. 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  Continue to observe recheck 6 months.  Lab Results   Component Value Date    HGBA1C 6.0 (H) 2024     Orders:  -     Hemoglobin A1C; Future; Expected date: 2025  -     Comprehensive metabolic panel; Future; Expected date: 2025  3. Mixed hyperlipidemia  Assessment & Plan:  Patient is on Pravachol 10 keeping her LDL at goal at 66.  Triglycerides only slightly elevated at 169.  No medication specifically needed to treat this at this time.  Recheck 6 months.  Orders:  -     Lipid Panel with Direct LDL reflex; Future; Expected date: 2025  4. Vitamin D deficiency  Assessment & Plan:  Check vitamin D with next labs.  Orders:  -     Vitamin D 25 hydroxy; Future; Expected date: 2025  5. Multiple thyroid nodules  Assessment & Plan:  Patient has thyroid ultrasound to be done in 1 year from the last.  TSH within normal limits.  6. Female stress incontinence  7. Osteopenia, unspecified location  -     DXA bone density spine hip and pelvis; Future; Expected date: 2024  8. Osteopenia after menopause  -     DXA bone density spine hip and pelvis; Future; Expected date: 2024  9. Bilateral carotid bruits  Assessment & Plan:  New today. Could be tranferred from heart. ECHO done in the past. No murmur heard today but has had in the  past. Check Carotid US lety.   Orders:  -     VAS carotid complete study; Future; Expected date: 08/02/2024    Depression Screening and Follow-up Plan: Patient was screened for depression during today's encounter. They screened negative with a PHQ-2 score of 0.    Urinary Incontinence Plan of Care: counseling topics discussed: practice Kegel (pelvic floor strengthening) exercises.       Preventive health issues were discussed with patient, and age appropriate screening tests were ordered as noted in patient's After Visit Summary. Personalized health advice and appropriate referrals for health education or preventive services given if needed, as noted in patient's After Visit Summary.    History of Present Illness       Jolene Segundo is here for chronic conditions f/u. Pt. had labs done prior to today's visit which included Recent Results (from the past 672 hour(s))  -Hemoglobin A1C:   Collection Time: 07/23/24  8:41 AM       Result                      Value             Ref Range           Hemoglobin A1C              6.0 (H)           Normal 4.0-5*       EAG                         126               mg/dl          -Comprehensive metabolic panel:   Collection Time: 07/23/24  8:41 AM       Result                      Value             Ref Range           Sodium                      139               135 - 147 mm*       Potassium                   3.7               3.5 - 5.3 mm*       Chloride                    101               96 - 108 mmo*       CO2                         30                21 - 32 mmol*       ANION GAP                   8                 4 - 13 mmol/L       BUN                         17                5 - 25 mg/dL        Creatinine                  0.78              0.60 - 1.30 *       Glucose, Fasting            101 (H)           65 - 99 mg/dL       Calcium                     10.1              8.4 - 10.2 m*       AST                         11 (L)            13 - 39 U/L         ALT                          12                7 - 52 U/L          Alkaline Phosphatase        47                34 - 104 U/L        Total Protein               6.5               6.4 - 8.4 g/*       Albumin                     4.1               3.5 - 5.0 g/*       Total Bilirubin             0.63              0.20 - 1.00 *       eGFR                        70                ml/min/1.73s*  -Lipid Panel with Direct LDL reflex:   Collection Time: 07/23/24  8:41 AM       Result                      Value             Ref Range           Cholesterol                 151               See Comment *       Triglycerides               169 (H)           See Comment *       HDL, Direct                 51                >=50 mg/dL          LDL Calculated              66                0 - 100 mg/dL         Patient Care Team:  Aisha Martinez PA-C as PCP - General (Family Medicine)  Sandhya Gonsales MD (Inactive) (Gynecology)  Flo Can DPM (Podiatry)  Massimo Whitaker MD (Ophthalmology)    Review of Systems   Constitutional: Negative.    HENT: Negative.     Eyes: Negative.    Respiratory: Negative.     Cardiovascular: Negative.    Gastrointestinal: Negative.    Endocrine: Negative.    Genitourinary: Negative.    Musculoskeletal: Negative.    Skin: Negative.    Allergic/Immunologic: Negative.    Neurological: Negative.    Hematological: Negative.    Psychiatric/Behavioral: Negative.       Medical History Reviewed by provider this encounter:       Annual Wellness Visit Questionnaire   Jolene is here for her Subsequent Wellness visit.     Health Risk Assessment:   Patient rates overall health as very good. Patient feels that their physical health rating is same. Patient is very satisfied with their life. Eyesight was rated as same. Hearing was rated as same. Patient feels that their emotional and mental health rating is same. Patients states they are never, rarely angry. Patient states they are often unusually tired/fatigued. Pain experienced in the  last 7 days has been none. Patient states that she has experienced no weight loss or gain in last 6 months.     Depression Screening:   PHQ-2 Score: 0      Fall Risk Screening:   In the past year, patient has experienced: no history of falling in past year      Urinary Incontinence Screening:   Patient has leaked urine accidently in the last six months.     Home Safety:  Patient has trouble with stairs inside or outside of their home. Patient has working smoke alarms and has working carbon monoxide detector. Home safety hazards include: none.     Nutrition:   Current diet is Diabetic.     Medications:   Patient is currently taking over-the-counter supplements. OTC medications include: see medication list. Patient is able to manage medications.     Activities of Daily Living (ADLs)/Instrumental Activities of Daily Living (IADLs):   Walk and transfer into and out of bed and chair?: Yes  Dress and groom yourself?: Yes    Bathe or shower yourself?: Yes    Feed yourself? Yes  Do your laundry/housekeeping?: Yes  Manage your money, pay your bills and track your expenses?: Yes  Make your own meals?: Yes    Do your own shopping?: Yes    Previous Hospitalizations:   Any hospitalizations or ED visits within the last 12 months?: Yes    How many hospitalizations have you had in the last year?: 1-2    Advance Care Planning:   Living will: Yes    Advanced directive: Yes    Advanced directive counseling given: Yes      Cognitive Screening:   Provider or family/friend/caregiver concerned regarding cognition?: No    PREVENTIVE SCREENINGS      Cardiovascular Screening:    General: Screening Not Indicated, History Lipid Disorder and Screening Current      Diabetes Screening:     General: Screening Not Indicated, History Diabetes and Screening Current      Colorectal Cancer Screening:     General: Screening Not Indicated      Breast Cancer Screening:     General: Screening Current      Cervical Cancer Screening:    General: Screening  "Not Indicated      Osteoporosis Screening:    General: Screening Current      Abdominal Aortic Aneurysm (AAA) Screening:        General: Screening Not Indicated      Lung Cancer Screening:     General: Screening Not Indicated      Hepatitis C Screening:    General: Screening Not Indicated    Screening, Brief Intervention, and Referral to Treatment (SBIRT)    Screening    Typical number of drinks in a week: 0    Single Item Drug Screening:  How often have you used an illegal drug (including marijuana) or a prescription medication for non-medical reasons in the past year? never    Single Item Drug Screen Score: 0  Interpretation: Negative screen for possible drug use disorder    Social Determinants of Health     Financial Resource Strain: Low Risk  (7/21/2023)    Overall Financial Resource Strain (CARDIA)    • Difficulty of Paying Living Expenses: Not very hard   Food Insecurity: No Food Insecurity (8/2/2024)    Hunger Vital Sign    • Worried About Running Out of Food in the Last Year: Never true    • Ran Out of Food in the Last Year: Never true   Transportation Needs: No Transportation Needs (8/2/2024)    PRAPARE - Transportation    • Lack of Transportation (Medical): No    • Lack of Transportation (Non-Medical): No   Housing Stability: Low Risk  (8/2/2024)    Housing Stability Vital Sign    • Unable to Pay for Housing in the Last Year: No    • Number of Times Moved in the Last Year: 0    • Homeless in the Last Year: No   Utilities: Not At Risk (8/2/2024)    [facility] Utilities    • Threatened with loss of utilities: No     No results found.    Objective     /66 (BP Location: Right arm, Patient Position: Sitting, Cuff Size: Standard)   Pulse 63   Temp (!) 97 °F (36.1 °C) (Tympanic)   Ht 5' 2\" (1.575 m)   Wt 79.7 kg (175 lb 9.6 oz)   SpO2 97%   BMI 32.12 kg/m²     Physical Exam  Vitals and nursing note reviewed.   Constitutional:       Appearance: Normal appearance. She is well-developed.   HENT:      Head: " Normocephalic and atraumatic.   Eyes:      General: Lids are normal.      Conjunctiva/sclera: Conjunctivae normal.      Pupils: Pupils are equal, round, and reactive to light.   Cardiovascular:      Rate and Rhythm: Normal rate and regular rhythm.      Heart sounds: No murmur heard.  Pulmonary:      Effort: Pulmonary effort is normal.      Breath sounds: Normal breath sounds.   Skin:     General: Skin is warm and dry.   Neurological:      General: No focal deficit present.      Mental Status: She is alert.      Coordination: Coordination is intact.   Psychiatric:         Mood and Affect: Mood normal.         Behavior: Behavior normal. Behavior is cooperative.         Thought Content: Thought content normal.         Judgment: Judgment normal.

## 2024-08-02 NOTE — ASSESSMENT & PLAN NOTE
Patient is on Pravachol 10 keeping her LDL at goal at 66.  Triglycerides only slightly elevated at 169.  No medication specifically needed to treat this at this time.  Recheck 6 months.

## 2024-08-23 ENCOUNTER — HOSPITAL ENCOUNTER (OUTPATIENT)
Dept: NON INVASIVE DIAGNOSTICS | Facility: HOSPITAL | Age: 84
Discharge: HOME | End: 2024-08-23
Payer: COMMERCIAL

## 2024-08-23 DIAGNOSIS — R09.89 BILATERAL CAROTID BRUITS: ICD-10-CM

## 2024-08-23 PROCEDURE — 93880 EXTRACRANIAL BILAT STUDY: CPT

## 2024-08-23 PROCEDURE — 93880 EXTRACRANIAL BILAT STUDY: CPT | Performed by: SURGERY

## 2024-08-24 DIAGNOSIS — R09.89 BILATERAL CAROTID BRUITS: Primary | ICD-10-CM

## 2024-08-24 NOTE — RESULT ENCOUNTER NOTE
Please let pt know her carotid US shows only less than 50% occlusion which is normal for age. No treatment needed but repeat US ordered to be done in 2 years. Thank you.

## 2024-08-26 DIAGNOSIS — I10 ESSENTIAL HYPERTENSION: ICD-10-CM

## 2024-08-26 DIAGNOSIS — Z01.818 PREOP GENERAL PHYSICAL EXAM: ICD-10-CM

## 2024-08-26 DIAGNOSIS — Z01.818 PREOP TESTING: ICD-10-CM

## 2024-08-27 RX ORDER — HYDROCHLOROTHIAZIDE 25 MG/1
25 TABLET ORAL DAILY
Qty: 90 TABLET | Refills: 1 | Status: SHIPPED | OUTPATIENT
Start: 2024-08-27

## 2024-09-02 ENCOUNTER — TELEPHONE (OUTPATIENT)
Dept: OTHER | Facility: OTHER | Age: 84
End: 2024-09-02

## 2024-09-02 ENCOUNTER — APPOINTMENT (EMERGENCY)
Dept: CT IMAGING | Facility: HOSPITAL | Age: 84
End: 2024-09-02
Payer: COMMERCIAL

## 2024-09-02 ENCOUNTER — HOSPITAL ENCOUNTER (EMERGENCY)
Facility: HOSPITAL | Age: 84
Discharge: HOME | End: 2024-09-02
Attending: EMERGENCY MEDICINE
Payer: COMMERCIAL

## 2024-09-02 ENCOUNTER — APPOINTMENT (EMERGENCY)
Dept: RADIOLOGY | Facility: HOSPITAL | Age: 84
End: 2024-09-02
Payer: COMMERCIAL

## 2024-09-02 VITALS
SYSTOLIC BLOOD PRESSURE: 140 MMHG | DIASTOLIC BLOOD PRESSURE: 93 MMHG | HEART RATE: 67 BPM | TEMPERATURE: 98.5 F | RESPIRATION RATE: 18 BRPM | OXYGEN SATURATION: 97 %

## 2024-09-02 DIAGNOSIS — M79.651 RIGHT THIGH PAIN: ICD-10-CM

## 2024-09-02 DIAGNOSIS — M79.641 BILATERAL HAND PAIN: ICD-10-CM

## 2024-09-02 DIAGNOSIS — M79.642 BILATERAL HAND PAIN: ICD-10-CM

## 2024-09-02 DIAGNOSIS — S01.511A LIP LACERATION, INITIAL ENCOUNTER: ICD-10-CM
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
## 2024-09-02 PROCEDURE — 70486 CT MAXILLOFACIAL W/O DYE: CPT

## 2024-09-02 PROCEDURE — 40650 RPR LIP FTH VERMILION ONLY: CPT | Performed by: EMERGENCY MEDICINE

## 2024-09-02 PROCEDURE — 73564 X-RAY EXAM KNEE 4 OR MORE: CPT

## 2024-09-02 PROCEDURE — 73130 X-RAY EXAM OF HAND: CPT

## 2024-09-02 PROCEDURE — 99284 EMERGENCY DEPT VISIT MOD MDM: CPT | Performed by: EMERGENCY MEDICINE

## 2024-09-02 PROCEDURE — 99284 EMERGENCY DEPT VISIT MOD MDM: CPT

## 2024-09-02 PROCEDURE — 73552 X-RAY EXAM OF FEMUR 2/>: CPT

## 2024-09-02 PROCEDURE — 70450 CT HEAD/BRAIN W/O DYE: CPT

## 2024-09-02 PROCEDURE — 90471 IMMUNIZATION ADMIN: CPT

## 2024-09-02 PROCEDURE — 90715 TDAP VACCINE 7 YRS/> IM: CPT | Performed by: PHYSICIAN ASSISTANT

## 2024-09-02 PROCEDURE — 72125 CT NECK SPINE W/O DYE: CPT

## 2024-09-02 RX ORDER — LIDOCAINE HYDROCHLORIDE 10 MG/ML
10 INJECTION, SOLUTION EPIDURAL; INFILTRATION; INTRACAUDAL; PERINEURAL ONCE
Status: COMPLETED | OUTPATIENT
Start: 2024-09-02 | End: 2024-09-02

## 2024-09-02 RX ORDER — LIDOCAINE HYDROCHLORIDE 20 MG/ML
15 SOLUTION OROPHARYNGEAL ONCE
Status: COMPLETED | OUTPATIENT
Start: 2024-09-02 | End: 2024-09-02

## 2024-09-02 RX ORDER — LIDOCAINE HYDROCHLORIDE AND EPINEPHRINE 10; 10 MG/ML; UG/ML
10 INJECTION, SOLUTION INFILTRATION; PERINEURAL ONCE
Status: COMPLETED | OUTPATIENT
Start: 2024-09-02 | End: 2024-09-02

## 2024-09-02 RX ADMIN — TETANUS TOXOID, REDUCED DIPHTHERIA TOXOID AND ACELLULAR PERTUSSIS VACCINE, ADSORBED 0.5 ML: 5; 2.5; 8; 8; 2.5 SUSPENSION INTRAMUSCULAR at 11:55

## 2024-09-02 RX ADMIN — LIDOCAINE HYDROCHLORIDE,EPINEPHRINE BITARTRATE 10 ML: 10; .01 INJECTION, SOLUTION INFILTRATION; PERINEURAL at 11:55

## 2024-09-02 RX ADMIN — LIDOCAINE HYDROCHLORIDE 15 ML: 20 SOLUTION ORAL at 12:56

## 2024-09-02 RX ADMIN — LIDOCAINE HYDROCHLORIDE 10 ML: 10 INJECTION, SOLUTION EPIDURAL; INFILTRATION; INTRACAUDAL at 11:57

## 2024-09-02 NOTE — DISCHARGE INSTRUCTIONS
Please refer to the attached information for strict return instructions. If symptoms worsen or new symptoms develop please return to the ER. Follow up in 5-7 days for suture removal through primary care physician or urgent care.

## 2024-09-02 NOTE — ED PROVIDER NOTES
History  Chief Complaint   Patient presents with    Fall     Witnessed trip and fall onto concrete getting out of car this morning. Facial strike with R upper lip laceration. R thigh pain. Aox4. No AC     Jolene is an 85 yo F presenting after fall occurring shortly prior to arrival. She apparently tripped on a sidewalk curb falling forwards. She partially caught herself with her hands but subsequently struck her R upper lip on the ground causing a laceration. No LOC. No generalized headache/neck/back pain. No thinners/aspirin.     Reports mild pain to bilateral hands particularly over distal third fingers. She also reports slight pain to posterior R thigh just above knee. Notes some discomfort in this area although able to ambulate.       History provided by:  Patient   used: No        Prior to Admission Medications   Prescriptions Last Dose Informant Patient Reported? Taking?   ALPRAZolam (XANAX) 0.25 mg tablet  Self No No   Sig: Take 1 tablet (0.25 mg total) by mouth daily as needed for anxiety   Cholecalciferol (VITAMIN D) 2000 units CAPS  Self Yes No   Sig: Take 1 capsule by mouth 2 (two) times a day   ascorbic acid (VITAMIN C) 250 mg tablet  Self Yes No   Sig: Take 500 mg by mouth daily   clobetasol (TEMOVATE) 0.05 % ointment  Self No No   Sig: Apply topically 2 (two) times a day   hydroCHLOROthiazide 25 mg tablet   No No   Sig: TAKE 1 TABLET (25 MG TOTAL) BY MOUTH DAILY.   latanoprost (XALATAN) 0.005 % ophthalmic solution  Self Yes No   methocarbamol (ROBAXIN) 500 mg tablet   No No   Sig: Take 1 tablet (500 mg total) by mouth 3 (three) times a day for 14 days   metoprolol tartrate (LOPRESSOR) 50 mg tablet  Self No No   Sig: TAKE 1 TABLET BY MOUTH EVERY 12 HOURS   nystatin (MYCOSTATIN) cream  Self No No   Sig: Apply topically 2 (two) times a day   pravastatin (PRAVACHOL) 10 mg tablet  Self No No   Sig: TAKE 1 TABLET BY MOUTH EVERYDAY AT BEDTIME      Facility-Administered Medications: None        Past Medical History:   Diagnosis Date    Acute pulmonary embolism without acute cor pulmonale (HCC) 12/22/2022    KAROLINA (acute kidney injury) (HCC) 12/28/2023    COVID-19 12/28/2023    Diabetes mellitus (HCC)     Hemorrhoids     Hyperlipidemia     Hypertension     ovarian     Pseudopolyposis of colon without complication, unspecified part of colon (HCC) 07/01/2022       Past Surgical History:   Procedure Laterality Date    BREAST BIOPSY      Bx breast percutan needle core use image guide (stereotactic)    CATARACT EXTRACTION      COLONOSCOPY      HEMORROIDECTOMY      CO LIGATION/BIOPSY TEMPORAL ARTERY Bilateral 10/17/2022    Procedure: BIOPSY ARTERY TEMPORAL;  Surgeon: Mary Eldridge DO;  Location: [facility];  Service: Vascular    TOTAL ABDOMINAL HYSTERECTOMY         Family History   Problem Relation Age of Onset    Heart disease Mother     Lung cancer Mother     Uterine cancer Mother     Heart disease Father     Breast cancer Sister     Diabetes Maternal Grandmother      I have reviewed and agree with the history as documented.    E-Cigarette/Vaping    E-Cigarette Use Never User      E-Cigarette/Vaping Substances    Nicotine No     THC No     CBD No      Social History     Tobacco Use    Smoking status: Never    Smokeless tobacco: Never   Vaping Use    Vaping status: Never Used   Substance Use Topics    Alcohol use: Not Currently     Comment: rarely    Drug use: Never       Review of Systems   Constitutional:  Negative for chills and fever.   HENT:  Negative for congestion, rhinorrhea and sore throat.    Eyes:  Negative for pain and visual disturbance.   Respiratory:  Negative for cough, shortness of breath and wheezing.    Cardiovascular:  Negative for chest pain and palpitations.   Gastrointestinal:  Negative for abdominal pain, nausea and vomiting.   Genitourinary:  Negative for dysuria, frequency and urgency.   Musculoskeletal:  Positive for arthralgias. Negative for back pain, neck pain and neck  stiffness.   Skin:  Positive for wound. Negative for rash.   Neurological:  Negative for dizziness, weakness, light-headedness and numbness.       Physical Exam  Physical Exam  Constitutional:       General: She is not in acute distress.     Appearance: She is well-developed. She is not diaphoretic.   HENT:      Head: Normocephalic.      Right Ear: External ear normal.      Left Ear: External ear normal.      Nose: Nose normal.      Right Nostril: No septal hematoma.      Left Nostril: No septal hematoma.      Mouth/Throat:        Comments: Laceration to R upper lip extending through portion of muscle layer and through mucosal surface. Lac involving vermilion border. See accompanying clinical image.     Dentition appears intact. No evidence of dental fractures/loose dentition on exam  Eyes:      Extraocular Movements:      Right eye: Normal extraocular motion.      Left eye: Normal extraocular motion.      Conjunctiva/sclera: Conjunctivae normal.      Pupils: Pupils are equal, round, and reactive to light.   Cardiovascular:      Rate and Rhythm: Normal rate and regular rhythm.   Pulmonary:      Effort: Pulmonary effort is normal. No accessory muscle usage or respiratory distress.   Abdominal:      General: Abdomen is flat. There is no distension.   Musculoskeletal:      Cervical back: Normal range of motion. No rigidity.      Comments: No midline C/T/L TTP. Normal ROM b/l upper and lower extremities. Does note some pain with palpation of distal third fingers bilaterally. Also reporting pain to R thigh with full extension of knee and slightly with palpation over hamstring. No bruising/gross deformity to area. Able to ambulate without significant difficulty.    Skin:     General: Skin is warm and dry.      Capillary Refill: Capillary refill takes less than 2 seconds.      Findings: No erythema or rash.   Neurological:      Mental Status: She is alert and oriented to person, place, and time.      Motor: No abnormal  muscle tone.      Coordination: Coordination normal.   Psychiatric:         Behavior: Behavior normal.         Thought Content: Thought content normal.         Judgment: Judgment normal.         Vital Signs  ED Triage Vitals   Temperature Pulse Respirations Blood Pressure SpO2   09/02/24 1109 09/02/24 1109 09/02/24 1109 09/02/24 1110 09/02/24 1109   98.5 °F (36.9 °C) 67 20 (!) 193/73 96 %      Temp Source Heart Rate Source Patient Position - Orthostatic VS BP Location FiO2 (%)   09/02/24 1109 09/02/24 1109 09/02/24 1109 09/02/24 1109 --   Oral Monitor Sitting Right arm       Pain Score       09/02/24 1109       3           Vitals:    09/02/24 1109 09/02/24 1110 09/02/24 1332   BP:  (!) 193/73 140/93   Pulse: 67  67   Patient Position - Orthostatic VS: Sitting Sitting Sitting         Visual Acuity      ED Medications  Medications   tetanus-diphtheria-acellular pertussis (BOOSTRIX) IM injection 0.5 mL (0.5 mL Intramuscular Given 9/2/24 1155)   lidocaine-epinephrine (XYLOCAINE/EPINEPHRINE) 1 %-1:100,000 injection 10 mL (10 mL Infiltration Given by Other 9/2/24 1155)   lidocaine (PF) (XYLOCAINE-MPF) 1 % injection 10 mL (10 mL Infiltration Given by Other 9/2/24 1157)   Lidocaine Viscous HCl (XYLOCAINE) 2 % mucosal solution 15 mL (15 mL Swish & Spit Given 9/2/24 1256)       Diagnostic Studies  Results Reviewed       None                   CT head without contrast   Final Result by Radhames De Jesus MD (09/02 1325)      No acute intracranial CT abnormality.                  Workstation performed: [id number]         CT facial bones without contrast   Final Result by Radhames De Jesus MD (09/02 1327)      No acute osseous abnormality.               Workstation performed: [id number]         CT spine cervical without contrast   Final Result by Radhames De Jesus MD (09/02 1328)      No acute cervical spine fracture or traumatic malalignment.                  Workstation performed: [id number]         XR knee 4+ vw right injury   Final Result by  "Farhat Galindo MD (09/02 1355)      No acute osseous abnormality.         Computerized Assisted Algorithm (CAA) may have been used to analyze all applicable images.         Workstation performed: [id number]         XR femur 2 views RIGHT   Final Result by Farhat Galindo MD (09/02 5726)      No acute osseous abnormality.         Computerized Assisted Algorithm (CAA) may have been used to analyze all applicable images.         Workstation performed: [id number]         XR hand 3+ views RIGHT   Final Result by Farhat Galindo MD (09/02 1357)      No acute osseous abnormality.      Degenerative changes as described.      Computerized Assisted Algorithm (CAA) may have been used to analyze all applicable images.         Workstation performed: [id number]         XR hand 3+ views LEFT   Final Result by Farhat Galindo MD (09/02 5125)      No acute osseous abnormality.      Degenerative changes as described.         Computerized Assisted Algorithm (CAA) may have been used to analyze all applicable images.         Workstation performed: [id number]                    Procedures  Universal Protocol:  procedure performed by consultantConsent: Verbal consent obtained.  Risks and benefits: risks, benefits and alternatives were discussed  Consent given by: patient  Time out: Immediately prior to procedure a \"time out\" was called to verify the correct patient, procedure, equipment, support staff and site/side marked as required.  Patient understanding: patient states understanding of the procedure being performed  Patient consent: the patient's understanding of the procedure matches consent given  Required items: required blood products, implants, devices, and special equipment available  Patient identity confirmed: arm band  Laceration repair    Date/Time: 9/2/2024 12:30 PM    Performed by: Jesus Mcwilliams PA-C  Authorized by: Jesus Mcwilliams PA-C  Body area: head/neck  Location details: upper " - - - lip  Full thickness lip laceration: yes  Vermilion border involved: yes  Lip laceration height: more than half vertical height  Wound length (cm): 1-1.5 cm externally, similar length internal.  Foreign bodies: no foreign bodies  Anesthesia: nerve block (inferior alveolar block)    Anesthesia:  Local Anesthetic: lidocaine 1% with epinephrine  Anesthetic total: 2 mL    Sedation:  Patient sedated: no        Procedure Details:  Preparation: Patient was prepped and draped in the usual sterile fashion.  Irrigation solution: saline  Irrigation method: syringe  Amount of cleaning: standard  Debridement: minimal  Degree of undermining: none  Skin closure: 5-0 nylon  Mucous membrane closure: 4-0 Chromic gut  Subcutaneous closure: 4-0 Chromic gut  Number of sutures: 4 chromic gut in subcutaneous tissue/muscle layer, 2 nylon externally, 4 chromic gut mucosal surface.  Approximation: close  Approximation difficulty: complex  Lip approximation: vermillion border well aligned  Patient tolerance: patient tolerated the procedure well with no immediate complications               ED Course                                 SBIRT 22yo+      Flowsheet Row Most Recent Value   Initial Alcohol Screen: US AUDIT-C     1. How often do you have a drink containing alcohol? 0 Filed at: 09/02/2024 1126   2. How many drinks containing alcohol do you have on a typical day you are drinking?  0 Filed at: 09/02/2024 1126   3a. Male UNDER 65: How often do you have five or more drinks on one occasion? 0 Filed at: 09/02/2024 1126   3b. FEMALE Any Age, or MALE 65+: How often do you have 4 or more drinks on one occassion? 0 Filed at: 09/02/2024 1126   Audit-C Score 0 Filed at: 09/02/2024 1126   SAL: How many times in the past year have you...    Used an illegal drug or used a prescription medication for non-medical reasons? Never Filed at: 09/02/2024 1126                      Medical Decision Making  Patient presenting with laceration to R upper lip as  well as slight pain to b/l hands, R thigh after mechanical fall shortly prior to arrival. Mild pain to distal third fingers b/l, no acute fractures on my initial xray read of area. Notes some pain to posterior R thigh - still able to ambulate. Normal ROM of hip/knee. Suspect hamstring strain. CT head/neck, facial bones without acute abnormality.     Wound repaired as per procedures section following inferior alveolar block. Wound care instructions and suture removal times discussed with patient. Given depth of injury/age will recommend removal of nylon sutures in 5-7 days as opposed to usual 4-5 days for facial lacerations. Strict return indications reviewed.     Amount and/or Complexity of Data Reviewed  Radiology: ordered.    Risk  Prescription drug management.                 Disposition  Final diagnoses:   Fall, initial encounter   Lip laceration, initial encounter   Bilateral hand pain   Right thigh pain     Time reflects when diagnosis was documented in both MDM as applicable and the Disposition within this note       Time User Action Codes Description Comment    9/2/2024  1:32 PM Jesus Mcwilliams Add [W19.XXXA] Fall, initial encounter     9/2/2024  1:32 PM Jesus Mcwilliams Add [S01.511A] Lip laceration, initial encounter     9/2/2024  1:32 PM Jesus Mcwilliams Add [M79.641,  M79.642] Bilateral hand pain     9/2/2024  1:33 PM Jesus Mcwilliams Add [M79.651] Right thigh pain           ED Disposition       ED Disposition   Discharge    Condition   Stable    Date/Time   Mon Sep 2, 2024 1332    Comment   Jolene Segundo discharge to home/self care.                   Follow-up Information       Follow up With Specialties Details Why Contact Info Additional Information    [facility] Emergency Department Emergency Medicine  If symptoms worsen [address] [place] [id number]  [phone] [facility] Emergency Department, [address] [place] [address],  [id number]    [facility] [place] [facility] Schedule an appointment as soon as possible for a visit   [address]  [address]  [facility] [address] [phone] [facility] [place], [facility] [address], [place], [place], [address] [phone]            Discharge Medication List as of 9/2/2024  1:34 PM        CONTINUE these medications which have NOT CHANGED    Details   ALPRAZolam (XANAX) 0.25 mg tablet Take 1 tablet (0.25 mg total) by mouth daily as needed for anxiety, Starting Thu 10/27/2022, Normal      ascorbic acid (VITAMIN C) 250 mg tablet Take 500 mg by mouth daily, Historical Med      Cholecalciferol (VITAMIN D) 2000 units CAPS Take 1 capsule by mouth 2 (two) times a day, Starting Fri 6/5/2015, Historical Med      clobetasol (TEMOVATE) 0.05 % ointment Apply topically 2 (two) times a day, Starting Wed 6/19/2024, Normal      hydroCHLOROthiazide 25 mg tablet TAKE 1 TABLET (25 MG TOTAL) BY MOUTH DAILY., Starting Tue 8/27/2024, Normal      latanoprost (XALATAN) 0.005 % ophthalmic solution Starting Wed 12/7/2022, Historical Med      methocarbamol (ROBAXIN) 500 mg tablet Take 1 tablet (500 mg total) by mouth 3 (three) times a day for 14 days, Starting Mon 6/17/2024, Until Mon 7/1/2024, Normal      metoprolol tartrate (LOPRESSOR) 50 mg tablet TAKE 1 TABLET BY MOUTH EVERY 12 HOURS, Normal      nystatin (MYCOSTATIN) cream Apply topically 2 (two) times a day, Starting Mon 12/18/2023, Normal      pravastatin (PRAVACHOL) 10 mg tablet TAKE 1 TABLET BY MOUTH EVERYDAY AT BEDTIME, Normal                 PDMP Review         Value Time User    PDMP Reviewed  Yes 10/27/2022  3:12 PM Raman Monson MD            ED Provider  Electronically Signed by             Jesus Mcwilliams PA-C  09/02/24 [id number]

## 2024-09-02 NOTE — TELEPHONE ENCOUNTER
PT's  called in to notify that PT has some stitches in her lip and was referred by ED doc to have a plastic surgeon look at it. Please call PT back to set up an appointment.

## 2024-09-09 ENCOUNTER — TELEPHONE (OUTPATIENT)
Dept: PLASTIC SURGERY | Facility: CLINIC | Age: 84
End: 2024-09-09

## 2024-09-09 ENCOUNTER — OFFICE VISIT (OUTPATIENT)
Dept: URGENT CARE | Facility: MEDICAL CENTER | Age: 84
End: 2024-09-09
Payer: COMMERCIAL

## 2024-09-09 VITALS
TEMPERATURE: 97.9 F | HEART RATE: 75 BPM | RESPIRATION RATE: 18 BRPM | SYSTOLIC BLOOD PRESSURE: 169 MMHG | OXYGEN SATURATION: 96 % | DIASTOLIC BLOOD PRESSURE: 70 MMHG

## 2024-09-09 DIAGNOSIS — Z48.02 ENCOUNTER FOR REMOVAL OF SUTURES: Primary | ICD-10-CM

## 2024-09-09 PROCEDURE — 99211 OFF/OP EST MAY X REQ PHY/QHP: CPT | Performed by: PHYSICIAN ASSISTANT

## 2024-09-09 NOTE — TELEPHONE ENCOUNTER
Spoke to pt about the referral received from provider. Pt wants to wait and see if needs an appointment and will call back if they choose to move forward. They know the referral is good for one year.

## 2024-09-09 NOTE — PROGRESS NOTES
[facility]        NAME: Jolene Segundo is a 84 y.o. female  : 1940    MRN: [id number]  DATE: 2024  TIME: 12:22 PM    Assessment and Plan   Encounter for removal of sutures [Z48.02]  1. Encounter for removal of sutures              Patient Instructions       Follow up with PCP in 3-5 days.  Proceed to  ER if symptoms worsen.    If tests have been performed at [facility], our office will contact you with results if changes need to be made to the care plan discussed with you at the visit.  You can review your full results on [facility].    Chief Complaint     Chief Complaint   Patient presents with    Suture / Staple Removal     Patient here for a suture removal on her upper lip.          History of Present Illness       Suture / Staple Removal  The sutures were placed 7 to 10 days ago. She tried nothing since the wound repair. The treatment provided significant relief. Her temperature was unmeasured prior to arrival. There has been no drainage from the wound. There is no redness present. There is no swelling present. There is no pain present.       Review of Systems   Review of Systems   All other systems reviewed and are negative.        Current Medications       Current Outpatient Medications:     ALPRAZolam (XANAX) 0.25 mg tablet, Take 1 tablet (0.25 mg total) by mouth daily as needed for anxiety, Disp: 10 tablet, Rfl: 0    ascorbic acid (VITAMIN C) 250 mg tablet, Take 500 mg by mouth daily, Disp: , Rfl:     Cholecalciferol (VITAMIN D) 2000 units CAPS, Take 1 capsule by mouth 2 (two) times a day, Disp: , Rfl:     clobetasol (TEMOVATE) 0.05 % ointment, Apply topically 2 (two) times a day, Disp: 30 g, Rfl: 1    hydroCHLOROthiazide 25 mg tablet, TAKE 1 TABLET (25 MG TOTAL) BY MOUTH DAILY., Disp: 90 tablet, Rfl: 1    latanoprost (XALATAN) 0.005 % ophthalmic solution, , Disp: , Rfl:     methocarbamol (ROBAXIN) 500 mg tablet, Take 1 tablet (500 mg total) by mouth 3 (three) times a day  for 14 days, Disp: 42 tablet, Rfl: 0    metoprolol tartrate (LOPRESSOR) 50 mg tablet, TAKE 1 TABLET BY MOUTH EVERY 12 HOURS, Disp: 200 tablet, Rfl: 1    nystatin (MYCOSTATIN) cream, Apply topically 2 (two) times a day, Disp: 30 g, Rfl: 2    pravastatin (PRAVACHOL) 10 mg tablet, TAKE 1 TABLET BY MOUTH EVERYDAY AT BEDTIME, Disp: 90 tablet, Rfl: 1    Current Allergies     Allergies as of 09/09/2024 - Reviewed 09/09/2024   Allergen Reaction Noted    Iodinated contrast media Hives 07/20/2012            The following portions of the patient's history were reviewed and updated as appropriate: allergies, current medications, past family history, past medical history, past social history, past surgical history and problem list.     Past Medical History:   Diagnosis Date    Acute pulmonary embolism without acute cor pulmonale (HCC) 12/22/2022    KAROLINA (acute kidney injury) (HCC) 12/28/2023    COVID-19 12/28/2023    Diabetes mellitus (HCC)     Hemorrhoids     Hyperlipidemia     Hypertension     ovarian     Pseudopolyposis of colon without complication, unspecified part of colon (HCC) 07/01/2022       Past Surgical History:   Procedure Laterality Date    BREAST BIOPSY      Bx breast percutan needle core use image guide (stereotactic)    CATARACT EXTRACTION      COLONOSCOPY      HEMORROIDECTOMY      NC LIGATION/BIOPSY TEMPORAL ARTERY Bilateral 10/17/2022    Procedure: BIOPSY ARTERY TEMPORAL;  Surgeon: Mary Eldridge DO;  Location: [facility];  Service: Vascular    TOTAL ABDOMINAL HYSTERECTOMY         Family History   Problem Relation Age of Onset    Heart disease Mother     Lung cancer Mother     Uterine cancer Mother     Heart disease Father     Breast cancer Sister     Diabetes Maternal Grandmother          Medications have been verified.        Objective   /70   Pulse 75   Temp 97.9 °F (36.6 °C)   Resp 18   SpO2 96%   No LMP recorded. Patient has had a hysterectomy.       Physical Exam     Physical Exam  Vitals  and nursing note reviewed.   Constitutional:       Appearance: Normal appearance. She is normal weight.   Cardiovascular:      Rate and Rhythm: Normal rate and regular rhythm.      Pulses: Normal pulses.      Heart sounds: Normal heart sounds.   Pulmonary:      Effort: Pulmonary effort is normal.      Breath sounds: Normal breath sounds.   Neurological:      Mental Status: She is alert.       Right upper lip 3 sutures removed.  Wound healed.

## 2024-09-11 NOTE — UTILIZATION REVIEW
Initial Clinical Review    Admission: Date/Time/Statement:   Admission Orders (From admission, onward)       Ordered        12/28/23 2040  INPATIENT ADMISSION  Once                          Orders Placed This Encounter   Procedures    INPATIENT ADMISSION     Standing Status:   Standing     Number of Occurrences:   1     Order Specific Question:   Level of Care     Answer:   Med Surg [16]     Order Specific Question:   Estimated length of stay     Answer:   More than 2 Midnights     Order Specific Question:   Certification     Answer:   I certify that inpatient services are medically necessary for this patient for a duration of greater than two midnights. See H&P and MD Progress Notes for additional information about the patient's course of treatment.     ED Arrival Information       Expected   -    Arrival   12/28/2023 17:10    Acuity   Urgent              Means of arrival   Walk-In    Escorted by   Self    Service   Hospitalist    Admission type   Emergency              Arrival complaint   abnormal labs             Chief Complaint   Patient presents with    Abnormal Lab     Reports they were told to come in for further evaluation of low sodium and pneumonia.        Initial Presentation: 83 y.o. female presents to ED from home  with generalized weakness, anorexia, fatigue, SOB, dry cough, nausea and chills for past 2 days. Saw  PCP the day prior to this,  has  COVID  and  feels she may have it as well.  COVID test in office  negative.  Started on  Z pack for suspected CAP. Had  OP  CXR and labs, received a call with abnormal results and ED recommended.   Not  usually  requiring  AD rto ambulate,  has needed  a cane recently. Tested positive for COVID  in ED.  Labs reveal sodium 125,  elevated creatinine,  U/A  negative, lactic acid  1.6.  Did not meet  sepsis criteria. Currently maintained on  RA.  CT chest unremarkable.  PMH  is    HTN and  DM2. Admit  Ip  with  COVID  19, Hyponatremia, KAROLINA,  hypokalemia   and generalized weakness and plan is  monitor labs, daily weight, IV remdesivir, gentle IVF, PT/OT, tele, replace electrolytes and FELI>          Date:  12/29   Day 2:   Continue  PT/OT.  Continue  FELI/IV remdesivir/IVF.       Date:  12/30    Day 3: Has surpassed a 2nd midnight with active treatments and services, which include .   Remains on  FELI  and  IV  remdesivir.  IVF  now  d/c.  O2  sats  96 % RA.    ED Triage Vitals   Temperature Pulse Respirations Blood Pressure SpO2   12/28/23 1718 12/28/23 1718 12/28/23 1718 12/28/23 1718 12/28/23 1718   97.5 °F (36.4 °C) 83 18 122/73 94 %      Temp Source Heart Rate Source Patient Position - Orthostatic VS BP Location FiO2 (%)   12/28/23 1718 12/28/23 1718 12/28/23 1718 12/28/23 1718 --   Oral Monitor Sitting Right arm       Pain Score       12/29/23 0020       No Pain          Wt Readings from Last 1 Encounters:   12/29/23 75.3 kg (166 lb)     Additional Vital Signs:   99.1 °F (37.3 °C) 108 Abnormal  18 135/64 89 95 % None (Room air) Lying    12/29/23 0750 98 °F (36.7 °C) 79 18 120/63 83 96 % None (Room air) Lying   12/29/23 0253 97 °F (36.1 °C) Abnormal  111 Abnormal  18 141/75 88 96 % None (Room air) Lying   12/28/23 2259 97.5 °F (36.4 °C) 121 Abnormal  18 140/73 90 99 % None (Room air) Lying   12/28/23 2230 -- 112 Abnormal  20 139/63 90 99 % None (Room air) Sitting   12/28/23 2200 -- 108 Abnormal  20 139/62 89 100 % None (Room air) Sitting   12/28/23 2117 -- 89 20 158/68 -- 97 % None (Room air) Sitting   12/28/23 1900 -- 100 19 142/64 92 97 % None (Room air) Sitting   12/28/23 1718 97.5 °F (36.4 °C) 83 18 122/73 -- 94 % None (Room air) Sitting     Pertinent Labs/Diagnostic Test Results:   CT chest without contrast   Final Result by Ld Baez MD (12/28 2038)      No acute finding in the chest.      Incidental thyroid nodule(s) for which nonemergent thyroid ultrasound is recommended.      The study was marked in EPIC for immediate notification.          Workstation performed: [id number]           Results from last 7 days   Lab Units 12/28/23  1849   SARS-COV-2  Positive*     Results from last 7 days   Lab Units 12/29/23  0504 12/28/23  1849 12/27/23  1247   WBC Thousand/uL 2.83* 4.15* 2.88*   HEMOGLOBIN g/dL 11.1* 12.9 13.1   HEMATOCRIT % 31.3* 37.9 38.6   PLATELETS Thousands/uL 157 160 181   NEUTROS ABS Thousands/µL  --  3.08 1.96   BANDS PCT % 1  --   --          Results from last 7 days   Lab Units 12/29/23  0947 12/29/23  0504 12/29/23  0035 12/28/23 1849 12/27/23  1247   SODIUM mmol/L 129* 127* 126* 125* 126*   POTASSIUM mmol/L 3.0* 3.0* 4.0 2.8* 3.4*   CHLORIDE mmol/L 92* 93* 92* 86* 88*   CO2 mmol/L 30 28 25 29 31   ANION GAP mmol/L 7 6 9 10 7   BUN mg/dL 19 21 25 30* 26*   CREATININE mg/dL 0.84 0.83 1.03 1.38* 1.24   EGFR ml/min/1.73sq m 64 65 50 35 40   CALCIUM mg/dL 9.6 9.1 9.4 9.8 10.4*   MAGNESIUM mg/dL  --   --   --  1.6*  --      Results from last 7 days   Lab Units 12/29/23  0504 12/28/23 1849 12/27/23  1247   AST U/L 34 39 40*   ALT U/L 30 33 30   ALK PHOS U/L 41 45 46   TOTAL PROTEIN g/dL 5.5* 6.3* 6.9   ALBUMIN g/dL 3.2* 3.7 4.0   TOTAL BILIRUBIN mg/dL 0.56 0.95 0.71         Results from last 7 days   Lab Units 12/29/23  0947 12/29/23  0504 12/29/23  0035 12/28/23 1849 12/27/23  1247   GLUCOSE RANDOM mg/dL 110 98 103 100 106           Results from last 7 days   Lab Units 12/28/23  1849   CK TOTAL U/L 131     Results from last 7 days   Lab Units 12/29/23  0312 12/29/23  0101 12/28/23  2112   HS TNI 0HR ng/L  --   --  12   HS TNI 2HR ng/L  --  14  --    HSTNI D2 ng/L  --  2  --    HS TNI 4HR ng/L 14  --   --    HSTNI D4 ng/L 2  --   --          Results from last 7 days   Lab Units 12/28/23  1849   PROTIME seconds 21.2*   INR  1.81*   PTT seconds 46*     Results from last 7 days   Lab Units 12/27/23  1247   TSH 3RD GENERATON uIU/mL 0.637     Results from last 7 days   Lab Units 12/29/23  0504 12/28/23  1849   PROCALCITONIN ng/ml 0.39* 0.48*      Results from last 7 days   Lab Units 12/28/23  1849   LACTIC ACID mmol/L 1.6             Results from last 7 days   Lab Units 12/28/23  1849   BNP pg/mL 91                         Results from last 7 days   Lab Units 12/29/23  0504 12/28/23  1849   CRP mg/L 97.2* 104.3*             Results from last 7 days   Lab Units 12/28/23  1945   CLARITY UA  Clear   COLOR UA  Yellow   SPEC GRAV UA  1.011   PH UA  5.5   GLUCOSE UA mg/dl Negative   KETONES UA mg/dl Trace*   BLOOD UA  Negative   PROTEIN UA mg/dl Negative   NITRITE UA  Negative   BILIRUBIN UA  Negative   UROBILINOGEN UA (BE) mg/dl <2.0   LEUKOCYTES UA  Negative     Results from last 7 days   Lab Units 12/28/23  1945 12/28/23 1849   LEGIONELLA URINARY ANTIGEN  Negative  --    INFLUENZA A PCR   --  Negative   INFLUENZA B PCR   --  Negative   RSV PCR   --  Negative             ED Treatment:   Medication Administration from 12/28/2023 1710 to 12/28/2023 2257         Date/Time Order Dose Route Action Comments     12/28/2023 2113 EST sodium chloride 0.9 % bolus 1,000 mL 0 mL Intravenous Stopped --     12/28/2023 1853 EST sodium chloride 0.9 % bolus 1,000 mL 1,000 mL Intravenous New Bag --     12/28/2023 2042 EST magnesium sulfate 2 g/50 mL IVPB (premix) 2 g 0 g Intravenous Stopped --     12/28/2023 1957 EST magnesium sulfate 2 g/50 mL IVPB (premix) 2 g 2 g Intravenous New Bag --     12/28/2023 1947 EST potassium chloride (K-DUR,KLOR-CON) CR tablet 40 mEq 40 mEq Oral Given --     12/28/2023 2158 EST potassium chloride 20 mEq IVPB (premix) 0 mEq Intravenous Stopped --     12/28/2023 1950 EST potassium chloride 20 mEq IVPB (premix) 20 mEq Intravenous New Bag --     12/28/2023 2158 EST remdesivir (Veklury) 200 mg in sodium chloride 0.9 % 290 mL IVPB 0 mg Intravenous Stopped --     12/28/2023 2113 EST remdesivir (Veklury) 200 mg in sodium chloride 0.9 % 290 mL IVPB 200 mg Intravenous New Bag --            Present on Admission:   Hypokalemia   Mixed hyperlipidemia    Hypertension   Acute pulmonary embolism without acute cor pulmonale (HCC)   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 female  Admission Orders:  Scheduled Medications:  apixaban, 5 mg, Oral, BID  cefTRIAXone, 1,000 mg, Intravenous, Q24H  metoprolol tartrate, 50 mg, Oral, Q12H ELIAS  potassium chloride, 20 mEq, Intravenous, Q2H  pravastatin, 10 mg, Oral, HS  remdesivir, 100 mg, Intravenous, Q24H      Continuous IV Infusions:     PRN Meds:  acetaminophen, 650 mg, Oral, Q6H PRN  aluminum-magnesium hydroxide-simethicone, 30 mL, Oral, Q6H PRN  ondansetron, 4 mg, Intravenous, Q6H PRN          Network Utilization Review Department  ATTENTION: Please call with any questions or concerns to [phone] and carefully listen to the prompts so that you are directed to the right person. All voicemails are confidential.   For Discharge needs, contact Care Management DC Support Team at [phone] opt. 2  Send all requests for admission clinical reviews, approved or denied determinations and any other requests to dedicated fax number below belonging to the campus where the patient is receiving treatment. List of dedicated fax numbers for the Facilities:  FACILITY NAME UR FAX NUMBER   ADMISSION DENIALS (Administrative/Medical Necessity) [phone]   DISCHARGE SUPPORT TEAM (NETWORK) [phone]   PARENT CHILD HEALTH (Maternity/NICU/Pediatrics) [phone]   [facility] [phone]   [facility] [phone]   [facility] [phone]   [facility] [phone]   [facility] [phone]   [facility] [phone]   [facility] [phone]   [facility] [phone]   [facility] [phone]   [facility] [phone]   [facility] [phone]         Detail Level: Simple Patient Specific Otc Recommendations (Will Not Stick From Patient To Patient): Aquaphor\\n\\notc hc 1% bid x 2-3 days

## 2024-10-18 ENCOUNTER — HOSPITAL ENCOUNTER (OUTPATIENT)
Dept: BONE DENSITY | Facility: MEDICAL CENTER | Age: 84
Discharge: HOME | End: 2024-10-18
Payer: COMMERCIAL

## 2024-10-18 DIAGNOSIS — M85.80 OSTEOPENIA, UNSPECIFIED LOCATION: ICD-10-CM

## 2024-10-18 DIAGNOSIS — M85.80 OSTEOPENIA AFTER MENOPAUSE: ICD-10-CM

## 2024-10-18 DIAGNOSIS — Z78.0 OSTEOPENIA AFTER MENOPAUSE: ICD-10-CM

## 2024-10-18 PROCEDURE — 77080 DXA BONE DENSITY AXIAL: CPT

## 2024-11-24 DIAGNOSIS — E78.2 MIXED HYPERLIPIDEMIA: ICD-10-CM

## 2024-11-26 RX ORDER — PRAVASTATIN SODIUM 10 MG
10 TABLET ORAL
Qty: 90 TABLET | Refills: 1 | Status: SHIPPED | OUTPATIENT
Start: 2024-11-26

## 2024-12-18 ENCOUNTER — TELEPHONE (OUTPATIENT)
Dept: FAMILY MEDICINE CLINIC | Facility: CLINIC | Age: 84
End: 2024-12-18

## 2024-12-18 DIAGNOSIS — R00.0 SINUS TACHYCARDIA: ICD-10-CM

## 2024-12-18 NOTE — TELEPHONE ENCOUNTER
Patient called requesting refill for Pravastatin. Patient made aware medication was refilled on 1/26/24 for 90 with 1 refills to [facility] pharmacy. Patient instructed to contact the pharmacy to obtain refills of medication. Patient verbalized understanding.

## 2024-12-19 RX ORDER — METOPROLOL TARTRATE 50 MG
50 TABLET ORAL EVERY 12 HOURS
Qty: 200 TABLET | Refills: 1 | Status: SHIPPED | OUTPATIENT
Start: 2024-12-19

## 2025-01-15 ENCOUNTER — OFFICE VISIT (OUTPATIENT)
Dept: FAMILY MEDICINE CLINIC | Facility: CLINIC | Age: 85
End: 2025-01-15
Payer: COMMERCIAL

## 2025-01-15 VITALS
BODY MASS INDEX: 32.94 KG/M2 | DIASTOLIC BLOOD PRESSURE: 60 MMHG | HEIGHT: 62 IN | WEIGHT: 179 LBS | HEART RATE: 88 BPM | OXYGEN SATURATION: 94 % | SYSTOLIC BLOOD PRESSURE: 140 MMHG

## 2025-01-15 DIAGNOSIS — K51.40 PSEUDOPOLYPOSIS OF COLON WITHOUT COMPLICATION, UNSPECIFIED PART OF COLON (HCC): ICD-10-CM

## 2025-01-15 DIAGNOSIS — E11.311 TYPE 2 DIABETES MELLITUS WITH LEFT EYE AFFECTED BY RETINOPATHY AND MACULAR EDEMA, WITHOUT LONG-TERM CURRENT USE OF INSULIN, UNSPECIFIED RETINOPATHY SEVERITY (HCC): ICD-10-CM

## 2025-01-15 DIAGNOSIS — L30.4 INTERTRIGO: Primary | ICD-10-CM

## 2025-01-15 PROCEDURE — 99214 OFFICE O/P EST MOD 30 MIN: CPT | Performed by: NURSE PRACTITIONER

## 2025-01-15 RX ORDER — KETOCONAZOLE 20 MG/G
CREAM TOPICAL 2 TIMES DAILY
Qty: 60 G | Refills: 1 | Status: SHIPPED | OUTPATIENT
Start: 2025-01-15

## 2025-01-15 RX ORDER — FLUCONAZOLE 150 MG/1
150 TABLET ORAL ONCE
Qty: 1 TABLET | Refills: 0 | Status: SHIPPED | OUTPATIENT
Start: 2025-01-15 | End: 2025-01-15

## 2025-01-15 NOTE — PROGRESS NOTES
Name: Jolene Segundo      : 1940      MRN: [id number]  Encounter Provider: SHREE Stewart  Encounter Date: 1/15/2025   Encounter department: [facility]  :  Assessment & Plan  Intertrigo  Ketoconazole cream was ordered to be used on the affected areas twice daily.  Diflucan was also ordered to be taken as directed for treatment.  I did also reinforce the importance of making sure that the patient completely dries under her breasts after bathing and I did advise her that she may need to use a towel or washcloth to dry the area under her breast throughout the day if she notes she is sweating profusely.  Orders:  •  ketoconazole (NIZORAL) 2 % cream; Apply topically 2 (two) times a day  •  fluconazole (DIFLUCAN) 150 mg tablet; Take 1 tablet (150 mg total) by mouth once for 1 dose    Pseudopolyposis of colon without complication, unspecified part of colon (HCC)  Patient denies any current GI symptoms.       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  Patient does have a hemoglobin A1c ordered to be completed prior to her next office visit.             Depression Screening and Follow-up Plan: Patient was screened for depression during today's encounter. They screened negative with a PHQ-2 score of 0.      History of Present Illness     Intertrigo: Patient reports having a fungal infection underneath her bilateral breasts.  She has been using over-the-counter antifungals with very limited improvement of her symptoms.    Pseudopolyposis of colon: Patient denies any current GI symptoms at this time.    Type II diabetes: Patient's most recent hemoglobin A1c was 6.0 showing great control.  Patient denies polydipsia, polyphagia, or polyuria.      Review of Systems  "  Constitutional:  Negative for chills and fever.   HENT:  Negative for ear pain and sore throat.    Eyes:  Negative for pain and visual disturbance.   Respiratory:  Negative for cough, chest tightness, shortness of breath and wheezing.    Cardiovascular:  Negative for chest pain, palpitations and leg swelling.   Gastrointestinal:  Negative for abdominal pain, constipation, diarrhea, nausea and vomiting.   Endocrine: Negative for cold intolerance and heat intolerance.   Genitourinary:  Negative for decreased urine volume, dysuria and hematuria.   Musculoskeletal:  Negative for arthralgias, back pain and myalgias.   Skin:  Positive for rash (beneath bilateral breasts). Negative for color change.   Allergic/Immunologic: Negative for environmental allergies.   Neurological:  Negative for dizziness, seizures, syncope, weakness, light-headedness, numbness and headaches.   Hematological:  Negative for adenopathy.   Psychiatric/Behavioral:  Negative for confusion. The patient is not nervous/anxious.    All other systems reviewed and are negative.      Objective   /60 (BP Location: Right arm, Patient Position: Sitting, Cuff Size: Standard)   Pulse 88   Ht 5' 2\" (1.575 m)   Wt 81.2 kg (179 lb)   SpO2 94%   BMI 32.74 kg/m²      Physical Exam  Vitals and nursing note reviewed.   Constitutional:       General: She is not in acute distress.     Appearance: Normal appearance. She is well-developed. She is not ill-appearing.   HENT:      Head: Normocephalic.   Eyes:      Conjunctiva/sclera: Conjunctivae normal.   Cardiovascular:      Rate and Rhythm: Normal rate and regular rhythm.      Pulses: Normal pulses.           Carotid pulses are 2+ on the right side and 2+ on the left side.       Radial pulses are 2+ on the right side and 2+ on the left side.        Posterior tibial pulses are 2+ on the right side and 2+ on the left side.      Heart sounds: Normal heart sounds. No murmur heard.  Pulmonary:      Effort: " Pulmonary effort is normal. No respiratory distress.      Breath sounds: Normal breath sounds. No decreased breath sounds, wheezing, rhonchi or rales.   Chest:          Comments: Red, irritated, dry, macular rash present underneath the bilateral breasts consistent with intertrigo.  Abdominal:      General: Abdomen is flat. Bowel sounds are normal. There is no distension.      Palpations: Abdomen is soft.      Tenderness: There is no abdominal tenderness. There is no guarding.   Musculoskeletal:         General: No swelling. Normal range of motion.      Cervical back: Normal range of motion and neck supple.      Right lower leg: No edema.      Left lower leg: No edema.   Skin:     General: Skin is warm and dry.      Capillary Refill: Capillary refill takes less than 2 seconds.      Findings: Rash present. Rash is macular.   Neurological:      General: No focal deficit present.      Mental Status: She is alert and oriented to person, place, and time.   Psychiatric:         Mood and Affect: Mood normal.         Behavior: Behavior normal.         Thought Content: Thought content normal.         Judgment: Judgment normal.

## 2025-01-15 NOTE — ASSESSMENT & PLAN NOTE
Lab Results   Component Value Date    HGBA1C 6.0 (H) 07/23/2024   This remains diet controlled.  Patient does have a hemoglobin A1c ordered to be completed prior to her next office visit.

## 2025-01-15 NOTE — ASSESSMENT & PLAN NOTE
Ketoconazole cream was ordered to be used on the affected areas twice daily.  Diflucan was also ordered to be taken as directed for treatment.  I did also reinforce the importance of making sure that the patient completely dries under her breasts after bathing and I did advise her that she may need to use a towel or washcloth to dry the area under her breast throughout the day if she notes she is sweating profusely.  Orders:  •  ketoconazole (NIZORAL) 2 % cream; Apply topically 2 (two) times a day  •  fluconazole (DIFLUCAN) 150 mg tablet; Take 1 tablet (150 mg total) by mouth once for 1 dose

## 2025-01-29 ENCOUNTER — RA CDI HCC (OUTPATIENT)
Dept: OTHER | Facility: HOSPITAL | Age: 85
End: 2025-01-29

## 2025-01-29 NOTE — PROGRESS NOTES
HCC coding opportunities          Chart Reviewed number of suggestions sent to Provider: 3   E11.22, E11.51  Recommend adding I12.9 - combination code - hypertensive renal disease.  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

## 2025-02-03 ENCOUNTER — OFFICE VISIT (OUTPATIENT)
Dept: FAMILY MEDICINE CLINIC | Facility: CLINIC | Age: 85
End: 2025-02-03
Payer: COMMERCIAL

## 2025-02-03 ENCOUNTER — RESULTS FOLLOW-UP (OUTPATIENT)
Dept: FAMILY MEDICINE CLINIC | Facility: CLINIC | Age: 85
End: 2025-02-03

## 2025-02-03 VITALS
HEART RATE: 65 BPM | DIASTOLIC BLOOD PRESSURE: 64 MMHG | WEIGHT: 178.6 LBS | HEIGHT: 62 IN | BODY MASS INDEX: 32.87 KG/M2 | SYSTOLIC BLOOD PRESSURE: 140 MMHG | OXYGEN SATURATION: 97 %

## 2025-02-03 DIAGNOSIS — L30.4 INTERTRIGO: ICD-10-CM

## 2025-02-03 DIAGNOSIS — E11.311 TYPE 2 DIABETES MELLITUS WITH LEFT EYE AFFECTED BY RETINOPATHY AND MACULAR EDEMA, WITHOUT LONG-TERM CURRENT USE OF INSULIN, UNSPECIFIED RETINOPATHY SEVERITY (HCC): Primary | ICD-10-CM

## 2025-02-03 LAB
LEFT EYE DIABETIC RETINOPATHY: NORMAL
LEFT EYE IMAGE QUALITY: NORMAL
LEFT EYE MACULAR EDEMA: NORMAL
LEFT EYE OTHER RETINOPATHY: NORMAL
RIGHT EYE DIABETIC RETINOPATHY: NORMAL
RIGHT EYE IMAGE QUALITY: NORMAL
RIGHT EYE MACULAR EDEMA: NORMAL
RIGHT EYE OTHER RETINOPATHY: NORMAL
SEVERITY (EYE EXAM): NORMAL

## 2025-02-03 PROCEDURE — 99213 OFFICE O/P EST LOW 20 MIN: CPT | Performed by: PHYSICIAN ASSISTANT

## 2025-02-03 PROCEDURE — 92250 FUNDUS PHOTOGRAPHY W/I&R: CPT | Performed by: PHYSICIAN ASSISTANT

## 2025-02-03 PROCEDURE — G2211 COMPLEX E/M VISIT ADD ON: HCPCS | Performed by: PHYSICIAN ASSISTANT

## 2025-02-03 RX ORDER — FLUCONAZOLE 150 MG/1
TABLET ORAL
Qty: 5 TABLET | Refills: 1 | Status: SHIPPED | OUTPATIENT
Start: 2025-02-03 | End: 2025-02-12

## 2025-02-03 NOTE — PROGRESS NOTES
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  Will also have her do miconazole powder daily.  If this is not covered by her insurance will have her do Goldbond medicated powder instead.  Orders:  •  fluconazole (DIFLUCAN) 150 mg tablet; Take one tablet every other day for 5 doses.  •  miconazole (MICOTIN) 2 % powder; Apply topically as needed for itching           History of Present Illness   Patient presents with:  Rash: Saw DL last week for rash - on chest and under breast. Used water and vinegar on area and it helped with itchy.    Patient states that she thinks the ketoconazole made it worse as that the redness and the rash did spread although it does overall feel better.          Review of Systems   Constitutional: Negative.    HENT: Negative.     Eyes: Negative.    Respiratory: Negative.     Cardiovascular: Negative.    Gastrointestinal: Negative.    Endocrine: Negative.    Genitourinary: Negative.    Musculoskeletal: Negative.    Skin:  Positive for rash.   Allergic/Immunologic: Negative.    Neurological: Negative.    Hematological: Negative.    Psychiatric/Behavioral: Negative.         Objective   /64 (BP Location: Left arm, Patient Position: Sitting, Cuff Size: Standard)   Pulse 65   Ht 5' 2\" (1.575 m)   Wt 81 kg (178 lb 9.6 oz)   SpO2 97%   BMI 32.67 kg/m²      Physical Exam  Vitals and nursing " note reviewed.   Constitutional:       Appearance: Normal appearance. She is well-developed.   HENT:      Head: Normocephalic and atraumatic.   Eyes:      General: Lids are normal.      Conjunctiva/sclera: Conjunctivae normal.      Pupils: Pupils are equal, round, and reactive to light.   Cardiovascular:      Rate and Rhythm: Normal rate and regular rhythm.      Heart sounds: No murmur heard.  Pulmonary:      Effort: Pulmonary effort is normal.      Breath sounds: Normal breath sounds.   Chest:          Comments: Sensitive area of bright red erythematous skin beneath bilateral breasts and mid breast as well with satellite lesions shown.  No open areas  Skin:     General: Skin is warm and dry.   Neurological:      General: No focal deficit present.      Mental Status: She is alert.      Coordination: Coordination is intact.   Psychiatric:         Mood and Affect: Mood normal.         Behavior: Behavior normal. Behavior is cooperative.         Thought Content: Thought content normal.         Judgment: Judgment normal.

## 2025-02-03 NOTE — ASSESSMENT & PLAN NOTE
Patient does have a severe and worsening case of intertrigo beneath both breasts so we will extend her Diflucan treatment to 1 dose every other day for 5 doses.  Will also have her do miconazole powder daily.  If this is not covered by her insurance will have her do Goldbond medicated powder instead.  Orders:  •  fluconazole (DIFLUCAN) 150 mg tablet; Take one tablet every other day for 5 doses.  •  miconazole (MICOTIN) 2 % powder; Apply topically as needed for itching

## 2025-02-03 NOTE — ASSESSMENT & PLAN NOTE
Lab Results   Component Value Date    HGBA1C 6.0 (H) 07/23/2024     Orders:  •  IRIS Diabetic eye exam

## 2025-02-03 NOTE — PATIENT INSTRUCTIONS
1. 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 Intertrigo  Assessment & Plan:  Patient does have a severe and worsening case of intertrigo beneath both breasts so we will extend her Diflucan treatment to 1 dose every other day for 5 doses.  Will also have her do miconazole powder daily.  If this is not covered by her insurance will have her do Goldbond medicated powder instead.  Orders:    fluconazole (DIFLUCAN) 150 mg tablet; Take one tablet every other day for 5 doses.    miconazole (MICOTIN) 2 % powder; Apply topically as needed for itching    Orders:  -     fluconazole (DIFLUCAN) 150 mg tablet; Take one tablet every other day for 5 doses.  -     miconazole (MICOTIN) 2 % powder; Apply topically as needed for itching

## 2025-02-14 ENCOUNTER — TELEPHONE (OUTPATIENT)
Dept: ADMINISTRATIVE | Facility: OTHER | Age: 85
End: 2025-02-14

## 2025-02-14 ENCOUNTER — OFFICE VISIT (OUTPATIENT)
Dept: FAMILY MEDICINE CLINIC | Facility: CLINIC | Age: 85
End: 2025-02-14
Payer: COMMERCIAL

## 2025-02-14 VITALS
WEIGHT: 176.6 LBS | HEIGHT: 62 IN | SYSTOLIC BLOOD PRESSURE: 138 MMHG | DIASTOLIC BLOOD PRESSURE: 70 MMHG | HEART RATE: 74 BPM | BODY MASS INDEX: 32.5 KG/M2 | OXYGEN SATURATION: 100 %

## 2025-02-14 DIAGNOSIS — E11.311 TYPE 2 DIABETES MELLITUS WITH LEFT EYE AFFECTED BY RETINOPATHY AND MACULAR EDEMA, WITHOUT LONG-TERM CURRENT USE OF INSULIN, UNSPECIFIED RETINOPATHY SEVERITY (HCC): ICD-10-CM

## 2025-02-14 DIAGNOSIS — E55.9 VITAMIN D DEFICIENCY: ICD-10-CM

## 2025-02-14 DIAGNOSIS — I10 PRIMARY HYPERTENSION: Primary | ICD-10-CM

## 2025-02-14 DIAGNOSIS — M85.80 OSTEOPENIA, UNSPECIFIED LOCATION: ICD-10-CM

## 2025-02-14 DIAGNOSIS — E78.2 MIXED HYPERLIPIDEMIA: ICD-10-CM

## 2025-02-14 DIAGNOSIS — L30.4 INTERTRIGO: ICD-10-CM

## 2025-02-14 PROBLEM — R53.1 GENERALIZED WEAKNESS: Status: RESOLVED | Noted: 2018-10-16 | Resolved: 2025-02-14

## 2025-02-14 PROCEDURE — G2211 COMPLEX E/M VISIT ADD ON: HCPCS | Performed by: PHYSICIAN ASSISTANT

## 2025-02-14 PROCEDURE — 99214 OFFICE O/P EST MOD 30 MIN: CPT | Performed by: PHYSICIAN ASSISTANT

## 2025-02-14 RX ORDER — KETOCONAZOLE 20 MG/ML
1 SHAMPOO, SUSPENSION TOPICAL 2 TIMES WEEKLY
Qty: 120 ML | Refills: 1 | Status: SHIPPED | OUTPATIENT
Start: 2025-02-17

## 2025-02-14 NOTE — PROGRESS NOTES
Name: Jolene Segundo      : 1940      MRN: [id number]  Encounter Provider: Aisha Martinez PA-C  Encounter Date: 2025   Encounter department: [facility] PRIMARY [facility]  :  Assessment & Plan  Primary hypertension  Blood pressure is controlled on Lopressor 50 and HCTZ 25.       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  Lab Results   Component Value Date    HGBA1C 6.0 (H) 2024     Orders:  •  Albumin / creatinine urine ratio; Future  •  Comprehensive metabolic panel; Future  •  Hemoglobin A1C; Future    Intertrigo  Much improved overall but still present.  I will have her start using Nizoral shampoo twice weekly and continue all other measures as previously discussed.  Orders:  •  ketoconazole (NIZORAL) 2 % shampoo; Apply 1 Application topically 2 (two) times a week    Mixed hyperlipidemia  Does take Pravachol 10 daily at bedtime.  Order for fasting lipid panel reprinted call with results if stable check in 6 months.  Orders:  •  Lipid Panel with Direct LDL reflex; Future    Vitamin D deficiency  D order reprinted call with results.  Normal recheck in 1 year.       Osteopenia, unspecified location  DEXA not due until .  Encourage weightbearing exercises and avoidance of excessive alcohol and caffeine.  Making sure taking her vitamin D every day and getting at least 1200 mg of calcium              History of Present Illness   Patient presents with:  Follow-up: Patient present for her 6 month follow up. Pt states she is still having the yeast under her breast.  Diabetes: Diabetic foot exam done on  by Dr Can   Sent to [facility].    Patient did not go for fasting blood work to review today.        Review of Systems   Constitutional: Negative.   "  HENT: Negative.     Eyes: Negative.    Respiratory: Negative.     Cardiovascular: Negative.    Gastrointestinal: Negative.    Endocrine: Negative.    Genitourinary: Negative.    Musculoskeletal: Negative.    Skin: Negative.    Allergic/Immunologic: Negative.    Neurological: Negative.    Hematological: Negative.    Psychiatric/Behavioral: Negative.         Objective   /70 (BP Location: Right arm, Patient Position: Sitting, Cuff Size: Large)   Pulse 74   Ht 5' 2\" (1.575 m)   Wt 80.1 kg (176 lb 9.6 oz)   SpO2 100%   BMI 32.30 kg/m²      Physical Exam  Vitals and nursing note reviewed.   Constitutional:       Appearance: Normal appearance. She is well-developed.   HENT:      Head: Normocephalic and atraumatic.   Eyes:      General: Lids are normal.      Conjunctiva/sclera: Conjunctivae normal.      Pupils: Pupils are equal, round, and reactive to light.   Cardiovascular:      Rate and Rhythm: Normal rate and regular rhythm.      Heart sounds: No murmur heard.  Pulmonary:      Effort: Pulmonary effort is normal.      Breath sounds: Normal breath sounds.   Skin:     General: Skin is warm and dry.   Neurological:      General: No focal deficit present.      Mental Status: She is alert.      Coordination: Coordination is intact.   Psychiatric:         Mood and Affect: Mood normal.         Behavior: Behavior normal. Behavior is cooperative.         Thought Content: Thought content normal.         Judgment: Judgment normal.         "

## 2025-02-14 NOTE — TELEPHONE ENCOUNTER
----- Message from Jaimee MORE sent at 2/14/2025  8:05 AM EST -----  Regarding: DIABETIC FOOT EXAM  02/14/25 8:06 AM    Hello, our patient Jolene Segundo has had Diabetic Foot Exam completed/performed. Please assist in updating the patient chart by making an External outreach to [facility]  facility located in [facility]. The date of service is 1/24 BY DR PADRON.    Thank you,  Jaimee Molina MA  [facility] PR[facility]

## 2025-02-14 NOTE — ASSESSMENT & PLAN NOTE
Much improved overall but still present.  I will have her start using Nizoral shampoo twice weekly and continue all other measures as previously discussed.  Orders:  •  ketoconazole (NIZORAL) 2 % shampoo; Apply 1 Application topically 2 (two) times a week   No

## 2025-02-14 NOTE — PATIENT INSTRUCTIONS
1. Primary hypertension  Assessment & Plan:  Blood pressure is controlled on Lopressor 50 and HCTZ 25.  2. 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  Lab Results   Component Value Date    HGBA1C 6.0 (H) 07/23/2024     3. Intertrigo  4. Mixed hyperlipidemia  Assessment & Plan:  Does take Pravachol 10 daily at bedtime.  Order for fasting lipid panel reprinted call with results if stable check in 6 months.  5. Vitamin D deficiency  Assessment & Plan:  D order reprinted call with results.  Normal recheck in 1 year.  6. Osteopenia, unspecified location  Assessment & Plan:  DEXA not due until 2026.  Encourage weightbearing exercises and avoidance of excessive alcohol and caffeine.  Making sure taking her vitamin D every day and getting at least 1200 mg of calcium

## 2025-02-14 NOTE — TELEPHONE ENCOUNTER
Upon review of the In Basket request and the patient's chart, initial outreach has been made via fax to facility. Please see Contacts section for details.     Thank you  Rashard Denson MA

## 2025-02-14 NOTE — LETTER
Diabetic Foot Exam Form    Date Requested: 25  Patient: Jolene Segundo  Patient : 1940   Referring Provider: Aisha Martinez PA-C    Diabetic Foot Exam Performed with shoes and socks removed        Yes         No     Date of Diabetic Foot Exam ______________________________  Risk Score ____________________________________________    Left Foot       Visual Inspection         Monofilament Testing Sensory Exam        Pedal Pulses         Additional Comments         Right Foot      Visual Inspection         Monofilament Testing Sensory Exam       Pedal Pulses         Additional Comments         Comments __________________________________________________________    Practice Providing Exam ______________________________________________    Exam Performed By (print name) _______________________________________      Provider Signature ___________________________________________________      These reports are needed for  compliance.    Please fax this completed form and a copy of the Diabetic Foot Exam report to our office located at [address] [place] [id number] as soon as possible via Fax [phone] attention Rashard: Phone [phone]    We thank you for your assistance in treating our mutual patient.

## 2025-02-14 NOTE — ASSESSMENT & PLAN NOTE
Patient did not have fasting blood work done prior to today visit so we will reprint give it to her and call with results.  Lab Results   Component Value Date    HGBA1C 6.0 (H) 07/23/2024     Orders:  •  Albumin / creatinine urine ratio; Future  •  Comprehensive metabolic panel; Future  •  Hemoglobin A1C; Future

## 2025-02-14 NOTE — ASSESSMENT & PLAN NOTE
DEXA not due until 2026.  Encourage weightbearing exercises and avoidance of excessive alcohol and caffeine.  Making sure taking her vitamin D every day and getting at least 1200 mg of calcium

## 2025-02-14 NOTE — ASSESSMENT & PLAN NOTE
Does take Pravachol 10 daily at bedtime.  Order for fasting lipid panel reprinted call with results if stable check in 6 months.  Orders:  •  Lipid Panel with Direct LDL reflex; Future

## 2025-02-17 ENCOUNTER — APPOINTMENT (OUTPATIENT)
Dept: LAB | Facility: CLINIC | Age: 85
End: 2025-02-17
Payer: COMMERCIAL

## 2025-02-17 DIAGNOSIS — E11.311 TYPE 2 DIABETES MELLITUS WITH LEFT EYE AFFECTED BY RETINOPATHY AND MACULAR EDEMA, WITHOUT LONG-TERM CURRENT USE OF INSULIN, UNSPECIFIED RETINOPATHY SEVERITY (HCC): ICD-10-CM

## 2025-02-17 DIAGNOSIS — E55.9 VITAMIN D DEFICIENCY: ICD-10-CM

## 2025-02-17 DIAGNOSIS — E78.2 MIXED HYPERLIPIDEMIA: ICD-10-CM

## 2025-02-17 DIAGNOSIS — I10 PRIMARY HYPERTENSION: ICD-10-CM

## 2025-02-17 LAB
25(OH)D3 SERPL-MCNC: 68.4 NG/ML (ref 30–100)
ALBUMIN SERPL BCG-MCNC: 4 G/DL (ref 3.5–5)
ALP SERPL-CCNC: 45 U/L (ref 34–104)
ALT SERPL W P-5'-P-CCNC: 14 U/L (ref 7–52)
ANION GAP SERPL CALCULATED.3IONS-SCNC: 14 MMOL/L (ref 4–13)
AST SERPL W P-5'-P-CCNC: 16 U/L (ref 13–39)
BASOPHILS # BLD AUTO: 0.02 THOUSANDS/ΜL (ref 0–0.1)
BASOPHILS NFR BLD AUTO: 0 % (ref 0–1)
BILIRUB SERPL-MCNC: 0.66 MG/DL (ref 0.2–1)
BUN SERPL-MCNC: 19 MG/DL (ref 5–25)
CALCIUM SERPL-MCNC: 10.3 MG/DL (ref 8.4–10.2)
CHLORIDE SERPL-SCNC: 100 MMOL/L (ref 96–108)
CHOLEST SERPL-MCNC: 156 MG/DL (ref ?–200)
CO2 SERPL-SCNC: 27 MMOL/L (ref 21–32)
CREAT SERPL-MCNC: 0.77 MG/DL (ref 0.6–1.3)
CREAT UR-MCNC: 88.4 MG/DL
EOSINOPHIL # BLD AUTO: 0.24 THOUSAND/ΜL (ref 0–0.61)
EOSINOPHIL NFR BLD AUTO: 4 % (ref 0–6)
ERYTHROCYTE [DISTWIDTH] IN BLOOD BY AUTOMATED COUNT: 13.6 % (ref 11.6–15.1)
EST. AVERAGE GLUCOSE BLD GHB EST-MCNC: 126 MG/DL
GFR SERPL CREATININE-BSD FRML MDRD: 71 ML/MIN/1.73SQ M
GLUCOSE P FAST SERPL-MCNC: 118 MG/DL (ref 65–99)
HBA1C MFR BLD: 6 %
HCT VFR BLD AUTO: 41.4 % (ref 34.8–46.1)
HDLC SERPL-MCNC: 48 MG/DL
HGB BLD-MCNC: 13.1 G/DL (ref 11.5–15.4)
IMM GRANULOCYTES # BLD AUTO: 0.02 THOUSAND/UL (ref 0–0.2)
IMM GRANULOCYTES NFR BLD AUTO: 0 % (ref 0–2)
LDLC SERPL CALC-MCNC: 67 MG/DL (ref 0–100)
LYMPHOCYTES # BLD AUTO: 1.41 THOUSANDS/ΜL (ref 0.6–4.47)
LYMPHOCYTES NFR BLD AUTO: 24 % (ref 14–44)
MCH RBC QN AUTO: 28.9 PG (ref 26.8–34.3)
MCHC RBC AUTO-ENTMCNC: 31.6 G/DL (ref 31.4–37.4)
MCV RBC AUTO: 91 FL (ref 82–98)
MICROALBUMIN UR-MCNC: <7 MG/L
MONOCYTES # BLD AUTO: 0.54 THOUSAND/ΜL (ref 0.17–1.22)
MONOCYTES NFR BLD AUTO: 9 % (ref 4–12)
NEUTROPHILS # BLD AUTO: 3.71 THOUSANDS/ΜL (ref 1.85–7.62)
NEUTS SEG NFR BLD AUTO: 63 % (ref 43–75)
NRBC BLD AUTO-RTO: 0 /100 WBCS
PLATELET # BLD AUTO: 236 THOUSANDS/UL (ref 149–390)
PMV BLD AUTO: 11.8 FL (ref 8.9–12.7)
POTASSIUM SERPL-SCNC: 3.7 MMOL/L (ref 3.5–5.3)
PROT SERPL-MCNC: 6.7 G/DL (ref 6.4–8.4)
RBC # BLD AUTO: 4.54 MILLION/UL (ref 3.81–5.12)
SODIUM SERPL-SCNC: 141 MMOL/L (ref 135–147)
TRIGL SERPL-MCNC: 206 MG/DL (ref ?–150)
WBC # BLD AUTO: 5.94 THOUSAND/UL (ref 4.31–10.16)

## 2025-02-17 PROCEDURE — 82570 ASSAY OF URINE CREATININE: CPT

## 2025-02-17 PROCEDURE — 36415 COLL VENOUS BLD VENIPUNCTURE: CPT

## 2025-02-17 PROCEDURE — 85025 COMPLETE CBC W/AUTO DIFF WBC: CPT

## 2025-02-17 PROCEDURE — 82043 UR ALBUMIN QUANTITATIVE: CPT

## 2025-02-17 PROCEDURE — 82306 VITAMIN D 25 HYDROXY: CPT

## 2025-02-17 PROCEDURE — 80061 LIPID PANEL: CPT

## 2025-02-17 PROCEDURE — 83036 HEMOGLOBIN GLYCOSYLATED A1C: CPT

## 2025-02-17 PROCEDURE — 80053 COMPREHEN METABOLIC PANEL: CPT

## 2025-02-17 NOTE — TELEPHONE ENCOUNTER
Upon review of the In Basket request we were able to locate, review, and update the patient chart as requested for Diabetic Foot Exam.    Any additional questions or concerns should be emailed to the Practice Liaisons via the appropriate education email address, please do not reply via In Basket.    Thank you  Rashard Denson MA   [facility]

## 2025-02-19 ENCOUNTER — RESULTS FOLLOW-UP (OUTPATIENT)
Dept: FAMILY MEDICINE CLINIC | Facility: CLINIC | Age: 85
End: 2025-02-19

## 2025-02-19 NOTE — RESULT ENCOUNTER NOTE
Please let the patient or the patient's  know that her blood work is very stable in general.  I am not recommending any medication changes at this time she already has blood work to do in 6 months.  Thank you.

## 2025-03-06 DIAGNOSIS — Z01.818 PREOP TESTING: ICD-10-CM

## 2025-03-06 DIAGNOSIS — Z01.818 PREOP GENERAL PHYSICAL EXAM: ICD-10-CM

## 2025-03-06 DIAGNOSIS — I10 ESSENTIAL HYPERTENSION: ICD-10-CM

## 2025-03-07 RX ORDER — HYDROCHLOROTHIAZIDE 25 MG/1
25 TABLET ORAL DAILY
Qty: 90 TABLET | Refills: 1 | Status: SHIPPED | OUTPATIENT
Start: 2025-03-07

## 2025-03-17 DIAGNOSIS — L30.4 INTERTRIGO: ICD-10-CM

## 2025-03-17 RX ORDER — KETOCONAZOLE 20 MG/G
1 CREAM TOPICAL 2 TIMES DAILY
Qty: 60 G | Refills: 1 | Status: SHIPPED | OUTPATIENT
Start: 2025-03-17

## 2025-04-17 DIAGNOSIS — L30.4 INTERTRIGO: ICD-10-CM

## 2025-04-17 RX ORDER — KETOCONAZOLE 20 MG/ML
1 SHAMPOO, SUSPENSION TOPICAL 2 TIMES WEEKLY
Qty: 120 ML | Refills: 1 | Status: SHIPPED | OUTPATIENT
Start: 2025-04-17

## 2025-05-06 ENCOUNTER — RESULTS FOLLOW-UP (OUTPATIENT)
Dept: FAMILY MEDICINE CLINIC | Facility: CLINIC | Age: 85
End: 2025-05-06

## 2025-05-27 DIAGNOSIS — E78.2 MIXED HYPERLIPIDEMIA: ICD-10-CM

## 2025-05-28 RX ORDER — PRAVASTATIN SODIUM 10 MG
10 TABLET ORAL
Qty: 90 TABLET | Refills: 1 | Status: SHIPPED | OUTPATIENT
Start: 2025-05-28

## 2025-06-26 DIAGNOSIS — L30.4 INTERTRIGO: ICD-10-CM

## 2025-06-26 RX ORDER — KETOCONAZOLE 20 MG/ML
1 SHAMPOO, SUSPENSION TOPICAL 2 TIMES WEEKLY
Qty: 120 ML | Refills: 1 | Status: SHIPPED | OUTPATIENT
Start: 2025-06-26

## 2025-08-13 ENCOUNTER — RA CDI HCC (OUTPATIENT)
Dept: OTHER | Facility: HOSPITAL | Age: 85
End: 2025-08-13

## 2025-08-22 ENCOUNTER — OFFICE VISIT (OUTPATIENT)
Dept: FAMILY MEDICINE CLINIC | Facility: CLINIC | Age: 85
End: 2025-08-22
Payer: COMMERCIAL

## 2025-08-22 VITALS
SYSTOLIC BLOOD PRESSURE: 136 MMHG | WEIGHT: 177.6 LBS | OXYGEN SATURATION: 97 % | HEIGHT: 62 IN | HEART RATE: 68 BPM | BODY MASS INDEX: 32.68 KG/M2 | DIASTOLIC BLOOD PRESSURE: 60 MMHG

## 2025-08-22 DIAGNOSIS — I10 PRIMARY HYPERTENSION: ICD-10-CM

## 2025-08-22 DIAGNOSIS — R00.0 SINUS TACHYCARDIA: ICD-10-CM

## 2025-08-22 DIAGNOSIS — E11.311 TYPE 2 DIABETES MELLITUS WITH LEFT EYE AFFECTED BY RETINOPATHY AND MACULAR EDEMA, WITHOUT LONG-TERM CURRENT USE OF INSULIN, UNSPECIFIED RETINOPATHY SEVERITY (HCC): ICD-10-CM

## 2025-08-22 DIAGNOSIS — E78.2 MIXED HYPERLIPIDEMIA: ICD-10-CM

## 2025-08-22 DIAGNOSIS — E55.9 VITAMIN D DEFICIENCY: ICD-10-CM

## 2025-08-22 DIAGNOSIS — M85.80 OSTEOPENIA, UNSPECIFIED LOCATION: ICD-10-CM

## 2025-08-22 DIAGNOSIS — Z00.00 MEDICARE ANNUAL WELLNESS VISIT, SUBSEQUENT: Primary | ICD-10-CM

## 2025-08-22 PROCEDURE — 99214 OFFICE O/P EST MOD 30 MIN: CPT | Performed by: PHYSICIAN ASSISTANT

## 2025-08-22 PROCEDURE — G0439 PPPS, SUBSEQ VISIT: HCPCS | Performed by: PHYSICIAN ASSISTANT

## 2025-08-22 PROCEDURE — G2211 COMPLEX E/M VISIT ADD ON: HCPCS | Performed by: PHYSICIAN ASSISTANT

## (undated) DEVICE — 3M™ STERI-STRIP™ REINFORCED ADHESIVE SKIN CLOSURES, R1547, 1/2 IN X 4 IN (12 MM X 100 MM), 6 STRIPS/ENVELOPE: Brand: 3M™ STERI-STRIP™

## (undated) DEVICE — BETHLEHEM UNIVERSAL MINOR GEN: Brand: CARDINAL HEALTH

## (undated) DEVICE — LIGACLIP MCA MULTIPLE CLIP APPLIERS, 20 SMALL CLIPS: Brand: LIGACLIP

## (undated) DEVICE — SUT SILK 4-0 30 IN A303H

## (undated) DEVICE — NEEDLE 25G X 1 1/2

## (undated) DEVICE — DRAPE SHEET THREE QUARTER

## (undated) DEVICE — UNDYED MONOFILAMENT (POLYDIOXANONE), ABSORBABLE SYNTHETIC SURGICAL SUTURE: Brand: PDS

## (undated) DEVICE — ELECTRODE EZ CLEAN BLADE -0012

## (undated) DEVICE — GLOVE INDICATOR PI UNDERGLOVE SZ 8 BLUE

## (undated) DEVICE — DRAPE EQUIPMENT RF WAND

## (undated) DEVICE — TIBURON SPLIT SHEET: Brand: CONVERTORS

## (undated) DEVICE — POV-IOD SWAB STICKS

## (undated) DEVICE — TELFA NON-ADHERENT ABSORBENT DRESSING: Brand: TELFA

## (undated) DEVICE — GLOVE SRG BIOGEL 7.5

## (undated) DEVICE — ADHESIVE SKIN HIGH VISCOSITY EXOFIN 1ML

## (undated) DEVICE — INTENDED FOR TISSUE SEPARATION, AND OTHER PROCEDURES THAT REQUIRE A SHARP SURGICAL BLADE TO PUNCTURE OR CUT.: Brand: BARD-PARKER SAFETY BLADES SIZE 15, STERILE

## (undated) DEVICE — ULTRASOUND GEL STERILE FOIL PK

## (undated) DEVICE — PENCIL ELECTROSURG E-Z CLEAN -0035H